# Patient Record
Sex: FEMALE | Race: WHITE | NOT HISPANIC OR LATINO | Employment: FULL TIME | URBAN - NONMETROPOLITAN AREA
[De-identification: names, ages, dates, MRNs, and addresses within clinical notes are randomized per-mention and may not be internally consistent; named-entity substitution may affect disease eponyms.]

---

## 2017-03-04 ENCOUNTER — HOSPITAL ENCOUNTER (EMERGENCY)
Facility: HOSPITAL | Age: 27
Discharge: HOME | End: 2017-03-04
Admitting: EMERGENCY MEDICINE

## 2017-03-04 VITALS
HEIGHT: 65 IN | SYSTOLIC BLOOD PRESSURE: 126 MMHG | BODY MASS INDEX: 44.15 KG/M2 | WEIGHT: 265 LBS | OXYGEN SATURATION: 99 % | HEART RATE: 75 BPM | TEMPERATURE: 98 F | DIASTOLIC BLOOD PRESSURE: 82 MMHG | RESPIRATION RATE: 17 BRPM

## 2017-03-04 DIAGNOSIS — Z87.42 HISTORY OF PCOS: ICD-10-CM

## 2017-03-04 DIAGNOSIS — N92.1 MENORRHAGIA WITH IRREGULAR CYCLE: Primary | ICD-10-CM

## 2017-03-04 LAB
ABO GROUP BLD: NORMAL
ALBUMIN SERPL-MCNC: 4 G/DL (ref 3.5–5)
ALBUMIN/GLOB SERPL: 1.3 G/DL (ref 1.1–2.5)
ALP SERPL-CCNC: 60 U/L (ref 24–120)
ALT SERPL W P-5'-P-CCNC: 27 U/L (ref 0–54)
ANION GAP SERPL CALCULATED.3IONS-SCNC: 10 MMOL/L (ref 4–13)
APTT PPP: 28.1 SECONDS (ref 24.1–34.8)
AST SERPL-CCNC: 25 U/L (ref 7–45)
BASOPHILS # BLD AUTO: 0.02 10*3/MM3 (ref 0–0.2)
BASOPHILS NFR BLD AUTO: 0.2 % (ref 0–2)
BILIRUB SERPL-MCNC: 0.3 MG/DL (ref 0.1–1)
BLD GP AB SCN SERPL QL: NEGATIVE
BUN BLD-MCNC: 9 MG/DL (ref 5–21)
BUN/CREAT SERPL: 10.2 (ref 7–25)
CALCIUM SPEC-SCNC: 9.4 MG/DL (ref 8.4–10.4)
CHLORIDE SERPL-SCNC: 102 MMOL/L (ref 98–110)
CO2 SERPL-SCNC: 28 MMOL/L (ref 24–31)
CREAT BLD-MCNC: 0.88 MG/DL (ref 0.5–1.4)
DEPRECATED RDW RBC AUTO: 39.4 FL (ref 40–54)
EOSINOPHIL # BLD AUTO: 0.18 10*3/MM3 (ref 0–0.7)
EOSINOPHIL NFR BLD AUTO: 1.9 % (ref 0–4)
ERYTHROCYTE [DISTWIDTH] IN BLOOD BY AUTOMATED COUNT: 13.4 % (ref 12–15)
GFR SERPL CREATININE-BSD FRML MDRD: 78 ML/MIN/1.73
GLOBULIN UR ELPH-MCNC: 3.1 GM/DL
GLUCOSE BLD-MCNC: 92 MG/DL (ref 70–100)
HCG SERPL QL: NEGATIVE
HCT VFR BLD AUTO: 38.8 % (ref 37–47)
HGB BLD-MCNC: 13.9 G/DL (ref 12–16)
IMM GRANULOCYTES # BLD: 0.03 10*3/MM3 (ref 0–0.03)
IMM GRANULOCYTES NFR BLD: 0.3 % (ref 0–5)
INR PPP: 0.9 (ref 0.91–1.09)
LYMPHOCYTES # BLD AUTO: 3.61 10*3/MM3 (ref 0.72–4.86)
LYMPHOCYTES NFR BLD AUTO: 38.3 % (ref 15–45)
MCH RBC QN AUTO: 29.1 PG (ref 28–32)
MCHC RBC AUTO-ENTMCNC: 35.8 G/DL (ref 33–36)
MCV RBC AUTO: 81.3 FL (ref 82–98)
MONOCYTES # BLD AUTO: 0.54 10*3/MM3 (ref 0.19–1.3)
MONOCYTES NFR BLD AUTO: 5.7 % (ref 4–12)
NEUTROPHILS # BLD AUTO: 5.04 10*3/MM3 (ref 1.87–8.4)
NEUTROPHILS NFR BLD AUTO: 53.6 % (ref 39–78)
PLATELET # BLD AUTO: 271 10*3/MM3 (ref 130–400)
PMV BLD AUTO: 9.9 FL (ref 6–12)
POTASSIUM BLD-SCNC: 3.7 MMOL/L (ref 3.5–5.3)
PROT SERPL-MCNC: 7.1 G/DL (ref 6.3–8.7)
PROTHROMBIN TIME: 12.4 SECONDS (ref 11.9–14.6)
RBC # BLD AUTO: 4.77 10*6/MM3 (ref 4.2–5.4)
RH BLD: POSITIVE
SODIUM BLD-SCNC: 140 MMOL/L (ref 135–145)
WBC NRBC COR # BLD: 9.42 10*3/MM3 (ref 4.8–10.8)

## 2017-03-04 PROCEDURE — 84703 CHORIONIC GONADOTROPIN ASSAY: CPT | Performed by: NURSE PRACTITIONER

## 2017-03-04 PROCEDURE — 85025 COMPLETE CBC W/AUTO DIFF WBC: CPT | Performed by: NURSE PRACTITIONER

## 2017-03-04 PROCEDURE — 99283 EMERGENCY DEPT VISIT LOW MDM: CPT

## 2017-03-04 PROCEDURE — 86900 BLOOD TYPING SEROLOGIC ABO: CPT | Performed by: NURSE PRACTITIONER

## 2017-03-04 PROCEDURE — 85730 THROMBOPLASTIN TIME PARTIAL: CPT | Performed by: NURSE PRACTITIONER

## 2017-03-04 PROCEDURE — 86850 RBC ANTIBODY SCREEN: CPT | Performed by: NURSE PRACTITIONER

## 2017-03-04 PROCEDURE — 96361 HYDRATE IV INFUSION ADD-ON: CPT

## 2017-03-04 PROCEDURE — 85610 PROTHROMBIN TIME: CPT | Performed by: NURSE PRACTITIONER

## 2017-03-04 PROCEDURE — 96360 HYDRATION IV INFUSION INIT: CPT

## 2017-03-04 PROCEDURE — 86901 BLOOD TYPING SEROLOGIC RH(D): CPT | Performed by: NURSE PRACTITIONER

## 2017-03-04 PROCEDURE — 80053 COMPREHEN METABOLIC PANEL: CPT | Performed by: NURSE PRACTITIONER

## 2017-03-04 RX ORDER — MEDROXYPROGESTERONE ACETATE 5 MG/1
5 TABLET ORAL DAILY
Qty: 5 TABLET | Refills: 0 | Status: SHIPPED | OUTPATIENT
Start: 2017-03-04 | End: 2017-03-06 | Stop reason: ALTCHOICE

## 2017-03-04 RX ORDER — HYDROCODONE BITARTRATE AND ACETAMINOPHEN 5; 325 MG/1; MG/1
1 TABLET ORAL EVERY 6 HOURS PRN
Qty: 10 TABLET | Refills: 0 | Status: SHIPPED | OUTPATIENT
Start: 2017-03-04 | End: 2017-03-20

## 2017-03-04 RX ADMIN — SODIUM CHLORIDE 500 ML: 0.9 INJECTION, SOLUTION INTRAVENOUS at 19:16

## 2017-03-05 NOTE — ED PROVIDER NOTES
Subjective   Patient is a 26 y.o. female presenting with vaginal bleeding.   Vaginal Bleeding   Quality:  Bright red  Severity:  Moderate  Onset quality:  Gradual  Duration:  2 weeks  Timing:  Intermittent  Progression:  Worsening  Chronicity:  New  Menstrual history:  Irregular (hx of PCOS- reports heavy menstural bleeding this month)  Possible pregnancy: no    Context: at rest    Context: not after intercourse, not after urination, not during intercourse, not during urination, not foreign body and not genital trauma    Relieved by:  Nothing  Worsened by:  Nothing  Ineffective treatments:  None tried  Associated symptoms: abdominal pain    Associated symptoms: no back pain, no dizziness, no dyspareunia, no dysuria, no fatigue, no fever and no vaginal discharge    Associated symptoms comment:  Reports typical abdominal cramping associated with patient's menstrual cycle  Risk factors: no bleeding disorder, no hx of ectopic pregnancy, no hx of endometriosis, no gynecological surgery, no new sexual partner, no ovarian cysts, no ovarian torsion, no PID, no STD and no STD exposure        Review of Systems   Constitutional: Negative for appetite change, chills, fatigue and fever.   HENT: Negative for congestion and sore throat.    Respiratory: Negative for chest tightness and shortness of breath.    Cardiovascular: Negative for palpitations.   Gastrointestinal: Positive for abdominal pain. Negative for abdominal distention and vomiting.   Genitourinary: Positive for menstrual problem and vaginal bleeding. Negative for difficulty urinating, dyspareunia, dysuria and vaginal discharge.   Musculoskeletal: Negative for back pain, joint swelling, neck pain and neck stiffness.   Skin: Negative for rash.   Neurological: Negative for dizziness and headaches.   All other systems reviewed and are negative.      Past Medical History   Diagnosis Date   • Contraception    • Dyspareunia    • Hypertension    • Insulin resistance    • IUD  check up    • Ovarian cyst    • PCOS (polycystic ovarian syndrome)    • Trigeminal neuralgia    • Vitamin D deficiency        Allergies   Allergen Reactions   • Ibuprofen        Past Surgical History   Procedure Laterality Date   • Tonsillectomy and adenoidectomy     • [place] tooth extraction         Family History   Problem Relation Age of Onset   • No Known Problems Mother    • No Known Problems Father    • No Known Problems Maternal Grandmother    • No Known Problems Maternal Grandfather    • Leukemia Paternal Grandmother    • No Known Problems Paternal Grandfather        Social History     Social History   • Marital status:      Spouse name: N/A   • Number of children: N/A   • Years of education: N/A     Social History Main Topics   • Smoking status: Never Smoker   • Smokeless tobacco: None   • Alcohol use Yes      Comment: occasional   • Drug use: No   • Sexual activity: Yes     Partners: Male     Birth control/ protection: None     Other Topics Concern   • None     Social History Narrative   • None       Prior to Admission medications    Medication Sig Start Date End Date Taking? Authorizing Provider   amitriptyline (ELAVIL) 25 MG tablet Take 25 mg by mouth. 9/26/16  Yes Historical Provider, MD   gabapentin (NEURONTIN) 600 MG tablet Take 600 mg by mouth 3 (three) times a day. 9/26/16  Yes Historical Provider, MD   metFORMIN (GLUCOPHAGE) 500 MG tablet Take 1 tablet by mouth 2 (Two) Times a Day With Meals. 11/23/16 11/23/17 Yes ZITA Her   OXcarbazepine (TRILEPTAL) 300 MG tablet Take 300 mg by mouth.   Yes Historical Provider, MD   HYDROcodone-acetaminophen (NORCO) 5-325 MG per tablet Take 1 tablet by mouth Every 6 (Six) Hours As Needed for moderate pain (4-6). 3/4/17   ZITA Gudino   medroxyPROGESTERone (PROVERA) 5 MG tablet Take 1 tablet by mouth Daily for 5 days. 3/4/17 3/9/17  ZITA Gudino       Medications   sodium chloride 0.9 % bolus 500 mL (0 mL Intravenous  "Stopped 3/4/17 2111)       Visit Vitals   • /82 (BP Location: Right arm, Patient Position: Sitting)   • Pulse 75   • Temp 98 °F (36.7 °C) (Oral)   • Resp 17   • Ht 65\" (165.1 cm)   • Wt 265 lb (120 kg)   • SpO2 99%   • BMI 44.1 kg/m2         Objective   Physical Exam   Constitutional: She is oriented to person, place, and time. She appears well-developed and well-nourished.   HENT:   Head: Normocephalic and atraumatic.   Right Ear: External ear normal.   Left Ear: External ear normal.   Nose: Nose normal.   Mouth/Throat: Oropharynx is clear and moist.   Eyes: Conjunctivae and EOM are normal. Pupils are equal, round, and reactive to light.   Neck: Normal range of motion. Neck supple.   Cardiovascular: Normal rate, regular rhythm, normal heart sounds and intact distal pulses.    Pulmonary/Chest: Effort normal and breath sounds normal.   Abdominal: Soft. Bowel sounds are normal.   Musculoskeletal: Normal range of motion.   Neurological: She is alert and oriented to person, place, and time.   Skin: Skin is warm and dry.   Psychiatric: She has a normal mood and affect. Her behavior is normal. Judgment and thought content normal.   Nursing note and vitals reviewed.      Procedures         Lab Results (last 24 hours)     Procedure Component Value Units Date/Time    CBC & Differential [[id number]] Collected:  03/04/17 1905    Specimen:  Blood Updated:  03/04/17 1939    Narrative:       The following orders were created for panel order CBC & Differential.  Procedure                               Abnormality         Status                     ---------                               -----------         ------                     CBC Auto Differential[[id number]]         Abnormal            Final result                 Please view results for these tests on the individual orders.    Comprehensive Metabolic Panel [[id number]] Collected:  03/04/17 1905    Specimen:  Blood Updated:  03/04/17 1950     Glucose 92 mg/dL      BUN 9 " mg/dL      Creatinine 0.88 mg/dL      Sodium 140 mmol/L      Potassium 3.7 mmol/L      Chloride 102 mmol/L      CO2 28.0 mmol/L      Calcium 9.4 mg/dL      Total Protein 7.1 g/dL      Albumin 4.00 g/dL      ALT (SGPT) 27 U/L      AST (SGOT) 25 U/L      Alkaline Phosphatase 60 U/L      Total Bilirubin 0.3 mg/dL      eGFR Non African Amer 78 mL/min/1.73      Globulin 3.1 gm/dL      A/G Ratio 1.3 g/dL      BUN/Creatinine Ratio 10.2      Anion Gap 10.0 mmol/L     aPTT [[id number]]  (Normal) Collected:  03/04/17 1905    Specimen:  Blood Updated:  03/04/17 1949     PTT 28.1 seconds     Protime-INR [[id number]]  (Abnormal) Collected:  03/04/17 1905    Specimen:  Blood Updated:  03/04/17 1949     Protime 12.4 Seconds      INR 0.90 (L)     hCG, Serum, Qualitative [[id number]]  (Normal) Collected:  03/04/17 1905    Specimen:  Blood Updated:  03/04/17 2009     HCG Qualitative Negative     CBC Auto Differential [[id number]]  (Abnormal) Collected:  03/04/17 1905    Specimen:  Blood Updated:  03/04/17 1939     WBC 9.42 10*3/mm3      RBC 4.77 10*6/mm3      Hemoglobin 13.9 g/dL      Hematocrit 38.8 %      MCV 81.3 (L) fL      MCH 29.1 pg      MCHC 35.8 g/dL      RDW 13.4 %      RDW-SD 39.4 (L) fl      MPV 9.9 fL      Platelets 271 10*3/mm3      Neutrophil % 53.6 %      Lymphocyte % 38.3 %      Monocyte % 5.7 %      Eosinophil % 1.9 %      Basophil % 0.2 %      Immature Grans % 0.3 %      Neutrophils, Absolute 5.04 10*3/mm3      Lymphocytes, Absolute 3.61 10*3/mm3      Monocytes, Absolute 0.54 10*3/mm3      Eosinophils, Absolute 0.18 10*3/mm3      Basophils, Absolute 0.02 10*3/mm3      Immature Grans, Absolute 0.03 10*3/mm3           No orders to display         ED Course  ED Course          MDM  Number of Diagnoses or Management Options  History of PCOS: new and requires workup  Menorrhagia with irregular cycle: new and requires workup     Amount and/or Complexity of Data Reviewed  Clinical lab tests: ordered and reviewed  Discuss the  patient with other providers: yes    Risk of Complications, Morbidity, and/or Mortality  Presenting problems: low  Diagnostic procedures: low  Management options: low    Patient Progress  Patient progress: improved      Final diagnoses:   Menorrhagia with irregular cycle   History of PCOS          Rosemarie Tesfaye, ZITA  03/04/17 6850

## 2017-03-06 RX ORDER — MEDROXYPROGESTERONE ACETATE 10 MG/1
10 TABLET ORAL DAILY
Qty: 10 TABLET | Refills: 1 | Status: SHIPPED | OUTPATIENT
Start: 2017-03-06 | End: 2017-05-08

## 2017-03-06 NOTE — PROGRESS NOTES
Jaquelin was in the ER over weekend as she has had bleeding for greater than ten days and it became quite heavy with clots.  She has PCOS and is on Metformin.  Her  was in November.  Her PG. Test was negative  And her HGB was 13.9. She states she was given Provera 5 mgm 1 daily for 5 days.  I spoke with her today and she has not filled it, she is at work  And her bleeding has  Cut down significantly.  She has an appointment on the 28th of March.  I will increase the Provera to 10 mgm  1 daily for 10 days. She will call in the meantime if her bleeding becomes heavy again or prolonged.

## 2017-03-16 ENCOUNTER — TELEPHONE (OUTPATIENT)
Dept: OBSTETRICS AND GYNECOLOGY | Facility: CLINIC | Age: 27
End: 2017-03-16

## 2017-03-16 NOTE — TELEPHONE ENCOUNTER
Patient calls with questions regarding her provera.  Would like for you to call her at her convenience.

## 2017-03-19 ENCOUNTER — DOCUMENTATION (OUTPATIENT)
Dept: OBSTETRICS AND GYNECOLOGY | Facility: CLINIC | Age: 27
End: 2017-03-19

## 2017-03-19 PROBLEM — N93.8 DUB (DYSFUNCTIONAL UTERINE BLEEDING): Status: ACTIVE | Noted: 2017-03-19

## 2017-03-19 NOTE — PROGRESS NOTES
Jaquelin has been using Provera to stop DUB.  She called last week and left a message that she had a question about Provera.  I viewed the message and given the limited information being  that of a medication question, I continued seeing patient's  with plans of calling her before the day was over.  I was not aware that she was having bleeding issues even while on the Provera.  I  inadvertently failed to call her back re: her question about Provera.  During my communication with Jaquelin today, she tells me that  the Provera did not stop her vaginal bleeding.  She previously had been told, just prior to starting the Provera that she would have a followup appointment. Her followup appointment is 3/28/2107  I communicated with Jaquelin today and I will see her in the am,there is no extremely  heavy bleeding now but she continues to have vaginal bleeding.  I apologized for my oversight.  Jaquelin seemed happy with this and said we were the best.

## 2017-03-20 ENCOUNTER — OFFICE VISIT (OUTPATIENT)
Dept: OBSTETRICS AND GYNECOLOGY | Facility: CLINIC | Age: 27
End: 2017-03-20

## 2017-03-20 ENCOUNTER — PROCEDURE VISIT (OUTPATIENT)
Dept: OBSTETRICS AND GYNECOLOGY | Facility: CLINIC | Age: 27
End: 2017-03-20

## 2017-03-20 VITALS
SYSTOLIC BLOOD PRESSURE: 140 MMHG | BODY MASS INDEX: 43.82 KG/M2 | DIASTOLIC BLOOD PRESSURE: 85 MMHG | HEIGHT: 65 IN | WEIGHT: 263 LBS

## 2017-03-20 DIAGNOSIS — E88.81 INSULIN RESISTANCE: ICD-10-CM

## 2017-03-20 DIAGNOSIS — N92.1 MENORRHAGIA WITH IRREGULAR CYCLE: Primary | ICD-10-CM

## 2017-03-20 DIAGNOSIS — E28.2 PCOS (POLYCYSTIC OVARIAN SYNDROME): ICD-10-CM

## 2017-03-20 DIAGNOSIS — R79.89 ELEVATED PROLACTIN LEVEL: ICD-10-CM

## 2017-03-20 LAB
B-HCG UR QL: NEGATIVE
INTERNAL NEGATIVE CONTROL: NORMAL
INTERNAL POSITIVE CONTROL: NORMAL
Lab: NORMAL

## 2017-03-20 PROCEDURE — 76830 TRANSVAGINAL US NON-OB: CPT | Performed by: OBSTETRICS & GYNECOLOGY

## 2017-03-20 PROCEDURE — 99213 OFFICE O/P EST LOW 20 MIN: CPT | Performed by: NURSE PRACTITIONER

## 2017-03-20 PROCEDURE — 81025 URINE PREGNANCY TEST: CPT | Performed by: NURSE PRACTITIONER

## 2017-03-20 RX ORDER — HYDROCODONE BITARTRATE AND ACETAMINOPHEN 10; 325 MG/1; MG/1
TABLET ORAL
Refills: 0 | COMMUNITY
Start: 2016-12-20 | End: 2017-05-08

## 2017-03-20 NOTE — PROGRESS NOTES
Subjective     Jaquelin Rivera is a 26 y.o. No obstetric history on file.     Chief Complaint   Patient presents with   • Menorrhagia     Pt is here today for c/o having heavy bleeding that has been going on for about a month. Pt states that she went to ER for this and is now here.  Pt states that its heavy bleeding with passing alot clots as well as coming close to passing out            HPI  Patient has been off BCP since Oct. She was given  Provera to have a period in November, she had a follow up period 11/26 to Dec. 2. The she start bleeding on Feb. 16th, she continued to bleed and I gave her Provera 10 days and she did not quit bleeding and is still bleeding, the bleeding is heavy, the Provera made no difference  and she  has  been off of the Provera.  X 4 days. She has mild cramping.      The following portions of the patient's history were reviewed and updated as appropriate:vital signs, allergies, current medications, past family history, past medical history, past social history, past surgical history and problem list.    Review of Systems   Constitutional: Negative for activity change and diaphoresis.   HENT: Negative for congestion, ear discharge and nosebleeds.    Eyes: Negative for discharge and itching.   Respiratory: Negative for apnea and choking.    Gastrointestinal: Negative for abdominal distention and anal bleeding.   Endocrine: Negative for cold intolerance and polydipsia.   Genitourinary: Negative for dyspareunia, flank pain, hematuria and urgency.   Musculoskeletal: Negative for arthralgias, gait problem and myalgias.   Skin:        Hirsutism    chin   Neurological: Negative for tremors and headaches. Seizures: menorrhagia.        Trigeminal  neuralgia   Psychiatric/Behavioral: Negative for agitation, confusion and dysphoric mood. The patient is not hyperactive.                   Visit Vitals   • BP    #2 BP  124/76  Pulse  64  Resp. normal 140/85 (BP Location: Right arm, Patient  "Position: Sitting, Cuff Size: Adult)   • Ht 65\" (165.1 cm)   • Wt 263 lb (119 kg)   • Breastfeeding No   • BMI 43.77 kg/m2         Physical Exam   Constitutional: She appears well-developed.   27 yo obese female in no acute distress.  Accompanied by her .   Abdominal: Soft. Normal appearance, normal aorta and bowel sounds are normal.   Genitourinary: Rectal exam shows no mass. Pelvic exam was performed with patient supine. There is no rash or tenderness on the right labia. There is no tenderness on the left labia. Uterus is not deviated, not enlarged, not fixed and not tender. Cervix exhibits discharge (vaginal bleeding noted, active  bleeding noted off and on over 2 minutes of observation, the bleeding was not excessive). Cervix exhibits no motion tenderness and no friability. Right adnexum displays no mass and no tenderness. Left adnexum displays no mass and no tenderness. No erythema, tenderness or bleeding in the vagina. No foreign body in the vagina. No signs of injury around the vagina. No vaginal discharge found.   Vitals reviewed.       Assessment/Plan     ASSESSMENT  1. Menorrhagia with irregular cycle        PLAN    Problem List Items Addressed This Visit     None      Visit Diagnoses     Menorrhagia with irregular cycle    -  Primary    Relevant Orders    CBC Auto Differential    Prolactin    POC Pregnancy, Urine  negative                PCOS (polycystic ovarian syndrome)    Insulin resistance    Elevated prolactin level  Minimally she has no galactorrhea    US shows  9 mm endometrium with developing follicle on each with multiple  Small follicles  Noted bilaterally  There is a clot vs polyp near the cervix, likely is a clot she is getting ready to pass.    Patient is to start Aygestin Taper, counseled to report if there is increase in bleeding or it does not subside. She is counseled that she will had a withdrawal period following the Aygestin and then we will repeat her US.  "                   Jeannie Latif, APRN  3/20/2017

## 2017-03-22 LAB
BASOPHILS # BLD AUTO: 0.02 10*3/MM3 (ref 0–0.2)
BASOPHILS NFR BLD AUTO: 0.2 % (ref 0–2)
EOSINOPHIL # BLD AUTO: 0.18 10*3/MM3 (ref 0–0.7)
EOSINOPHIL NFR BLD AUTO: 2.1 % (ref 0–4)
ERYTHROCYTE [DISTWIDTH] IN BLOOD BY AUTOMATED COUNT: 14.3 % (ref 12–15)
HCT VFR BLD AUTO: 38.3 % (ref 37–47)
HGB BLD-MCNC: 12.6 G/DL (ref 12–16)
IMM GRANULOCYTES # BLD: 0.04 10*3/MM3 (ref 0–0.03)
IMM GRANULOCYTES NFR BLD: 0.5 % (ref 0–5)
INSULIN SERPL-ACNC: 39.6 UIU/ML (ref 2.6–24.9)
LYMPHOCYTES # BLD AUTO: 2.78 10*3/MM3 (ref 0.72–4.86)
LYMPHOCYTES NFR BLD AUTO: 32.2 % (ref 15–45)
MCH RBC QN AUTO: 28.1 PG (ref 28–32)
MCHC RBC AUTO-ENTMCNC: 32.9 G/DL (ref 33–36)
MCV RBC AUTO: 85.5 FL (ref 82–98)
MONOCYTES # BLD AUTO: 0.59 10*3/MM3 (ref 0.19–1.3)
MONOCYTES NFR BLD AUTO: 6.8 % (ref 4–12)
NEUTROPHILS # BLD AUTO: 5.03 10*3/MM3 (ref 1.87–8.4)
NEUTROPHILS NFR BLD AUTO: 58.2 % (ref 39–78)
PLATELET # BLD AUTO: 307 10*3/MM3 (ref 130–400)
PROLACTIN SERPL-MCNC: 31.1 NG/ML (ref 4.8–23.3)
RBC # BLD AUTO: 4.48 10*6/MM3 (ref 4.2–5.4)
WBC # BLD AUTO: 8.64 10*3/MM3 (ref 4.8–10.8)

## 2017-04-03 ENCOUNTER — PROCEDURE VISIT (OUTPATIENT)
Dept: OBSTETRICS AND GYNECOLOGY | Facility: CLINIC | Age: 27
End: 2017-04-03

## 2017-04-03 ENCOUNTER — OFFICE VISIT (OUTPATIENT)
Dept: OBSTETRICS AND GYNECOLOGY | Facility: CLINIC | Age: 27
End: 2017-04-03

## 2017-04-03 VITALS
BODY MASS INDEX: 43.82 KG/M2 | WEIGHT: 263 LBS | DIASTOLIC BLOOD PRESSURE: 80 MMHG | SYSTOLIC BLOOD PRESSURE: 140 MMHG | HEIGHT: 65 IN

## 2017-04-03 DIAGNOSIS — E28.2 PCOS (POLYCYSTIC OVARIAN SYNDROME): Primary | ICD-10-CM

## 2017-04-03 DIAGNOSIS — N92.1 MENORRHAGIA WITH IRREGULAR CYCLE: Primary | ICD-10-CM

## 2017-04-03 LAB
CHOLEST SERPL-MCNC: 191 MG/DL (ref 130–200)
HBA1C MFR BLD: 4.1 %
HDLC SERPL-MCNC: 46 MG/DL
LDLC SERPL CALC-MCNC: 130 MG/DL (ref 0–99)
LDLC/HDLC SERPL: 2.82 {RATIO}
TRIGL SERPL-MCNC: 76 MG/DL (ref 0–149)
VLDLC SERPL CALC-MCNC: 15.2 MG/DL

## 2017-04-03 PROCEDURE — 99213 OFFICE O/P EST LOW 20 MIN: CPT | Performed by: NURSE PRACTITIONER

## 2017-04-03 PROCEDURE — 76830 TRANSVAGINAL US NON-OB: CPT | Performed by: OBSTETRICS & GYNECOLOGY

## 2017-04-03 RX ORDER — LEVOCETIRIZINE DIHYDROCHLORIDE 5 MG/1
TABLET, FILM COATED ORAL
Refills: 0 | COMMUNITY
Start: 2017-02-12 | End: 2017-05-08

## 2017-04-04 NOTE — PROGRESS NOTES
Jaquelin Rivera is a 26 y.o. No obstetric history on file.     Chief Complaint   Patient presents with   • Vaginal Bleeding     Pt is here today for f/u on results of U/S she had done in the office.  Pt states that she stopped  bleeding for a few days but has started again last night with some bleeding           HPI -  Jaquelin is in today for followup for abnormal bleeding for wich she did not stop with Provera but she did Aygestin.  She stopped Aygestin 4 perry ago and is now spotting.  She has PCOS . She has regular menses only when on  BCP and is off of them and not using any contraception.  She is taking Metformin (regular release) 500 BID and she has some  nausea with this. She has been on this  her insurance would not cover the long acting.        The following portions of the patient's history were reviewed and updated as appropriate:vital signs, allergies, current medications, past family history, past medical history, past social history, past surgical history and problem list.    Review of Systems   Constitutional: Negative for activity change, chills and diaphoresis.   HENT: Negative for congestion, dental problem, ear pain, nosebleeds, postnasal drip and sneezing.    Eyes: Negative for pain, redness and itching.   Respiratory: Negative for apnea, cough and chest tightness.    Cardiovascular: Negative for chest pain and leg swelling.   Gastrointestinal: Negative for abdominal distention, anal bleeding and constipation.   Endocrine: Negative for cold intolerance, heat intolerance and polyphagia.   Genitourinary: Negative for difficulty urinating, dysuria, flank pain and urgency.   Musculoskeletal: Positive for myalgias. Negative for arthralgias, gait problem and joint swelling.   Skin: Negative for pallor and rash.   Allergic/Immunologic: Negative for environmental allergies and food allergies.   Neurological: Negative for seizures, syncope and headaches.        PATIENT HAS TRIGIMINAL  NEURALGIA  "    Breast ROS: PATIENT HAS NO GALACTORRHEA    Objective      /80 (BP Location: Right arm, Patient Position: Sitting, Cuff Size: Adult)  Ht 65\" (165.1 cm)  Wt 263 lb (119 kg)  BMI 43.77 kg/m2      Physical Exam   Constitutional: She is oriented to person, place, and time. She appears well-developed and well-nourished. No distress.   HENT:   Head: Normocephalic and atraumatic.   Eyes: Right eye exhibits no discharge. Left eye exhibits no discharge.   Neck: Normal range of motion. Neck supple. No thyromegaly present.   Pulmonary/Chest: Effort normal.   Abdominal: Soft. She exhibits no distension. There is no tenderness.   Musculoskeletal: She exhibits no edema or deformity.   Neurological: She is alert and oriented to person, place, and time.   Patient has trigeminal neuralgia  She no longer is taking Trileptal or Gabapentin   Skin: She is not diaphoretic.   Nursing note and vitals reviewed.       Assessment/Plan     ASSESSMENT  1. PCOS (polycystic ovarian syndrome)  US today again c/w PCOS, LH 3x FSH  Elevated Insulin on Metformin 500 BID  (has nausea) insurance would not pay for long acting  HGB  A!C  Normal today   LDL elevated, HDL's low, cholesterol less than 200.  History of minimal elevated Prolactin  Most recent 31 3/21/17  History of AUB, prolonged heavy menses stopped by Aygestin     PATIENT IS NOT USING BIRTH CONTROL. SHE WOULD LIKE TO GET PREGNANT  PLAN    Problem List Items Addressed This Visit     None      Visit Diagnoses     PCOS (polycystic ovarian syndrome)    -  Primary    Relevant Orders    Hemoglobin A1c (Completed)    Lipid Panel With LDL / HDL Ratio (Completed)          Orders Placed This Encounter   Procedures   • Hemoglobin A1c   • Lipid Panel With LDL / HDL Ratio       I will review POC with physician and advise the patient.    Jeannie Latif, APRN  4/4/2017           "

## 2017-04-17 ENCOUNTER — DOCUMENTATION (OUTPATIENT)
Dept: OBSTETRICS AND GYNECOLOGY | Facility: CLINIC | Age: 27
End: 2017-04-17

## 2017-04-17 NOTE — PROGRESS NOTES
Patient is not tolerating the Metformin 500 bid, she has GI  upset even though she has taken it for months.  Five months ago her Insulin level on the Metformin 500 bid was 19.9.  Her recent Insulin level is elevated at 39.6 while on the same dose.  Jaquelin has had a mildly elevated prolactin level on 3 occassions as can be associated with  PCOS.  She has no galactorrhea.  Jaquelin has not been taking Trileptal for several months.  She previously took this for trigeminal neuralgia.    Within the past 4 weeks, Jaquelin had very heavy and prolonged heavy bleeding that did not respond to Provera but did with Aygestin.  Her US after Aygestin withdrawal shows ovaries C/W PCOS.   She and her  want to proceed with attempting pregnancy and understands that she will need   Clomid.    I will send in a P.A. For Metformin  2 po daily with food. (Felicia).  She has a followup appointment with Dr. Osorio.

## 2017-04-24 ENCOUNTER — OFFICE VISIT (OUTPATIENT)
Dept: OBSTETRICS AND GYNECOLOGY | Facility: CLINIC | Age: 27
End: 2017-04-24

## 2017-04-24 VITALS
SYSTOLIC BLOOD PRESSURE: 138 MMHG | WEIGHT: 268 LBS | DIASTOLIC BLOOD PRESSURE: 88 MMHG | BODY MASS INDEX: 44.65 KG/M2 | HEIGHT: 65 IN

## 2017-04-24 DIAGNOSIS — N97.0 ANOVULATORY CYCLE: Primary | ICD-10-CM

## 2017-04-24 DIAGNOSIS — Z78.9 NON-SMOKER: ICD-10-CM

## 2017-04-24 DIAGNOSIS — Z31.69 ENCOUNTER FOR PRECONCEPTION CONSULTATION: ICD-10-CM

## 2017-04-24 PROBLEM — N93.8 DUB (DYSFUNCTIONAL UTERINE BLEEDING): Status: RESOLVED | Noted: 2017-03-19 | Resolved: 2017-04-24

## 2017-04-24 PROCEDURE — 99213 OFFICE O/P EST LOW 20 MIN: CPT | Performed by: OBSTETRICS & GYNECOLOGY

## 2017-04-24 NOTE — PROGRESS NOTES
Subjective   Jaquelin Rivera is a 26 y.o. female is here today for follow-up.  Here to discuss fertility.  Menarche 13.  Long hx chronic anovulation and heavy when it occurred.  Took OCPs for several years with correction (started at age of 14).  Off OCPs for 9-12 months.  Now with typical chronic anovulatory cycles.  Trying to conceive since Dec 2016.  Partner with no kids.  On no meds other than weekly allergy shot.  Hx of malignancy with lower back radiation (partner).  Patient started on metformin last year.    Infertility   This is a chronic problem. The current episode started more than 1 month ago. The problem occurs intermittently. The problem has been unchanged. Pertinent negatives include no abdominal pain, anorexia, arthralgias, change in bowel habit, chest pain, chills, congestion, coughing, diaphoresis, fatigue, fever, headaches, joint swelling, myalgias, nausea, neck pain, numbness, rash, sore throat, swollen glands, urinary symptoms, vertigo, visual change, vomiting or weakness. Nothing aggravates the symptoms. She has tried nothing for the symptoms. The treatment provided no relief.       The following portions of the patient's history were reviewed and updated as appropriate: allergies, current medications, past family history, past medical history, past social history, past surgical history and problem list.    Review of Systems   Constitutional: Negative for chills, diaphoresis, fatigue and fever.   HENT: Negative for congestion and sore throat.    Eyes: Negative for photophobia and visual disturbance.   Respiratory: Negative for cough, choking and chest tightness.    Cardiovascular: Negative for chest pain and leg swelling.   Gastrointestinal: Negative for abdominal pain, anorexia, change in bowel habit, nausea and vomiting.   Endocrine: Negative for cold intolerance and heat intolerance.   Genitourinary: Positive for menstrual problem (irreg and heavy). Negative for dyspareunia, pelvic pain,  vaginal bleeding and vaginal discharge.   Musculoskeletal: Negative for arthralgias, joint swelling, myalgias and neck pain.   Skin: Negative for rash.   Neurological: Negative for vertigo, weakness, numbness and headaches.   Psychiatric/Behavioral: Negative for confusion, decreased concentration and dysphoric mood.       Objective   Physical Exam   Constitutional: She is oriented to person, place, and time. She appears well-developed and well-nourished.   HENT:   Head: Normocephalic and atraumatic.   Neck: Normal range of motion. Neck supple. No tracheal deviation present. No thyromegaly present.   Cardiovascular: Normal rate and regular rhythm.    Pulmonary/Chest: Effort normal and breath sounds normal.   Abdominal: Soft. Bowel sounds are normal. She exhibits no distension. There is no tenderness.   Musculoskeletal: Normal range of motion.   Neurological: She is alert and oriented to person, place, and time.   Skin: Skin is warm and dry. No rash noted. No erythema. No pallor.   Psychiatric: She has a normal mood and affect. Her behavior is normal. Judgment and thought content normal.   Nursing note and vitals reviewed.        Assessment/Plan   Jaquelin was seen today for infertility.    Diagnoses and all orders for this visit:    Anovulatory cycle    Encounter for preconception consultation    Non-smoker      Needs SA before final recommendations.  Labs reviewed and discussed with patient.  Will follow up after SA performed.    Soham Osorio MD

## 2017-05-08 ENCOUNTER — OFFICE VISIT (OUTPATIENT)
Dept: OBSTETRICS AND GYNECOLOGY | Facility: CLINIC | Age: 27
End: 2017-05-08

## 2017-05-08 VITALS
HEIGHT: 65 IN | WEIGHT: 267 LBS | DIASTOLIC BLOOD PRESSURE: 100 MMHG | BODY MASS INDEX: 44.48 KG/M2 | SYSTOLIC BLOOD PRESSURE: 160 MMHG

## 2017-05-08 DIAGNOSIS — E28.2 PCOS (POLYCYSTIC OVARIAN SYNDROME): Primary | ICD-10-CM

## 2017-05-08 DIAGNOSIS — Z31.69 ENCOUNTER FOR PRECONCEPTION CONSULTATION: ICD-10-CM

## 2017-05-08 DIAGNOSIS — Z78.9 NON-SMOKER: ICD-10-CM

## 2017-05-08 PROCEDURE — 99214 OFFICE O/P EST MOD 30 MIN: CPT | Performed by: OBSTETRICS & GYNECOLOGY

## 2017-05-08 RX ORDER — MEDROXYPROGESTERONE ACETATE 10 MG/1
10 TABLET ORAL DAILY
Qty: 30 TABLET | Refills: 1 | Status: SHIPPED | OUTPATIENT
Start: 2017-05-08 | End: 2017-05-31

## 2017-05-09 DIAGNOSIS — O20.0 THREATENED ABORTION: Primary | ICD-10-CM

## 2017-05-09 DIAGNOSIS — N91.2 AMENORRHEA: Primary | ICD-10-CM

## 2017-05-09 LAB — HCG INTACT+B SERPL-ACNC: ABNORMAL MIU/ML (ref 0–10)

## 2017-05-11 ENCOUNTER — RESULTS ENCOUNTER (OUTPATIENT)
Dept: OBSTETRICS AND GYNECOLOGY | Facility: CLINIC | Age: 27
End: 2017-05-11

## 2017-05-11 DIAGNOSIS — N91.2 AMENORRHEA: ICD-10-CM

## 2017-05-11 LAB — HCG INTACT+B SERPL-ACNC: ABNORMAL MIU/ML (ref 0–10)

## 2017-05-31 ENCOUNTER — PROCEDURE VISIT (OUTPATIENT)
Dept: OBSTETRICS AND GYNECOLOGY | Facility: CLINIC | Age: 27
End: 2017-05-31

## 2017-05-31 ENCOUNTER — INITIAL PRENATAL (OUTPATIENT)
Dept: OBSTETRICS AND GYNECOLOGY | Facility: CLINIC | Age: 27
End: 2017-05-31

## 2017-05-31 VITALS — DIASTOLIC BLOOD PRESSURE: 80 MMHG | BODY MASS INDEX: 43.93 KG/M2 | WEIGHT: 264 LBS | SYSTOLIC BLOOD PRESSURE: 120 MMHG

## 2017-05-31 DIAGNOSIS — Z34.91 PRENATAL CARE, FIRST TRIMESTER: Primary | ICD-10-CM

## 2017-05-31 DIAGNOSIS — Z78.9 NON-SMOKER: ICD-10-CM

## 2017-05-31 DIAGNOSIS — O36.80X0 ENCOUNTER TO DETERMINE FETAL VIABILITY OF PREGNANCY, NOT APPLICABLE OR UNSPECIFIED FETUS: Primary | ICD-10-CM

## 2017-05-31 LAB
EXTERNAL ABO GROUPING: NORMAL
EXTERNAL ANTIBODY SCREEN: NEGATIVE
EXTERNAL CHLAMYDIA SCREEN: NEGATIVE
EXTERNAL GONORRHEA SCREEN: NEGATIVE
EXTERNAL HEPATITIS B SURFACE ANTIGEN: NEGATIVE
EXTERNAL RH FACTOR: POSITIVE
EXTERNAL RUBELLA QUALITATIVE: NORMAL
EXTERNAL SYPHILIS RPR SCREEN: NORMAL
HIV1 P24 AG SERPL QL IA: NEGATIVE

## 2017-05-31 PROCEDURE — 76801 OB US < 14 WKS SINGLE FETUS: CPT | Performed by: OBSTETRICS & GYNECOLOGY

## 2017-05-31 PROCEDURE — 0501F PRENATAL FLOW SHEET: CPT | Performed by: OBSTETRICS & GYNECOLOGY

## 2017-06-02 LAB
ABO GROUP BLD: NORMAL
AMPHETAMINES UR QL SCN: NEGATIVE NG/ML
BACTERIA UR CULT: NO GROWTH
BACTERIA UR CULT: NORMAL
BARBITURATES UR QL SCN: NEGATIVE NG/ML
BASOPHILS # BLD AUTO: 0.02 10*3/MM3 (ref 0–0.2)
BASOPHILS NFR BLD AUTO: 0.3 % (ref 0–2)
BENZODIAZ UR QL: NEGATIVE NG/ML
BLD GP AB SCN SERPL QL: NEGATIVE
BZE UR QL: NEGATIVE NG/ML
C TRACH RRNA SPEC QL NAA+PROBE: NEGATIVE
CANNABINOIDS UR QL SCN: NEGATIVE NG/ML
EOSINOPHIL # BLD AUTO: 0.07 10*3/MM3 (ref 0–0.7)
EOSINOPHIL NFR BLD AUTO: 0.9 % (ref 0–4)
ERYTHROCYTE [DISTWIDTH] IN BLOOD BY AUTOMATED COUNT: 14 % (ref 12–15)
HBV SURFACE AG SERPL QL IA: NEGATIVE
HCT VFR BLD AUTO: 41.1 % (ref 37–47)
HGB BLD-MCNC: 14.1 G/DL (ref 12–16)
HIV 1+2 AB+HIV1 P24 AG SERPL QL IA: NON REACTIVE
IMM GRANULOCYTES # BLD: 0.02 10*3/MM3 (ref 0–0.03)
IMM GRANULOCYTES NFR BLD: 0.3 % (ref 0–5)
LYMPHOCYTES # BLD AUTO: 2.08 10*3/MM3 (ref 0.72–4.86)
LYMPHOCYTES NFR BLD AUTO: 26.2 % (ref 15–45)
MCH RBC QN AUTO: 26.5 PG (ref 28–32)
MCHC RBC AUTO-ENTMCNC: 34.3 G/DL (ref 33–36)
MCV RBC AUTO: 77.3 FL (ref 82–98)
METHADONE UR QL SCN: NEGATIVE NG/ML
MONOCYTES # BLD AUTO: 0.46 10*3/MM3 (ref 0.19–1.3)
MONOCYTES NFR BLD AUTO: 5.8 % (ref 4–12)
N GONORRHOEA RRNA SPEC QL NAA+PROBE: NEGATIVE
NEUTROPHILS # BLD AUTO: 5.3 10*3/MM3 (ref 1.87–8.4)
NEUTROPHILS NFR BLD AUTO: 66.5 % (ref 39–78)
OPIATES UR QL: NEGATIVE NG/ML
PCP UR QL: NEGATIVE NG/ML
PLATELET # BLD AUTO: 262 10*3/MM3 (ref 130–400)
PROPOXYPH UR QL: NEGATIVE NG/ML
RBC # BLD AUTO: 5.32 10*6/MM3 (ref 4.2–5.4)
RH BLD: POSITIVE
RPR SER QL: NON REACTIVE
RUBV IGG SERPL IA-ACNC: 6.26 INDEX
WBC # BLD AUTO: 7.95 10*3/MM3 (ref 4.8–10.8)

## 2017-06-22 ENCOUNTER — OFFICE VISIT (OUTPATIENT)
Dept: NEUROLOGY | Age: 27
End: 2017-06-22
Payer: COMMERCIAL

## 2017-06-22 VITALS
HEIGHT: 64 IN | OXYGEN SATURATION: 97 % | BODY MASS INDEX: 45.07 KG/M2 | WEIGHT: 264 LBS | DIASTOLIC BLOOD PRESSURE: 94 MMHG | HEART RATE: 100 BPM | SYSTOLIC BLOOD PRESSURE: 137 MMHG

## 2017-06-22 DIAGNOSIS — G50.0 TRIGEMINAL NEURALGIA OF RIGHT SIDE OF FACE: Primary | ICD-10-CM

## 2017-06-22 DIAGNOSIS — H60.331 ACUTE SWIMMER'S EAR OF RIGHT SIDE: ICD-10-CM

## 2017-06-22 PROCEDURE — 99213 OFFICE O/P EST LOW 20 MIN: CPT | Performed by: PHYSICIAN ASSISTANT

## 2017-06-22 RX ORDER — CEFPROZIL 500 MG/1
500 TABLET, FILM COATED ORAL 2 TIMES DAILY
Qty: 20 TABLET | Refills: 0 | Status: SHIPPED | OUTPATIENT
Start: 2017-06-22 | End: 2017-07-02

## 2017-06-26 ENCOUNTER — ROUTINE PRENATAL (OUTPATIENT)
Dept: OBSTETRICS AND GYNECOLOGY | Facility: CLINIC | Age: 27
End: 2017-06-26

## 2017-06-26 VITALS — DIASTOLIC BLOOD PRESSURE: 80 MMHG | SYSTOLIC BLOOD PRESSURE: 120 MMHG | BODY MASS INDEX: 43.6 KG/M2 | WEIGHT: 262 LBS

## 2017-06-26 DIAGNOSIS — G50.0 TRIGEMINAL NEURALGIA OF RIGHT SIDE OF FACE: ICD-10-CM

## 2017-06-26 DIAGNOSIS — Z78.9 NON-SMOKER: ICD-10-CM

## 2017-06-26 DIAGNOSIS — Z34.91 PRENATAL CARE, FIRST TRIMESTER: Primary | ICD-10-CM

## 2017-06-26 PROCEDURE — 0502F SUBSEQUENT PRENATAL CARE: CPT | Performed by: OBSTETRICS & GYNECOLOGY

## 2017-06-26 RX ORDER — CEFPROZIL 500 MG/1
TABLET, FILM COATED ORAL
COMMUNITY
Start: 2017-06-22 | End: 2017-07-02

## 2017-06-26 NOTE — PROGRESS NOTES
Vaginal bleeding warnings were given.  Pelvic pain warnings were given.  Patient was instructed to call the office for any concerns or problems.    Seeing neuro for trigeminal neuralgia flare up.    Treating for ear infection    Continue conservative measures for now.    ffDNA discussed; undecided.

## 2017-07-03 ENCOUNTER — PROCEDURE VISIT (OUTPATIENT)
Dept: OBSTETRICS AND GYNECOLOGY | Facility: CLINIC | Age: 27
End: 2017-07-03

## 2017-07-03 ENCOUNTER — ROUTINE PRENATAL (OUTPATIENT)
Dept: OBSTETRICS AND GYNECOLOGY | Facility: CLINIC | Age: 27
End: 2017-07-03

## 2017-07-03 VITALS — DIASTOLIC BLOOD PRESSURE: 80 MMHG | SYSTOLIC BLOOD PRESSURE: 130 MMHG | WEIGHT: 263 LBS | BODY MASS INDEX: 43.77 KG/M2

## 2017-07-03 DIAGNOSIS — O20.0 THREATENED ABORTION IN EARLY PREGNANCY: Primary | ICD-10-CM

## 2017-07-03 DIAGNOSIS — Z34.91 PRENATAL CARE, FIRST TRIMESTER: Primary | ICD-10-CM

## 2017-07-03 DIAGNOSIS — N76.0 BV (BACTERIAL VAGINOSIS): ICD-10-CM

## 2017-07-03 DIAGNOSIS — Z78.9 NON-SMOKER: ICD-10-CM

## 2017-07-03 DIAGNOSIS — B96.89 BV (BACTERIAL VAGINOSIS): ICD-10-CM

## 2017-07-03 DIAGNOSIS — O20.0 THREATENED ABORTION: ICD-10-CM

## 2017-07-03 PROCEDURE — 99213 OFFICE O/P EST LOW 20 MIN: CPT | Performed by: OBSTETRICS & GYNECOLOGY

## 2017-07-03 PROCEDURE — 76801 OB US < 14 WKS SINGLE FETUS: CPT | Performed by: OBSTETRICS & GYNECOLOGY

## 2017-07-03 RX ORDER — METRONIDAZOLE 500 MG/1
500 TABLET ORAL 2 TIMES DAILY
Qty: 14 TABLET | Refills: 0 | Status: SHIPPED | OUTPATIENT
Start: 2017-07-03 | End: 2017-07-10

## 2017-07-03 NOTE — PROGRESS NOTES
Vaginal bleeding warnings were given.  Pelvic pain warnings were given.  Patient was instructed to call the office for any concerns or problems.    Postcoital bleeding.  Rh +.  Has stopped now.  U/S reassuring.    BV symptoms.

## 2017-07-18 PROCEDURE — 87081 CULTURE SCREEN ONLY: CPT | Performed by: FAMILY MEDICINE

## 2017-08-01 ENCOUNTER — ROUTINE PRENATAL (OUTPATIENT)
Dept: OBSTETRICS AND GYNECOLOGY | Facility: CLINIC | Age: 27
End: 2017-08-01

## 2017-08-01 VITALS — SYSTOLIC BLOOD PRESSURE: 128 MMHG | DIASTOLIC BLOOD PRESSURE: 82 MMHG | WEIGHT: 260 LBS | BODY MASS INDEX: 43.27 KG/M2

## 2017-08-01 DIAGNOSIS — Z78.9 NON-SMOKER: ICD-10-CM

## 2017-08-01 DIAGNOSIS — Z34.92 PRENATAL CARE, SECOND TRIMESTER: Primary | ICD-10-CM

## 2017-08-01 PROCEDURE — 0502F SUBSEQUENT PRENATAL CARE: CPT | Performed by: OBSTETRICS & GYNECOLOGY

## 2017-08-01 NOTE — PROGRESS NOTES
SAB/PTL warnings    Had gender u/s today but waiting on party to find out results.    Has G&D u/s scheduled for later this month.

## 2017-08-15 ENCOUNTER — TELEPHONE (OUTPATIENT)
Dept: OBSTETRICS AND GYNECOLOGY | Facility: CLINIC | Age: 27
End: 2017-08-15

## 2017-08-15 NOTE — TELEPHONE ENCOUNTER
Keep appt and encourage patient to check BP daily and keep a log.  If persistently at that level, would prefer not to treat.  Would treat at >160/100 on a consistent basis

## 2017-08-29 ENCOUNTER — ROUTINE PRENATAL (OUTPATIENT)
Dept: OBSTETRICS AND GYNECOLOGY | Facility: CLINIC | Age: 27
End: 2017-08-29

## 2017-08-29 VITALS — BODY MASS INDEX: 43.6 KG/M2 | SYSTOLIC BLOOD PRESSURE: 140 MMHG | DIASTOLIC BLOOD PRESSURE: 90 MMHG | WEIGHT: 262 LBS

## 2017-08-29 DIAGNOSIS — Z78.9 NON-SMOKER: ICD-10-CM

## 2017-08-29 DIAGNOSIS — Z34.92 PRENATAL CARE, SECOND TRIMESTER: Primary | ICD-10-CM

## 2017-08-29 PROCEDURE — 0502F SUBSEQUENT PRENATAL CARE: CPT | Performed by: OBSTETRICS & GYNECOLOGY

## 2017-08-29 NOTE — PROGRESS NOTES
PTL warnings    Repeat G&D later as 1st attempt not complete.    BP noted.  Continue to watch.  Would lean towards considering CHTN if persists although technically after 20 weeks today.  If persists, will get BL labs and do antepartum testing appropriately.

## 2017-08-31 ENCOUNTER — TELEPHONE (OUTPATIENT)
Dept: OBSTETRICS AND GYNECOLOGY | Facility: CLINIC | Age: 27
End: 2017-08-31

## 2017-09-27 ENCOUNTER — ROUTINE PRENATAL (OUTPATIENT)
Dept: OBSTETRICS AND GYNECOLOGY | Facility: CLINIC | Age: 27
End: 2017-09-27

## 2017-09-27 VITALS — WEIGHT: 263 LBS | SYSTOLIC BLOOD PRESSURE: 130 MMHG | BODY MASS INDEX: 43.77 KG/M2 | DIASTOLIC BLOOD PRESSURE: 84 MMHG

## 2017-09-27 DIAGNOSIS — Z34.92 PRENATAL CARE, SECOND TRIMESTER: Primary | ICD-10-CM

## 2017-09-27 DIAGNOSIS — Z78.9 NON-SMOKER: ICD-10-CM

## 2017-09-27 PROCEDURE — 0502F SUBSEQUENT PRENATAL CARE: CPT | Performed by: OBSTETRICS & GYNECOLOGY

## 2017-09-27 RX ORDER — GABAPENTIN 600 MG/1
TABLET ORAL
COMMUNITY
Start: 2017-08-31 | End: 2017-11-06 | Stop reason: DRUGHIGH

## 2017-10-10 ENCOUNTER — TELEPHONE (OUTPATIENT)
Dept: OBSTETRICS AND GYNECOLOGY | Facility: CLINIC | Age: 27
End: 2017-10-10

## 2017-10-10 NOTE — TELEPHONE ENCOUNTER
Pt c/o not being able to eat much, or hardly at all, due to a flare up of trigeminal neuralgia. She is wondering if we have any suggestions on high protein content shakes she can drink in the mean time. She is concerned because her glucose test is tomorrow and she will not have had much to eat before it. Please review and advise.

## 2017-10-11 ENCOUNTER — ROUTINE PRENATAL (OUTPATIENT)
Dept: OBSTETRICS AND GYNECOLOGY | Facility: CLINIC | Age: 27
End: 2017-10-11

## 2017-10-11 VITALS — WEIGHT: 262 LBS | DIASTOLIC BLOOD PRESSURE: 74 MMHG | SYSTOLIC BLOOD PRESSURE: 128 MMHG | BODY MASS INDEX: 43.6 KG/M2

## 2017-10-11 DIAGNOSIS — Z34.92 PRENATAL CARE IN SECOND TRIMESTER: Primary | ICD-10-CM

## 2017-10-11 DIAGNOSIS — Z78.9 NON-SMOKER: ICD-10-CM

## 2017-10-11 DIAGNOSIS — G50.0 TRIGEMINAL NEURALGIA OF RIGHT SIDE OF FACE: ICD-10-CM

## 2017-10-11 PROCEDURE — 0502F SUBSEQUENT PRENATAL CARE: CPT | Performed by: OBSTETRICS & GYNECOLOGY

## 2017-10-11 NOTE — PROGRESS NOTES
Kick count instructions were reviewed and encouraged.  Labor signs and symptoms were reviewed.  Patient was encouraged to come to hospital or call for bleeding, leakage of fluid or any other concerns.    One hour normal.    Rh +.

## 2017-10-12 LAB
GLUCOSE 1H P 50 G GLC PO SERPL-MCNC: 106 MG/DL (ref 70–140)
HGB BLD-MCNC: 12.3 G/DL (ref 12–16)

## 2017-10-19 ENCOUNTER — ROUTINE PRENATAL (OUTPATIENT)
Dept: OBSTETRICS AND GYNECOLOGY | Facility: CLINIC | Age: 27
End: 2017-10-19

## 2017-10-19 ENCOUNTER — PROCEDURE VISIT (OUTPATIENT)
Dept: OBSTETRICS AND GYNECOLOGY | Facility: CLINIC | Age: 27
End: 2017-10-19

## 2017-10-19 ENCOUNTER — TELEPHONE (OUTPATIENT)
Dept: OBSTETRICS AND GYNECOLOGY | Facility: CLINIC | Age: 27
End: 2017-10-19

## 2017-10-19 VITALS — BODY MASS INDEX: 42.6 KG/M2 | WEIGHT: 256 LBS | SYSTOLIC BLOOD PRESSURE: 126 MMHG | DIASTOLIC BLOOD PRESSURE: 94 MMHG

## 2017-10-19 DIAGNOSIS — Z34.93 PRENATAL CARE IN THIRD TRIMESTER: ICD-10-CM

## 2017-10-19 DIAGNOSIS — O9A.212 TRAUMATIC INJURY DURING PREGNANCY IN SECOND TRIMESTER: Primary | ICD-10-CM

## 2017-10-19 DIAGNOSIS — G50.0 TRIGEMINAL NEURALGIA: Primary | ICD-10-CM

## 2017-10-19 PROCEDURE — 0502F SUBSEQUENT PRENATAL CARE: CPT | Performed by: NURSE PRACTITIONER

## 2017-10-19 PROCEDURE — 76815 OB US LIMITED FETUS(S): CPT | Performed by: OBSTETRICS & GYNECOLOGY

## 2017-10-19 RX ORDER — ACETAMINOPHEN 500 MG
500 TABLET ORAL EVERY 6 HOURS PRN
COMMUNITY
End: 2018-01-13 | Stop reason: HOSPADM

## 2017-10-19 NOTE — PROGRESS NOTES
Patient here for unscheduled visit.  Has trigeminal neuralgia and is having severe pain in her right jaw area.  States she had a pain shoot through her jaw this morning that was so intense that she passed out and fell on her stomach on the hardwood floor.  US done today was normal - , breech position, posterior placenta - no abnormalities.  To talk with Dr. Camarena to see if patient would be a candidate for botox injections r/t her severe pain.  ER precautions.  [facility] [place].

## 2017-10-19 NOTE — TELEPHONE ENCOUNTER
"28 week ob called stating she fell \"hard on my belly last night\" and has only felt maybe 3 movements since. Pt transferred to scheduling for  US and OV.  "

## 2017-10-23 ENCOUNTER — ROUTINE PRENATAL (OUTPATIENT)
Dept: OBSTETRICS AND GYNECOLOGY | Facility: CLINIC | Age: 27
End: 2017-10-23

## 2017-10-23 VITALS — SYSTOLIC BLOOD PRESSURE: 130 MMHG | WEIGHT: 256 LBS | DIASTOLIC BLOOD PRESSURE: 82 MMHG | BODY MASS INDEX: 42.6 KG/M2

## 2017-10-23 DIAGNOSIS — Z78.9 NON-SMOKER: ICD-10-CM

## 2017-10-23 DIAGNOSIS — G50.0 TRIGEMINAL NEURALGIA OF RIGHT SIDE OF FACE: ICD-10-CM

## 2017-10-23 DIAGNOSIS — Z34.93 PRENATAL CARE IN THIRD TRIMESTER: Primary | ICD-10-CM

## 2017-10-23 PROCEDURE — 0502F SUBSEQUENT PRENATAL CARE: CPT | Performed by: OBSTETRICS & GYNECOLOGY

## 2017-10-23 NOTE — PROGRESS NOTES
Kick count instructions were reviewed and encouraged.  Labor signs and symptoms were reviewed.  Patient was encouraged to come to hospital or call for bleeding, leakage of fluid or any other concerns.    Continues to have pain from trigeminal neuralgia; encouraged f/u with neurologist

## 2017-10-24 ENCOUNTER — TELEPHONE (OUTPATIENT)
Dept: OBSTETRICS AND GYNECOLOGY | Facility: CLINIC | Age: 27
End: 2017-10-24

## 2017-10-24 NOTE — TELEPHONE ENCOUNTER
MONSERRAT pt was just wanting to let you know she had called [facility] and left several messages and has not received a call back about her Neurontin.

## 2017-10-25 ENCOUNTER — TELEPHONE (OUTPATIENT)
Dept: NEUROLOGY | Age: 27
End: 2017-10-25

## 2017-10-25 NOTE — TELEPHONE ENCOUNTER
Per v/m    Patient is 29 weeks pregnant with Trigeminal Neuralgia. She is in a lot of pain and her OB/GYN Dr. Andi Deleon would like to see if Dr. Mariza Curry would increase her Gabapentin to 800 mg tid. Please advise.

## 2017-10-26 RX ORDER — GABAPENTIN 800 MG/1
800 TABLET ORAL 3 TIMES DAILY
Qty: 90 TABLET | Refills: 3 | Status: SHIPPED | OUTPATIENT
Start: 2017-10-26 | End: 2018-07-26

## 2017-10-30 NOTE — TELEPHONE ENCOUNTER
Left message to let the patient know Dr. Aixa Schneider had sent the increase in Gabapentin to  pharmacy.

## 2017-11-02 ENCOUNTER — TELEPHONE (OUTPATIENT)
Dept: NEUROSURGERY | Age: 27
End: 2017-11-02

## 2017-11-06 ENCOUNTER — PROCEDURE VISIT (OUTPATIENT)
Dept: OBSTETRICS AND GYNECOLOGY | Facility: CLINIC | Age: 27
End: 2017-11-06

## 2017-11-06 ENCOUNTER — ROUTINE PRENATAL (OUTPATIENT)
Dept: OBSTETRICS AND GYNECOLOGY | Facility: CLINIC | Age: 27
End: 2017-11-06

## 2017-11-06 VITALS — SYSTOLIC BLOOD PRESSURE: 124 MMHG | WEIGHT: 257 LBS | BODY MASS INDEX: 42.77 KG/M2 | DIASTOLIC BLOOD PRESSURE: 84 MMHG

## 2017-11-06 DIAGNOSIS — Z78.9 NON-SMOKER: ICD-10-CM

## 2017-11-06 DIAGNOSIS — Z34.93 PRENATAL CARE IN THIRD TRIMESTER: Primary | ICD-10-CM

## 2017-11-06 DIAGNOSIS — O99.210 OBESITY IN PREGNANCY: Primary | ICD-10-CM

## 2017-11-06 PROCEDURE — 0502F SUBSEQUENT PRENATAL CARE: CPT | Performed by: OBSTETRICS & GYNECOLOGY

## 2017-11-06 PROCEDURE — 76816 OB US FOLLOW-UP PER FETUS: CPT | Performed by: OBSTETRICS & GYNECOLOGY

## 2017-11-06 RX ORDER — GABAPENTIN 800 MG/1
800 TABLET ORAL 3 TIMES DAILY
COMMUNITY
Start: 2017-10-26 | End: 2018-01-13 | Stop reason: HOSPADM

## 2017-11-06 NOTE — PROGRESS NOTES
Kick count instructions were reviewed and encouraged.  Labor signs and symptoms were reviewed.  Patient was encouraged to come to hospital or call for bleeding, leakage of fluid or any other concerns.    Increased neurontin to 800 TID 10 days ago; little improvement if any noted although ability to eat has improved.    U/S reviewed and normal

## 2017-11-21 ENCOUNTER — ROUTINE PRENATAL (OUTPATIENT)
Dept: OBSTETRICS AND GYNECOLOGY | Facility: CLINIC | Age: 27
End: 2017-11-21

## 2017-11-21 VITALS — BODY MASS INDEX: 43.6 KG/M2 | WEIGHT: 262 LBS | DIASTOLIC BLOOD PRESSURE: 84 MMHG | SYSTOLIC BLOOD PRESSURE: 116 MMHG

## 2017-11-21 DIAGNOSIS — Z78.9 NON-SMOKER: ICD-10-CM

## 2017-11-21 DIAGNOSIS — Z34.93 PRENATAL CARE IN THIRD TRIMESTER: Primary | ICD-10-CM

## 2017-11-21 DIAGNOSIS — G50.0 TRIGEMINAL NEURALGIA OF RIGHT SIDE OF FACE: ICD-10-CM

## 2017-11-21 PROCEDURE — 0502F SUBSEQUENT PRENATAL CARE: CPT | Performed by: OBSTETRICS & GYNECOLOGY

## 2017-11-21 NOTE — PROGRESS NOTES
Kick count instructions were reviewed and encouraged.  Labor signs and symptoms were reviewed.  Patient was encouraged to come to hospital or call for bleeding, leakage of fluid or any other concerns.    Needs to call neurologist for trigeminal neuralgia

## 2017-12-05 ENCOUNTER — ROUTINE PRENATAL (OUTPATIENT)
Dept: OBSTETRICS AND GYNECOLOGY | Facility: CLINIC | Age: 27
End: 2017-12-05

## 2017-12-05 VITALS — DIASTOLIC BLOOD PRESSURE: 82 MMHG | WEIGHT: 260 LBS | SYSTOLIC BLOOD PRESSURE: 120 MMHG | BODY MASS INDEX: 43.27 KG/M2

## 2017-12-05 DIAGNOSIS — Z78.9 NON-SMOKER: ICD-10-CM

## 2017-12-05 DIAGNOSIS — Z34.93 PRENATAL CARE IN THIRD TRIMESTER: Primary | ICD-10-CM

## 2017-12-05 DIAGNOSIS — G50.0 TRIGEMINAL NEURALGIA OF RIGHT SIDE OF FACE: ICD-10-CM

## 2017-12-05 PROCEDURE — 0502F SUBSEQUENT PRENATAL CARE: CPT | Performed by: OBSTETRICS & GYNECOLOGY

## 2017-12-15 ENCOUNTER — ROUTINE PRENATAL (OUTPATIENT)
Dept: OBSTETRICS AND GYNECOLOGY | Facility: CLINIC | Age: 27
End: 2017-12-15

## 2017-12-15 VITALS — BODY MASS INDEX: 41.77 KG/M2 | WEIGHT: 251 LBS | DIASTOLIC BLOOD PRESSURE: 86 MMHG | SYSTOLIC BLOOD PRESSURE: 124 MMHG

## 2017-12-15 DIAGNOSIS — Z34.83 PRENATAL CARE, SUBSEQUENT PREGNANCY, THIRD TRIMESTER: Primary | ICD-10-CM

## 2017-12-15 DIAGNOSIS — Z34.93 PRENATAL CARE IN THIRD TRIMESTER: ICD-10-CM

## 2017-12-15 DIAGNOSIS — Z78.9 NON-SMOKER: ICD-10-CM

## 2017-12-15 DIAGNOSIS — G50.0 TRIGEMINAL NEURALGIA OF RIGHT SIDE OF FACE: ICD-10-CM

## 2017-12-15 LAB — EXTERNAL GROUP B STREP ANTIGEN: NORMAL

## 2017-12-15 PROCEDURE — 0502F SUBSEQUENT PRENATAL CARE: CPT | Performed by: OBSTETRICS & GYNECOLOGY

## 2017-12-15 RX ORDER — HYDROCODONE BITARTRATE AND ACETAMINOPHEN 10; 325 MG/1; MG/1
1 TABLET ORAL EVERY 8 HOURS PRN
COMMUNITY
End: 2017-12-29 | Stop reason: SDUPTHER

## 2017-12-15 NOTE — PROGRESS NOTES
Kick count instructions were reviewed and encouraged.  Labor signs and symptoms were reviewed.  Patient was encouraged to come to hospital or call for bleeding, leakage of fluid or any other concerns.    Cx collected    Has taken 4 lortabs in the last week without improvement.    Unable to work due to pain associated with talking.   Will go ahead and start leave now in an effort to improve pain from TN.

## 2017-12-16 ENCOUNTER — HOSPITAL ENCOUNTER (OUTPATIENT)
Facility: HOSPITAL | Age: 27
Discharge: HOME | End: 2017-12-16
Attending: OBSTETRICS & GYNECOLOGY | Admitting: OBSTETRICS & GYNECOLOGY

## 2017-12-16 VITALS
HEART RATE: 85 BPM | SYSTOLIC BLOOD PRESSURE: 123 MMHG | BODY MASS INDEX: 42.53 KG/M2 | WEIGHT: 255.25 LBS | HEIGHT: 65 IN | DIASTOLIC BLOOD PRESSURE: 85 MMHG

## 2017-12-16 PROBLEM — Z34.90 SUPERVISION OF NORMAL PREGNANCY: Status: ACTIVE | Noted: 2017-12-16

## 2017-12-16 LAB — A1 MICROGLOB PLACENTAL VAG QL: NEGATIVE

## 2017-12-16 PROCEDURE — G0463 HOSPITAL OUTPT CLINIC VISIT: HCPCS

## 2017-12-16 PROCEDURE — G0378 HOSPITAL OBSERVATION PER HR: HCPCS

## 2017-12-16 PROCEDURE — 84112 EVAL AMNIOTIC FLUID PROTEIN: CPT | Performed by: OBSTETRICS & GYNECOLOGY

## 2017-12-18 LAB
C TRACH RRNA SPEC QL NAA+PROBE: NEGATIVE
GP B STREP DNA SPEC QL NAA+PROBE: NEGATIVE
N GONORRHOEA RRNA SPEC QL NAA+PROBE: NEGATIVE

## 2017-12-22 ENCOUNTER — ROUTINE PRENATAL (OUTPATIENT)
Dept: OBSTETRICS AND GYNECOLOGY | Facility: CLINIC | Age: 27
End: 2017-12-22

## 2017-12-22 ENCOUNTER — PROCEDURE VISIT (OUTPATIENT)
Dept: OBSTETRICS AND GYNECOLOGY | Facility: CLINIC | Age: 27
End: 2017-12-22

## 2017-12-22 VITALS — DIASTOLIC BLOOD PRESSURE: 78 MMHG | WEIGHT: 250 LBS | BODY MASS INDEX: 41.65 KG/M2 | SYSTOLIC BLOOD PRESSURE: 122 MMHG

## 2017-12-22 DIAGNOSIS — Z36.89 EVALUATE FETAL POSITION USING ULTRASOUND: Primary | ICD-10-CM

## 2017-12-22 DIAGNOSIS — Z34.93 PREGNANT AND NOT YET DELIVERED IN THIRD TRIMESTER: Primary | ICD-10-CM

## 2017-12-22 PROCEDURE — 76815 OB US LIMITED FETUS(S): CPT | Performed by: OBSTETRICS & GYNECOLOGY

## 2017-12-22 PROCEDURE — 0502F SUBSEQUENT PRENATAL CARE: CPT | Performed by: OBSTETRICS & GYNECOLOGY

## 2017-12-22 NOTE — PROGRESS NOTES
Good FM, fetus has been breech and US today showed that the baby has turned to vertex.  Keep kick counts and call if there is a significant decrease in fetal movement.  Patient's GBS and genprobe were neg.

## 2017-12-25 ENCOUNTER — HOSPITAL ENCOUNTER (OUTPATIENT)
Facility: HOSPITAL | Age: 27
Setting detail: OBSERVATION
Discharge: HOME | End: 2017-12-25
Attending: OBSTETRICS & GYNECOLOGY | Admitting: OBSTETRICS & GYNECOLOGY

## 2017-12-25 VITALS
BODY MASS INDEX: 41.82 KG/M2 | WEIGHT: 251 LBS | HEART RATE: 87 BPM | RESPIRATION RATE: 18 BRPM | SYSTOLIC BLOOD PRESSURE: 134 MMHG | OXYGEN SATURATION: 100 % | TEMPERATURE: 98.5 F | DIASTOLIC BLOOD PRESSURE: 91 MMHG | HEIGHT: 65 IN

## 2017-12-25 PROBLEM — O47.9 THREATENED LABOR AT TERM: Status: ACTIVE | Noted: 2017-12-25

## 2017-12-25 LAB — A1 MICROGLOB PLACENTAL VAG QL: NEGATIVE

## 2017-12-25 PROCEDURE — G0463 HOSPITAL OUTPT CLINIC VISIT: HCPCS

## 2017-12-25 PROCEDURE — 84112 EVAL AMNIOTIC FLUID PROTEIN: CPT | Performed by: OBSTETRICS & GYNECOLOGY

## 2017-12-25 PROCEDURE — G0378 HOSPITAL OBSERVATION PER HR: HCPCS

## 2017-12-29 ENCOUNTER — ROUTINE PRENATAL (OUTPATIENT)
Dept: OBSTETRICS AND GYNECOLOGY | Facility: CLINIC | Age: 27
End: 2017-12-29

## 2017-12-29 VITALS — SYSTOLIC BLOOD PRESSURE: 122 MMHG | WEIGHT: 249 LBS | DIASTOLIC BLOOD PRESSURE: 90 MMHG | BODY MASS INDEX: 41.44 KG/M2

## 2017-12-29 DIAGNOSIS — G50.0 TRIGEMINAL NEURALGIA OF RIGHT SIDE OF FACE: ICD-10-CM

## 2017-12-29 DIAGNOSIS — Z34.03 ENCOUNTER FOR SUPERVISION OF NORMAL FIRST PREGNANCY IN THIRD TRIMESTER: Primary | ICD-10-CM

## 2017-12-29 PROBLEM — O47.9 THREATENED LABOR AT TERM: Status: RESOLVED | Noted: 2017-12-25 | Resolved: 2017-12-29

## 2017-12-29 PROCEDURE — 0502F SUBSEQUENT PRENATAL CARE: CPT | Performed by: OBSTETRICS & GYNECOLOGY

## 2017-12-29 RX ORDER — HYDROCODONE BITARTRATE AND ACETAMINOPHEN 10; 325 MG/1; MG/1
1 TABLET ORAL EVERY 8 HOURS PRN
Qty: 10 TABLET | Refills: 0 | Status: SHIPPED | OUTPATIENT
Start: 2017-12-29 | End: 2018-01-13 | Stop reason: HOSPADM

## 2018-01-04 ENCOUNTER — ROUTINE PRENATAL (OUTPATIENT)
Dept: OBSTETRICS AND GYNECOLOGY | Facility: CLINIC | Age: 28
End: 2018-01-04

## 2018-01-04 VITALS — DIASTOLIC BLOOD PRESSURE: 90 MMHG | WEIGHT: 253 LBS | SYSTOLIC BLOOD PRESSURE: 108 MMHG | BODY MASS INDEX: 42.1 KG/M2

## 2018-01-04 DIAGNOSIS — Z34.03 ENCOUNTER FOR SUPERVISION OF NORMAL FIRST PREGNANCY IN THIRD TRIMESTER: Primary | ICD-10-CM

## 2018-01-04 PROCEDURE — 0502F SUBSEQUENT PRENATAL CARE: CPT | Performed by: OBSTETRICS & GYNECOLOGY

## 2018-01-04 NOTE — PROGRESS NOTES
Good fetal movement  Has had some contractions over the last couple of days  Cervix soft and anterior

## 2018-01-08 ENCOUNTER — ROUTINE PRENATAL (OUTPATIENT)
Dept: OBSTETRICS AND GYNECOLOGY | Facility: CLINIC | Age: 28
End: 2018-01-08

## 2018-01-08 VITALS — WEIGHT: 253 LBS | SYSTOLIC BLOOD PRESSURE: 114 MMHG | DIASTOLIC BLOOD PRESSURE: 90 MMHG | BODY MASS INDEX: 42.1 KG/M2

## 2018-01-08 DIAGNOSIS — Z78.9 NON-SMOKER: ICD-10-CM

## 2018-01-08 DIAGNOSIS — Z34.93 PRENATAL CARE IN THIRD TRIMESTER: Primary | ICD-10-CM

## 2018-01-08 PROCEDURE — 0502F SUBSEQUENT PRENATAL CARE: CPT | Performed by: OBSTETRICS & GYNECOLOGY

## 2018-01-08 NOTE — PROGRESS NOTES
Kick count instructions were reviewed and encouraged.  Labor signs and symptoms were reviewed.  Patient was encouraged to come to hospital or call for bleeding, leakage of fluid or any other concerns.    Desires trial of labor.  Had previously discussed worries about pushing with facial pain from TN.  But now that this has gotten better, wants trial of labor.

## 2018-01-10 ENCOUNTER — HOSPITAL ENCOUNTER (INPATIENT)
Facility: HOSPITAL | Age: 28
LOS: 3 days | Discharge: HOME | End: 2018-01-13
Attending: OBSTETRICS & GYNECOLOGY | Admitting: OBSTETRICS & GYNECOLOGY

## 2018-01-10 ENCOUNTER — ANESTHESIA EVENT (OUTPATIENT)
Dept: LABOR AND DELIVERY | Facility: HOSPITAL | Age: 28
End: 2018-01-10

## 2018-01-10 ENCOUNTER — ANESTHESIA (OUTPATIENT)
Dept: LABOR AND DELIVERY | Facility: HOSPITAL | Age: 28
End: 2018-01-10

## 2018-01-10 ENCOUNTER — HOSPITAL ENCOUNTER (EMERGENCY)
Facility: HOSPITAL | Age: 28
Discharge: HOME | End: 2018-01-10
Attending: EMERGENCY MEDICINE | Admitting: EMERGENCY MEDICINE

## 2018-01-10 ENCOUNTER — ROUTINE PRENATAL (OUTPATIENT)
Dept: OBSTETRICS AND GYNECOLOGY | Facility: CLINIC | Age: 28
End: 2018-01-10

## 2018-01-10 VITALS
OXYGEN SATURATION: 98 % | WEIGHT: 250 LBS | HEART RATE: 107 BPM | DIASTOLIC BLOOD PRESSURE: 78 MMHG | TEMPERATURE: 97.8 F | HEIGHT: 65 IN | SYSTOLIC BLOOD PRESSURE: 127 MMHG | RESPIRATION RATE: 16 BRPM | BODY MASS INDEX: 41.65 KG/M2

## 2018-01-10 VITALS — WEIGHT: 252 LBS | BODY MASS INDEX: 41.93 KG/M2 | DIASTOLIC BLOOD PRESSURE: 92 MMHG | SYSTOLIC BLOOD PRESSURE: 118 MMHG

## 2018-01-10 DIAGNOSIS — Z78.9 NON-SMOKER: ICD-10-CM

## 2018-01-10 DIAGNOSIS — Z34.93 PRENATAL CARE IN THIRD TRIMESTER: Primary | ICD-10-CM

## 2018-01-10 DIAGNOSIS — Z34.90 TERM PREGNANCY: Primary | ICD-10-CM

## 2018-01-10 DIAGNOSIS — G50.0 TRIGEMINAL NEURALGIA OF RIGHT SIDE OF FACE: ICD-10-CM

## 2018-01-10 DIAGNOSIS — G50.0 TRIGEMINAL NEURALGIA OF RIGHT SIDE OF FACE: Primary | ICD-10-CM

## 2018-01-10 DIAGNOSIS — Z34.93 PRENATAL CARE IN THIRD TRIMESTER: ICD-10-CM

## 2018-01-10 LAB
ABO GROUP BLD: NORMAL
BLD GP AB SCN SERPL QL: NEGATIVE
DEPRECATED RDW RBC AUTO: 38.7 FL (ref 40–54)
ERYTHROCYTE [DISTWIDTH] IN BLOOD BY AUTOMATED COUNT: 14.2 % (ref 12–15)
HCT VFR BLD AUTO: 39.4 % (ref 37–47)
HGB BLD-MCNC: 13.8 G/DL (ref 12–16)
MCH RBC QN AUTO: 27.2 PG (ref 28–32)
MCHC RBC AUTO-ENTMCNC: 35 G/DL (ref 33–36)
MCV RBC AUTO: 77.7 FL (ref 82–98)
PLATELET # BLD AUTO: 258 10*3/MM3 (ref 130–400)
PMV BLD AUTO: 11.2 FL (ref 6–12)
RBC # BLD AUTO: 5.07 10*6/MM3 (ref 4.2–5.4)
RH BLD: POSITIVE
WBC NRBC COR # BLD: 11.61 10*3/MM3 (ref 4.8–10.8)

## 2018-01-10 PROCEDURE — 51703 INSERT BLADDER CATH COMPLEX: CPT

## 2018-01-10 PROCEDURE — 96374 THER/PROPH/DIAG INJ IV PUSH: CPT

## 2018-01-10 PROCEDURE — 86850 RBC ANTIBODY SCREEN: CPT | Performed by: OBSTETRICS & GYNECOLOGY

## 2018-01-10 PROCEDURE — 25010000002 HYDROMORPHONE PER 4 MG: Performed by: EMERGENCY MEDICINE

## 2018-01-10 PROCEDURE — C1765 ADHESION BARRIER: HCPCS | Performed by: OBSTETRICS & GYNECOLOGY

## 2018-01-10 PROCEDURE — 86901 BLOOD TYPING SEROLOGIC RH(D): CPT | Performed by: OBSTETRICS & GYNECOLOGY

## 2018-01-10 PROCEDURE — 88307 TISSUE EXAM BY PATHOLOGIST: CPT | Performed by: OBSTETRICS & GYNECOLOGY

## 2018-01-10 PROCEDURE — 25010000002 MORPHINE PER 10 MG: Performed by: EMERGENCY MEDICINE

## 2018-01-10 PROCEDURE — 86900 BLOOD TYPING SEROLOGIC ABO: CPT | Performed by: OBSTETRICS & GYNECOLOGY

## 2018-01-10 PROCEDURE — 25010000002 FENTANYL CITRATE (PF) 100 MCG/2ML SOLUTION: Performed by: NURSE ANESTHETIST, CERTIFIED REGISTERED

## 2018-01-10 PROCEDURE — 0502F SUBSEQUENT PRENATAL CARE: CPT | Performed by: OBSTETRICS & GYNECOLOGY

## 2018-01-10 PROCEDURE — 99283 EMERGENCY DEPT VISIT LOW MDM: CPT

## 2018-01-10 PROCEDURE — 96375 TX/PRO/DX INJ NEW DRUG ADDON: CPT

## 2018-01-10 PROCEDURE — 59510 CESAREAN DELIVERY: CPT | Performed by: OBSTETRICS & GYNECOLOGY

## 2018-01-10 PROCEDURE — 25010000002 HYDROMORPHONE PER 4 MG: Performed by: NURSE ANESTHETIST, CERTIFIED REGISTERED

## 2018-01-10 PROCEDURE — 85027 COMPLETE CBC AUTOMATED: CPT | Performed by: OBSTETRICS & GYNECOLOGY

## 2018-01-10 RX ORDER — PROMETHAZINE HYDROCHLORIDE 25 MG/1
12.5 TABLET ORAL EVERY 6 HOURS PRN
Status: DISCONTINUED | OUTPATIENT
Start: 2018-01-10 | End: 2018-01-10 | Stop reason: HOSPADM

## 2018-01-10 RX ORDER — MISOPROSTOL 100 UG/1
800 TABLET ORAL AS NEEDED
Status: CANCELLED | OUTPATIENT
Start: 2018-01-10

## 2018-01-10 RX ORDER — PRENATAL VIT/IRON FUM/FOLIC AC 27MG-0.8MG
1 TABLET ORAL DAILY
Status: DISCONTINUED | OUTPATIENT
Start: 2018-01-11 | End: 2018-01-13 | Stop reason: HOSPADM

## 2018-01-10 RX ORDER — LIDOCAINE HYDROCHLORIDE 10 MG/ML
5 INJECTION, SOLUTION INFILTRATION; PERINEURAL AS NEEDED
Status: DISCONTINUED | OUTPATIENT
Start: 2018-01-10 | End: 2018-01-10 | Stop reason: SDUPTHER

## 2018-01-10 RX ORDER — SODIUM CHLORIDE 0.9 % (FLUSH) 0.9 %
10 SYRINGE (ML) INJECTION AS NEEDED
Status: DISCONTINUED | OUTPATIENT
Start: 2018-01-10 | End: 2018-01-10 | Stop reason: HOSPADM

## 2018-01-10 RX ORDER — OXYCODONE AND ACETAMINOPHEN 7.5; 325 MG/1; MG/1
2 TABLET ORAL EVERY 4 HOURS PRN
Status: DISPENSED | OUTPATIENT
Start: 2018-01-10 | End: 2018-01-11

## 2018-01-10 RX ORDER — OXYCODONE HYDROCHLORIDE AND ACETAMINOPHEN 5; 325 MG/1; MG/1
1 TABLET ORAL EVERY 4 HOURS PRN
Status: CANCELLED | OUTPATIENT
Start: 2018-01-10 | End: 2018-01-20

## 2018-01-10 RX ORDER — OXYTOCIN 10 [USP'U]/ML
INJECTION, SOLUTION INTRAMUSCULAR; INTRAVENOUS AS NEEDED
Status: DISCONTINUED | OUTPATIENT
Start: 2018-01-10 | End: 2018-01-10 | Stop reason: SURG

## 2018-01-10 RX ORDER — DIPHENHYDRAMINE HYDROCHLORIDE 50 MG/ML
25 INJECTION INTRAMUSCULAR; INTRAVENOUS NIGHTLY PRN
Status: DISCONTINUED | OUTPATIENT
Start: 2018-01-10 | End: 2018-01-10 | Stop reason: HOSPADM

## 2018-01-10 RX ORDER — PROMETHAZINE HYDROCHLORIDE 25 MG/1
12.5 TABLET ORAL EVERY 6 HOURS PRN
Status: CANCELLED | OUTPATIENT
Start: 2018-01-10

## 2018-01-10 RX ORDER — BUTORPHANOL TARTRATE 1 MG/ML
0.5 INJECTION, SOLUTION INTRAMUSCULAR; INTRAVENOUS EVERY 6 HOURS PRN
Status: ACTIVE | OUTPATIENT
Start: 2018-01-10 | End: 2018-01-11

## 2018-01-10 RX ORDER — DIPHENHYDRAMINE HYDROCHLORIDE 50 MG/ML
25 INJECTION INTRAMUSCULAR; INTRAVENOUS NIGHTLY PRN
Status: CANCELLED | OUTPATIENT
Start: 2018-01-10

## 2018-01-10 RX ORDER — OXYCODONE AND ACETAMINOPHEN 7.5; 325 MG/1; MG/1
1 TABLET ORAL EVERY 4 HOURS PRN
Status: DISPENSED | OUTPATIENT
Start: 2018-01-10 | End: 2018-01-11

## 2018-01-10 RX ORDER — OXYCODONE HYDROCHLORIDE AND ACETAMINOPHEN 5; 325 MG/1; MG/1
1 TABLET ORAL EVERY 4 HOURS PRN
Status: DISCONTINUED | OUTPATIENT
Start: 2018-01-10 | End: 2018-01-10 | Stop reason: HOSPADM

## 2018-01-10 RX ORDER — ONDANSETRON 2 MG/ML
4 INJECTION INTRAMUSCULAR; INTRAVENOUS EVERY 6 HOURS PRN
Status: ACTIVE | OUTPATIENT
Start: 2018-01-10 | End: 2018-01-11

## 2018-01-10 RX ORDER — NALOXONE HCL 0.4 MG/ML
0.4 VIAL (ML) INJECTION
Status: ACTIVE | OUTPATIENT
Start: 2018-01-10 | End: 2018-01-11

## 2018-01-10 RX ORDER — PROMETHAZINE HYDROCHLORIDE 12.5 MG/1
12.5 SUPPOSITORY RECTAL EVERY 6 HOURS PRN
Status: DISCONTINUED | OUTPATIENT
Start: 2018-01-10 | End: 2018-01-10 | Stop reason: HOSPADM

## 2018-01-10 RX ORDER — ONDANSETRON 4 MG/1
4 TABLET, FILM COATED ORAL EVERY 8 HOURS PRN
Status: DISCONTINUED | OUTPATIENT
Start: 2018-01-10 | End: 2018-01-13 | Stop reason: HOSPADM

## 2018-01-10 RX ORDER — SODIUM CHLORIDE 0.9 % (FLUSH) 0.9 %
1-10 SYRINGE (ML) INJECTION AS NEEDED
Status: CANCELLED | OUTPATIENT
Start: 2018-01-10

## 2018-01-10 RX ORDER — TRISODIUM CITRATE DIHYDRATE AND CITRIC ACID MONOHYDRATE 500; 334 MG/5ML; MG/5ML
15 SOLUTION ORAL ONCE
Status: COMPLETED | OUTPATIENT
Start: 2018-01-10 | End: 2018-01-10

## 2018-01-10 RX ORDER — NALOXONE HCL 0.4 MG/ML
0.1 VIAL (ML) INJECTION
Status: DISCONTINUED | OUTPATIENT
Start: 2018-01-11 | End: 2018-01-13 | Stop reason: HOSPADM

## 2018-01-10 RX ORDER — PROMETHAZINE HYDROCHLORIDE 25 MG/ML
12.5 INJECTION, SOLUTION INTRAMUSCULAR; INTRAVENOUS EVERY 6 HOURS PRN
Status: DISCONTINUED | OUTPATIENT
Start: 2018-01-10 | End: 2018-01-10 | Stop reason: HOSPADM

## 2018-01-10 RX ORDER — METHYLERGONOVINE MALEATE 0.2 MG/ML
200 INJECTION INTRAVENOUS AS NEEDED
Status: CANCELLED | OUTPATIENT
Start: 2018-01-10

## 2018-01-10 RX ORDER — DIPHENHYDRAMINE HCL 25 MG
25 CAPSULE ORAL NIGHTLY PRN
Status: CANCELLED | OUTPATIENT
Start: 2018-01-10

## 2018-01-10 RX ORDER — METHYLERGONOVINE MALEATE 0.2 MG/ML
200 INJECTION INTRAVENOUS AS NEEDED
Status: DISCONTINUED | OUTPATIENT
Start: 2018-01-10 | End: 2018-01-10 | Stop reason: HOSPADM

## 2018-01-10 RX ORDER — DOCUSATE SODIUM 100 MG/1
100 CAPSULE, LIQUID FILLED ORAL 2 TIMES DAILY PRN
Status: DISCONTINUED | OUTPATIENT
Start: 2018-01-10 | End: 2018-01-13 | Stop reason: HOSPADM

## 2018-01-10 RX ORDER — NALBUPHINE HCL 10 MG/ML
2.5 AMPUL (ML) INJECTION EVERY 4 HOURS PRN
Status: ACTIVE | OUTPATIENT
Start: 2018-01-10 | End: 2018-01-11

## 2018-01-10 RX ORDER — PROMETHAZINE HYDROCHLORIDE 25 MG/ML
12.5 INJECTION, SOLUTION INTRAMUSCULAR; INTRAVENOUS EVERY 6 HOURS PRN
Status: CANCELLED | OUTPATIENT
Start: 2018-01-10

## 2018-01-10 RX ORDER — MISOPROSTOL 200 UG/1
800 TABLET ORAL AS NEEDED
Status: DISCONTINUED | OUTPATIENT
Start: 2018-01-10 | End: 2018-01-10 | Stop reason: HOSPADM

## 2018-01-10 RX ORDER — MORPHINE SULFATE 4 MG/ML
4 INJECTION, SOLUTION INTRAMUSCULAR; INTRAVENOUS ONCE
Status: COMPLETED | OUTPATIENT
Start: 2018-01-10 | End: 2018-01-10

## 2018-01-10 RX ORDER — FAMOTIDINE 10 MG/ML
20 INJECTION, SOLUTION INTRAVENOUS ONCE AS NEEDED
Status: COMPLETED | OUTPATIENT
Start: 2018-01-10 | End: 2018-01-10

## 2018-01-10 RX ORDER — OXYCODONE AND ACETAMINOPHEN 7.5; 325 MG/1; MG/1
1 TABLET ORAL EVERY 4 HOURS PRN
Status: DISCONTINUED | OUTPATIENT
Start: 2018-01-11 | End: 2018-01-13 | Stop reason: HOSPADM

## 2018-01-10 RX ORDER — LIDOCAINE HYDROCHLORIDE 10 MG/ML
5 INJECTION, SOLUTION INFILTRATION; PERINEURAL AS NEEDED
Status: CANCELLED | OUTPATIENT
Start: 2018-01-10

## 2018-01-10 RX ORDER — SODIUM CHLORIDE 0.9 % (FLUSH) 0.9 %
1-10 SYRINGE (ML) INJECTION AS NEEDED
Status: DISCONTINUED | OUTPATIENT
Start: 2018-01-10 | End: 2018-01-10 | Stop reason: HOSPADM

## 2018-01-10 RX ORDER — BUPIVACAINE HYDROCHLORIDE 7.5 MG/ML
INJECTION, SOLUTION EPIDURAL; RETROBULBAR AS NEEDED
Status: DISCONTINUED | OUTPATIENT
Start: 2018-01-10 | End: 2018-01-10 | Stop reason: SURG

## 2018-01-10 RX ORDER — DIPHENHYDRAMINE HCL 25 MG
25 CAPSULE ORAL NIGHTLY PRN
Status: DISCONTINUED | OUTPATIENT
Start: 2018-01-10 | End: 2018-01-10 | Stop reason: HOSPADM

## 2018-01-10 RX ORDER — CARBOPROST TROMETHAMINE 250 UG/ML
250 INJECTION, SOLUTION INTRAMUSCULAR AS NEEDED
Status: DISCONTINUED | OUTPATIENT
Start: 2018-01-10 | End: 2018-01-10 | Stop reason: HOSPADM

## 2018-01-10 RX ORDER — HYDROMORPHONE HCL 110MG/55ML
0.5 PATIENT CONTROLLED ANALGESIA SYRINGE INTRAVENOUS
Status: DISCONTINUED | OUTPATIENT
Start: 2018-01-11 | End: 2018-01-13 | Stop reason: HOSPADM

## 2018-01-10 RX ORDER — OXYCODONE AND ACETAMINOPHEN 10; 325 MG/1; MG/1
1 TABLET ORAL EVERY 4 HOURS PRN
Status: DISCONTINUED | OUTPATIENT
Start: 2018-01-11 | End: 2018-01-13 | Stop reason: HOSPADM

## 2018-01-10 RX ORDER — CARBOPROST TROMETHAMINE 250 UG/ML
250 INJECTION, SOLUTION INTRAMUSCULAR AS NEEDED
Status: CANCELLED | OUTPATIENT
Start: 2018-01-10

## 2018-01-10 RX ORDER — SIMETHICONE 80 MG
80 TABLET,CHEWABLE ORAL 4 TIMES DAILY PRN
Status: DISCONTINUED | OUTPATIENT
Start: 2018-01-10 | End: 2018-01-13 | Stop reason: HOSPADM

## 2018-01-10 RX ORDER — FENTANYL CITRATE 50 UG/ML
INJECTION, SOLUTION INTRAMUSCULAR; INTRAVENOUS AS NEEDED
Status: DISCONTINUED | OUTPATIENT
Start: 2018-01-10 | End: 2018-01-10 | Stop reason: SURG

## 2018-01-10 RX ORDER — PROMETHAZINE HYDROCHLORIDE 25 MG/1
12.5 SUPPOSITORY RECTAL EVERY 6 HOURS PRN
Status: CANCELLED | OUTPATIENT
Start: 2018-01-10

## 2018-01-10 RX ORDER — SODIUM CHLORIDE, SODIUM LACTATE, POTASSIUM CHLORIDE, CALCIUM CHLORIDE 600; 310; 30; 20 MG/100ML; MG/100ML; MG/100ML; MG/100ML
INJECTION, SOLUTION INTRAVENOUS CONTINUOUS PRN
Status: DISCONTINUED | OUTPATIENT
Start: 2018-01-10 | End: 2018-01-10 | Stop reason: SURG

## 2018-01-10 RX ORDER — SODIUM CHLORIDE, SODIUM LACTATE, POTASSIUM CHLORIDE, CALCIUM CHLORIDE 600; 310; 30; 20 MG/100ML; MG/100ML; MG/100ML; MG/100ML
125 INJECTION, SOLUTION INTRAVENOUS CONTINUOUS
Status: DISCONTINUED | OUTPATIENT
Start: 2018-01-10 | End: 2018-01-13 | Stop reason: HOSPADM

## 2018-01-10 RX ADMIN — Medication 2 G: at 17:34

## 2018-01-10 RX ADMIN — SODIUM CHLORIDE, POTASSIUM CHLORIDE, SODIUM LACTATE AND CALCIUM CHLORIDE: 600; 310; 30; 20 INJECTION, SOLUTION INTRAVENOUS at 16:23

## 2018-01-10 RX ADMIN — SODIUM CHLORIDE, POTASSIUM CHLORIDE, SODIUM LACTATE AND CALCIUM CHLORIDE: 600; 310; 30; 20 INJECTION, SOLUTION INTRAVENOUS at 17:50

## 2018-01-10 RX ADMIN — FAMOTIDINE 20 MG: 10 INJECTION, SOLUTION INTRAVENOUS at 16:32

## 2018-01-10 RX ADMIN — SODIUM CITRATE AND CITRIC ACID MONOHYDRATE 15 ML: 500; 334 SOLUTION ORAL at 16:32

## 2018-01-10 RX ADMIN — FENTANYL CITRATE 15 MCG: 50 INJECTION, SOLUTION INTRAMUSCULAR; INTRAVENOUS at 17:27

## 2018-01-10 RX ADMIN — BUPIVACAINE HYDROCHLORIDE 1.6 ML: 7.5 INJECTION, SOLUTION EPIDURAL; RETROBULBAR at 17:27

## 2018-01-10 RX ADMIN — HYDROMORPHONE HYDROCHLORIDE 150 MCG: 1 INJECTION, SOLUTION INTRAMUSCULAR; INTRAVENOUS; SUBCUTANEOUS at 17:27

## 2018-01-10 RX ADMIN — HYDROMORPHONE HYDROCHLORIDE 1 MG: 1 INJECTION, SOLUTION INTRAMUSCULAR; INTRAVENOUS; SUBCUTANEOUS at 06:04

## 2018-01-10 RX ADMIN — SODIUM CHLORIDE, POTASSIUM CHLORIDE, SODIUM LACTATE AND CALCIUM CHLORIDE 125 ML/HR: 600; 310; 30; 20 INJECTION, SOLUTION INTRAVENOUS at 23:59

## 2018-01-10 RX ADMIN — MORPHINE SULFATE 4 MG: 4 INJECTION, SOLUTION INTRAMUSCULAR; INTRAVENOUS at 05:29

## 2018-01-10 RX ADMIN — OXYTOCIN 20 UNITS: 10 INJECTION INTRAVENOUS at 17:39

## 2018-01-10 RX ADMIN — SODIUM CHLORIDE, SODIUM LACTATE, POTASSIUM CHLORIDE, AND CALCIUM CHLORIDE 1000 ML: 600; 310; 30; 20 INJECTION, SOLUTION INTRAVENOUS at 15:31

## 2018-01-10 RX ADMIN — SODIUM CHLORIDE, SODIUM LACTATE, POTASSIUM CHLORIDE, AND CALCIUM CHLORIDE 1000 ML: 600; 310; 30; 20 INJECTION, SOLUTION INTRAVENOUS at 14:52

## 2018-01-10 RX ADMIN — FENTANYL CITRATE 85 MCG: 50 INJECTION, SOLUTION INTRAMUSCULAR; INTRAVENOUS at 17:43

## 2018-01-10 RX ADMIN — OXYTOCIN 20 UNITS: 10 INJECTION INTRAVENOUS at 17:50

## 2018-01-10 RX ADMIN — HYDROMORPHONE HYDROCHLORIDE 850 MCG: 1 INJECTION, SOLUTION INTRAMUSCULAR; INTRAVENOUS; SUBCUTANEOUS at 17:43

## 2018-01-10 NOTE — ANESTHESIA PREPROCEDURE EVALUATION
Anesthesia Evaluation     NPO Solid Status: > 8 hours  NPO Liquid Status: > 6 hours     Airway   Mallampati: II  TM distance: >3 FB  no difficulty expected  Dental      Pulmonary - negative pulmonary ROS and normal exam   Cardiovascular - normal exam    (+) hypertension,       Neuro/Psych- negative ROS  GI/Hepatic/Renal/Endo    (+) obesity,      Musculoskeletal (-) negative ROS    Abdominal  - normal exam   Substance History - negative use     OB/GYN    (+) Pregnant,         Other - negative ROS                                        WBC   Date Value Ref Range Status   01/10/2018 11.61 (H) 4.80 - 10.80 10*3/mm3 Final     RBC   Date Value Ref Range Status   01/10/2018 5.07 4.20 - 5.40 10*6/mm3 Final     Hemoglobin   Date Value Ref Range Status   01/10/2018 13.8 12.0 - 16.0 g/dL Final     Hematocrit   Date Value Ref Range Status   01/10/2018 39.4 37.0 - 47.0 % Final     MCV   Date Value Ref Range Status   01/10/2018 77.7 (L) 82.0 - 98.0 fL Final     MCH   Date Value Ref Range Status   01/10/2018 27.2 (L) 28.0 - 32.0 pg Final     MCHC   Date Value Ref Range Status   01/10/2018 35.0 33.0 - 36.0 g/dL Final     RDW   Date Value Ref Range Status   01/10/2018 14.2 12.0 - 15.0 % Final     RDW-SD   Date Value Ref Range Status   01/10/2018 38.7 (L) 40.0 - 54.0 fl Final     MPV   Date Value Ref Range Status   01/10/2018 11.2 6.0 - 12.0 fL Final     Platelets   Date Value Ref Range Status   01/10/2018 258 130 - 400 10*3/mm3 Final     Neutrophil %   Date Value Ref Range Status   03/04/2017 53.6 39.0 - 78.0 % Final     Neutrophil Rel %   Date Value Ref Range Status   05/31/2017 66.5 39.0 - 78.0 % Final     Lymphocyte %   Date Value Ref Range Status   03/04/2017 38.3 15.0 - 45.0 % Final     Lymphocyte Rel %   Date Value Ref Range Status   05/31/2017 26.2 15.0 - 45.0 % Final     Monocyte %   Date Value Ref Range Status   03/04/2017 5.7 4.0 - 12.0 % Final     Monocyte Rel %   Date Value Ref Range Status   05/31/2017 5.8 4.0 - 12.0 %  Final     Eosinophil %   Date Value Ref Range Status   03/04/2017 1.9 0.0 - 4.0 % Final     Eosinophil Rel %   Date Value Ref Range Status   05/31/2017 0.9 0.0 - 4.0 % Final     Basophil %   Date Value Ref Range Status   03/04/2017 0.2 0.0 - 2.0 % Final     Basophil Rel %   Date Value Ref Range Status   05/31/2017 0.3 0.0 - 2.0 % Final     Immature Grans %   Date Value Ref Range Status   03/04/2017 0.3 0.0 - 5.0 % Final     Neutrophils, Absolute   Date Value Ref Range Status   03/04/2017 5.04 1.87 - 8.40 10*3/mm3 Final     Neutrophils Absolute   Date Value Ref Range Status   05/31/2017 5.30 1.87 - 8.40 10*3/mm3 Final     Lymphocytes, Absolute   Date Value Ref Range Status   03/04/2017 3.61 0.72 - 4.86 10*3/mm3 Final     Lymphocytes Absolute   Date Value Ref Range Status   05/31/2017 2.08 0.72 - 4.86 10*3/mm3 Final     Monocytes, Absolute   Date Value Ref Range Status   03/04/2017 0.54 0.19 - 1.30 10*3/mm3 Final     Monocytes Absolute   Date Value Ref Range Status   05/31/2017 0.46 0.19 - 1.30 10*3/mm3 Final     Eosinophils, Absolute   Date Value Ref Range Status   03/04/2017 0.18 0.00 - 0.70 10*3/mm3 Final     Eosinophils Absolute   Date Value Ref Range Status   05/31/2017 0.07 0.00 - 0.70 10*3/mm3 Final     Basophils, Absolute   Date Value Ref Range Status   03/04/2017 0.02 0.00 - 0.20 10*3/mm3 Final     Basophils Absolute   Date Value Ref Range Status   05/31/2017 0.02 0.00 - 0.20 10*3/mm3 Final     Immature Grans, Absolute   Date Value Ref Range Status   03/04/2017 0.03 0.00 - 0.03 10*3/mm3 Final     r and b of spinal explained to pt including back pain, nerve damage, bleeding, infection, PDPH and poss GA.  Pt wishes to proceed.           Anesthesia Plan    ASA 3     spinal     intravenous induction   Anesthetic plan and risks discussed with patient.

## 2018-01-10 NOTE — ANESTHESIA PROCEDURE NOTES
Spinal Block    Patient location during procedure: OB  Indication:at surgeon's request  Performed By  CRNA: YAHAIRA GARY  Preanesthetic Checklist  Completed: patient identified, site marked, surgical consent, pre-op evaluation, timeout performed, IV checked, risks and benefits discussed and monitors and equipment checked  Spinal Block Prep:  Patient Position:sitting  Sterile Tech:cap, gloves, mask and sterile barriers  Prep:Chloraprep  Patient Monitoring:blood pressure monitoring, continuous pulse oximetry and EKG  Spinal Block Procedure  Approach:midline  Guidance:landmark technique  Location:L4-L5  Needle Type:Pencan  Needle Gauge:22 G  Placement of Spinal needle event:cerebrospinal fluid aspirated  Paresthesia: no  Fluid Appearance:clear  Post Assessment  Patient Tolerance:patient tolerated the procedure well with no apparent complications  Complications no  Additional Notes  2 attempts neg heme or paresthesia

## 2018-01-10 NOTE — H&P
[facility]  Jaquelin Rivera  : 1990  MRN: [id number]  CSN: [id number]    History and Physical    Subjective   Jaquelin Rivera is a 27 y.o. year old  with an Estimated Date of Delivery: 1/10/18 scheduled on 1-10-18 for  delivery due to elective - patient's request.  She has a known anterior.  She is not planning for sterilization at the time of the .    Prenatal care has been with Dr. Osorio.  It has been complicated by poorly controlled trigeminal neuralgia.    Obstetric History       T0      L0     SAB0   TAB0   Ectopic0   Multiple0   Live Births0       # Outcome Date GA Lbr Beny/2nd Weight Sex Delivery Anes PTL Lv   1 Current                   Past Medical History:   Diagnosis Date   • Contraception    • Dyspareunia    • Hypertension    • Insulin resistance    • IUD check up    • Ovarian cyst    • PCOS (polycystic ovarian syndrome)    • Trigeminal neuralgia    • Vitamin D deficiency        Past Surgical History:   Procedure Laterality Date   • TONSILLECTOMY AND ADENOIDECTOMY      hemorrhaged with surgical repair 3 days after surgery   • WISDOM TOOTH EXTRACTION           Current Outpatient Prescriptions:   •  acetaminophen (TYLENOL) 500 MG tablet, Take 500 mg by mouth Every 6 (Six) Hours As Needed for Mild Pain ., Disp: , Rfl:   •  gabapentin (NEURONTIN) 800 MG tablet, Take 800 mg by mouth 3 (Three) Times a Day., Disp: , Rfl:   •  HYDROcodone-acetaminophen (NORCO)  MG per tablet, Take 1 tablet by mouth Every 8 (Eight) Hours As Needed for Moderate Pain ., Disp: 10 tablet, Rfl: 0  •  Prenatal Vit-DSS-Fe Fum-FA (PNV FE FUM/DOCUSATE/FOLIC ACID) 29-1 MG tablet, Take 1 tablet by mouth., Disp: , Rfl:   No current facility-administered medications for this visit.     Allergies   Allergen Reactions   • Ibuprofen      MAKES THROAT BURN       Family History   Problem Relation Age of Onset   • No Known Problems Mother    • No Known Problems Father    • No Known Problems  Maternal Grandmother    • No Known Problems Maternal Grandfather    • Leukemia Paternal Grandmother    • No Known Problems Paternal Grandfather        Social History     Social History   • Marital status:      Spouse name: N/A   • Number of children: N/A   • Years of education: N/A     Occupational History   • Not on file.     Social History Main Topics   • Smoking status: Never Smoker   • Smokeless tobacco: Never Used   • Alcohol use Yes      Comment: occasional   • Drug use: No   • Sexual activity: Yes     Partners: Male     Birth control/ protection: None     Other Topics Concern   • Not on file     Social History Narrative       Review of Systems        Objective   /92  Wt 114 kg (252 lb)  LMP 2017 (Approximate)  BMI 41.93 kg/m2  General: well developed; well nourished  no acute distress   Heart: regular rate and rhythm, S1, S2 normal, no murmur, click, rub or gallop   Lungs: breathing is unlabored   Abdomen: soft, non-tender; bowel sounds normal; no masses, no organomegaly     Prenatal Labs  Lab Results   Component Value Date    HGB 12.3 10/11/2017    HEPBSAG Negative 2017    ABO A 2017    RH Positive 2017    ABSCRN Negative 2017    [id number] Non Reactive 2017    URINECX Final report 2017       Recent Labs  Lab Results   Component Value Date    HGB 12.3 10/11/2017    HCT 41.1 2017    WBC 7.95 2017     2017           Assessment   1. IUP with an Estimated Date of Delivery: 1/10/18  2. Planned  section on 1-10-18 for elective - patient's request     Plan   1. Primary     Soham Osorio MD  1/10/2018

## 2018-01-10 NOTE — ED PROVIDER NOTES
Subjective patient is a 27-year-old female who presents to the ER with right face pain.  Patient has a history of trigeminal neuralgia and she has battled this for years.  Patient is currently 40 weeks pregnant, .  Patient states she had to stop her normal medications when she found out she was pregnant and has been having issues with trigeminal neuralgia flares for the last 4 months.  Patient states that her pain to the right face became worse around 12 PM.  Patient states the pain as sharp and burning in nature and identical to her previous trigeminal neuralgia.  She denies any trauma.  Patient has taken Neurontin, Tylenol and Lortab with no improvement.  Patient denies any fever, chest pain, shortness of air, abdominal pain, nausea vomiting diarrhea, urinary changes, vaginal bleeding, loss of fluid or contractions.    History provided by:  Patient   used: No        Review of Systems   Constitutional: Negative.    HENT: Negative.    Eyes: Negative.    Respiratory: Negative.    Cardiovascular: Negative.    Gastrointestinal: Negative.    Endocrine: Negative.    Genitourinary: Negative.    Musculoskeletal: Positive for myalgias.   Skin: Negative.    Allergic/Immunologic: Negative.    Neurological: Negative.    Hematological: Negative.    Psychiatric/Behavioral: Negative.    All other systems reviewed and are negative.      Past Medical History:   Diagnosis Date   • Contraception    • Dyspareunia    • Hypertension    • Insulin resistance    • IUD check up    • Ovarian cyst    • PCOS (polycystic ovarian syndrome)    • Trigeminal neuralgia    • Vitamin D deficiency        Allergies   Allergen Reactions   • Ibuprofen      MAKES THROAT BURN       Past Surgical History:   Procedure Laterality Date   • TONSILLECTOMY AND ADENOIDECTOMY      hemorrhaged with surgical repair 3 days after surgery   • WISDOM TOOTH EXTRACTION         Family History   Problem Relation Age of Onset   • No Known Problems  Mother    • No Known Problems Father    • No Known Problems Maternal Grandmother    • No Known Problems Maternal Grandfather    • Leukemia Paternal Grandmother    • No Known Problems Paternal Grandfather        Social History     Social History   • Marital status:      Spouse name: N/A   • Number of children: N/A   • Years of education: N/A     Social History Main Topics   • Smoking status: Never Smoker   • Smokeless tobacco: Never Used   • Alcohol use Yes      Comment: occasional   • Drug use: No   • Sexual activity: Yes     Partners: Male     Birth control/ protection: None     Other Topics Concern   • None     Social History Narrative           Objective   Physical Exam   Constitutional: She is oriented to person, place, and time. She appears well-developed and well-nourished.   HENT:   Head: Normocephalic and atraumatic.   No facial swelling, NTTP   Eyes: Conjunctivae are normal. Pupils are equal, round, and reactive to light.   Neck: Normal range of motion.   Cardiovascular: Normal rate, regular rhythm and normal heart sounds.    Pulmonary/Chest: Effort normal and breath sounds normal.   Abdominal: Soft. There is no tenderness. There is no rigidity, no rebound, no guarding and no CVA tenderness.   Musculoskeletal: Normal range of motion. She exhibits no edema or deformity.   Neurological: She is alert and oriented to person, place, and time. She has normal strength. No cranial nerve deficit or sensory deficit.   Skin: Skin is warm.   Psychiatric: She has a normal mood and affect. Her behavior is normal.   Nursing note and vitals reviewed.      Procedures         ED Course  ED Course      Discussed the case with Dr. Osorio.  He is fine with the patient having a dose of morphine or Dilaudid.  Patient was given 4 mg of morphine IV.  Baby was monitored and Was fine.  Pain persisted after morphine.  Patient was given a dose of Dilaudid and pain improved.  Patient will be discharged home to continue her current  pain medications to follow up with her OB/GYN Dr. Osorio for further treatment.  Return for any worsening or new pain, fever or other concerns.            MDM  Number of Diagnoses or Management Options      Final diagnoses:   Trigeminal neuralgia of right side of face            Erika Winston MD  01/10/18 0625

## 2018-01-10 NOTE — PROGRESS NOTES
Has had recent flare in her TN requiring pain meds in the ER this morning with some reduction in pain but patient now concerned with facial straining with pushing.  Desires elective  section.    Risks, benefits, and alternatives to  section were discussed with the patient at  length.  The surgical nature of  section was discussed.  The indications for  section were discussed.  Risks of bleeding, infection, and damage to surrounding organs were reviewed.  Injury to blood vessels, the urinary bladder, the ureter, and the intestines were all reviewed.  Management of these complications were reviewed.    All of the patient's questions were answered to her satisfaction.  She verbalized understanding.  She wished to proceed.

## 2018-01-11 LAB
BASOPHILS # BLD AUTO: 0.02 10*3/MM3 (ref 0–0.2)
BASOPHILS NFR BLD AUTO: 0.1 % (ref 0–2)
DEPRECATED RDW RBC AUTO: 38.6 FL (ref 40–54)
EOSINOPHIL # BLD AUTO: 0.01 10*3/MM3 (ref 0–0.7)
EOSINOPHIL NFR BLD AUTO: 0.1 % (ref 0–4)
ERYTHROCYTE [DISTWIDTH] IN BLOOD BY AUTOMATED COUNT: 13.9 % (ref 12–15)
HCT VFR BLD AUTO: 33.5 % (ref 37–47)
HGB BLD-MCNC: 11.6 G/DL (ref 12–16)
IMM GRANULOCYTES # BLD: 0.06 10*3/MM3 (ref 0–0.03)
IMM GRANULOCYTES NFR BLD: 0.4 % (ref 0–5)
LYMPHOCYTES # BLD AUTO: 2.18 10*3/MM3 (ref 0.72–4.86)
LYMPHOCYTES NFR BLD AUTO: 14.9 % (ref 15–45)
MCH RBC QN AUTO: 26.9 PG (ref 28–32)
MCHC RBC AUTO-ENTMCNC: 34.6 G/DL (ref 33–36)
MCV RBC AUTO: 77.7 FL (ref 82–98)
MONOCYTES # BLD AUTO: 0.88 10*3/MM3 (ref 0.19–1.3)
MONOCYTES NFR BLD AUTO: 6 % (ref 4–12)
NEUTROPHILS # BLD AUTO: 11.53 10*3/MM3 (ref 1.87–8.4)
NEUTROPHILS NFR BLD AUTO: 78.5 % (ref 39–78)
NRBC BLD MANUAL-RTO: 0 /100 WBC (ref 0–0)
PLATELET # BLD AUTO: 228 10*3/MM3 (ref 130–400)
PMV BLD AUTO: 11.2 FL (ref 6–12)
RBC # BLD AUTO: 4.31 10*6/MM3 (ref 4.2–5.4)
WBC NRBC COR # BLD: 14.68 10*3/MM3 (ref 4.8–10.8)

## 2018-01-11 PROCEDURE — 85025 COMPLETE CBC W/AUTO DIFF WBC: CPT | Performed by: OBSTETRICS & GYNECOLOGY

## 2018-01-11 PROCEDURE — 94799 UNLISTED PULMONARY SVC/PX: CPT

## 2018-01-11 PROCEDURE — 99254 IP/OBS CNSLTJ NEW/EST MOD 60: CPT | Performed by: PSYCHIATRY & NEUROLOGY

## 2018-01-11 RX ORDER — OXCARBAZEPINE 150 MG/1
75 TABLET, FILM COATED ORAL EVERY 12 HOURS SCHEDULED
Status: DISCONTINUED | OUTPATIENT
Start: 2018-01-11 | End: 2018-01-13 | Stop reason: HOSPADM

## 2018-01-11 RX ORDER — GABAPENTIN 100 MG/1
100 CAPSULE ORAL EVERY 8 HOURS SCHEDULED
Status: DISCONTINUED | OUTPATIENT
Start: 2018-01-11 | End: 2018-01-11

## 2018-01-11 RX ORDER — OXCARBAZEPINE 150 MG/1
150 TABLET, FILM COATED ORAL EVERY 12 HOURS SCHEDULED
Status: DISCONTINUED | OUTPATIENT
Start: 2018-01-11 | End: 2018-01-11

## 2018-01-11 RX ORDER — GABAPENTIN 300 MG/1
600 CAPSULE ORAL EVERY 8 HOURS SCHEDULED
Status: DISCONTINUED | OUTPATIENT
Start: 2018-01-11 | End: 2018-01-13 | Stop reason: HOSPADM

## 2018-01-11 RX ORDER — GABAPENTIN 100 MG/1
100 CAPSULE ORAL EVERY 12 HOURS SCHEDULED
Status: DISCONTINUED | OUTPATIENT
Start: 2018-01-11 | End: 2018-01-11

## 2018-01-11 RX ORDER — OXCARBAZEPINE 150 MG/1
75 TABLET, FILM COATED ORAL DAILY PRN
Status: DISCONTINUED | OUTPATIENT
Start: 2018-01-11 | End: 2018-01-13 | Stop reason: HOSPADM

## 2018-01-11 RX ADMIN — OXYCODONE HYDROCHLORIDE AND ACETAMINOPHEN 2 TABLET: 7.5; 325 TABLET ORAL at 04:50

## 2018-01-11 RX ADMIN — GABAPENTIN 600 MG: 300 CAPSULE ORAL at 23:15

## 2018-01-11 RX ADMIN — PRENATAL VIT W/ FE FUMARATE-FA TAB 27-0.8 MG 1 TABLET: 27-0.8 TAB at 10:36

## 2018-01-11 RX ADMIN — SODIUM CHLORIDE, POTASSIUM CHLORIDE, SODIUM LACTATE AND CALCIUM CHLORIDE 125 ML/HR: 600; 310; 30; 20 INJECTION, SOLUTION INTRAVENOUS at 01:57

## 2018-01-11 RX ADMIN — OXYCODONE HYDROCHLORIDE AND ACETAMINOPHEN 1 TABLET: 7.5; 325 TABLET ORAL at 10:36

## 2018-01-11 RX ADMIN — OXYCODONE HYDROCHLORIDE AND ACETAMINOPHEN 1 TABLET: 7.5; 325 TABLET ORAL at 14:49

## 2018-01-11 RX ADMIN — OXYCODONE HYDROCHLORIDE AND ACETAMINOPHEN 2 TABLET: 7.5; 325 TABLET ORAL at 18:22

## 2018-01-11 RX ADMIN — OXCARBAZEPINE 75 MG: 150 TABLET ORAL at 20:05

## 2018-01-11 NOTE — PLAN OF CARE
Problem: Patient Care Overview (Adult)  Goal: Plan of Care Review  Outcome: Ongoing (interventions implemented as appropriate)   18   Coping/Psychosocial Response Interventions   Plan Of Care Reviewed With patient   Patient Care Overview   Progress improving   Outcome Evaluation   Outcome Summary/Follow up Plan fundus firm ml u2, lochia light, low transverse incision with pressure drsg, dry and intact. binder in place. dorado with clear/ yellow urine draining. c/o of jaw pain but pain at level of tolerance at this time, abd. pain at 3/10. bonding with infant      Goal: Adult Individualization and Mutuality  Outcome: Ongoing (interventions implemented as appropriate)   18   Individualization   Patient Specific Preferences bottle feeding   Patient Specific Goals pain level to be at tolerable level   Patient Specific Interventions fela pads changed,dorado care, vitals signs     Goal: Discharge Needs Assessment  Outcome: Ongoing (interventions implemented as appropriate)   18   Discharge Needs Assessment   Concerns To Be Addressed no discharge needs identified   Readmission Within The Last 30 Days no previous admission in last 30 days   Equipment Needed After Discharge none   Discharge Disposition home or self-care;still a patient   Current Health   Anticipated Changes Related to Illness none   Self-Care   Equipment Currently Used at Home none   Living Environment   Transportation Available car;family or friend will provide       Problem: Postpartum ( Delivery) (Adult)  Goal: Signs and Symptoms of Listed Potential Problems Will be Absent or Manageable (Postpartum)  Outcome: Ongoing (interventions implemented as appropriate)   18   Postpartum ( Delivery)   Problems Assessed (Postpartum ) all   Problems Present (Postpartum ) pain

## 2018-01-11 NOTE — ANESTHESIA POSTPROCEDURE EVALUATION
Patient: Jaquelin Rivera    Procedure Summary     Date Anesthesia Start Anesthesia Stop Room / Location    01/10/18 1705 1810  [facility] /  [facility]       Procedure Diagnosis Surgeon Provider     SECTION PRIMARY (N/A Abdomen) No diagnosis on file. MD Jermain Hoffman CRNA          Anesthesia Type: spinal  Last vitals  BP   102/72 (18 0810)   Temp   98.9 °F (37.2 °C) (18)   Pulse   68 (18)   Resp   18 (18)     SpO2   96 % (18)     Post Anesthesia Care and Evaluation    Patient location during evaluation: floor  Patient participation: complete - patient participated  Level of consciousness: awake  Pain score: 0  Pain management: adequate  Anesthetic complications: No anesthetic complications  PONV Status: none  Cardiovascular status: acceptable  Respiratory status: acceptable  Hydration status: acceptable  Post Neuraxial Block status: Motor and sensory function returned to baseline and No signs or symptoms of PDPH

## 2018-01-11 NOTE — OP NOTE
Dann Rivera  : 1990  MRN: [id number]  [facility]: [id number]  Date:   1/10/2018       Section Operative Not    Pre-Operative Dx:   1. IUP at 40w0d weeks   2. elective - patient's request      Postoperative dx:    1. Same as above       Procedure: Primary  (LTCS)       Surgeon: Soham Osorio MD           Anesthesia:          EBL: 1000 mls.   IV Fluids: 1000 mls.   UOP: 200 clear mls.     Antibiotics: cefazolin 2 gms     Infant:           Gender: female  infant    Weight: No birth weight on file.     Apgars:    @ 1 minute /    @ 5 minutes    Cord gases: Venous:  @BABYNOHDR(BRIEFLAB, PHCVEN, BECVEN)@     Arterial:  @BABYNOHDR(BRIEFLAB, PHCART, BECART)@     Procedure Details:   The patient was taken to the OR where she was prepped and draped in the usual sterile fashion in the dorsal supine position with a leftward lateral tilt.  A Pfannenstiel incision was created sharply with the knife. That incision was carried through the underlying layers of fascia sharply.  The fascia was incised in the midline with the scalpel and this incision further developed using the fascial rip technique. The fascia was freed from its midline insertion superiorly and inferiorly. Rectus muscles were  in the midline. The peritoneum was entered and expanded bluntly, and the peritoneal window further developed using blunt stretching.  A bladder flap was created sharply. The lower uterine segment was incised in a transverse fashion with the scalpel, and the uterine incision further developed with blunt stretching. Clear amniotic fluid was noted. The infant's head was delivered atraumatically. The mouth and nose were bulb suctioned, while the cord was being clamped and cut.  The infant was then handed off to waiting NICU staff.  The placenta was allowed to deliver spontaneously. The uterus was exteriorized and cleared of clot and debris with moist laparotomy sponges. The uterine incision was  closed with #0 monocryl in a continuous running locked fashion.  A second layer of #0 monocryl in a running fashion was used to imbricate the first.   the uterine incision was hemostatic.  The bladder flap was not reapproximated.  Luis Antonio was placed along hysterotomy site for added hemostatic purposes.    The uterus was returned to the abdominal cavity. The paracolic gutters were cleared of clot and debris with moist laparotomy sponges. The fascia was reapproximated  with #0 vicryl in a running fashion.  Subcutaneous tissue was closed with interrupted 0 vicryl.  Skin was closed with subcuticular absorbable staples. All counts were correct X2.         Complications:   None      Disposition:   Mother to Mother Baby/Postpartum  in stable condition currently.   Baby to remains with mom  in stable condition currently.       Soham Osorio MD  1/10/2018  6:07 PM

## 2018-01-11 NOTE — PROGRESS NOTES
LUCIE [facility]   PROGRESS NOTE    Post-Op Day 1 S/P   Subjective     Patient reports:  Pain is well controlled with ibuprofen (OTC) and narcotic analgesics including oxycodone/acetaminophen (Percocet, Tylox).  She is ambulating. Tolerating diet. Voiding - without difficulty; flatus reported..  Vaginal bleeding is as much as expected.      Objective      Vitals: Vital Signs Range for the last 24 hours  Temperature: Temp:  [97.7 °F (36.5 °C)-99 °F (37.2 °C)] 99 °F (37.2 °C)   Temp Source: Temp src: Temporal Artery    BP: BP: ()/(51-92) 125/60   Pulse: Heart Rate:  [58-95] 75   Respirations: Resp:  [16-20] 16   SPO2: SpO2:  [93 %-98 %] 97 %   O2 Amount (l/min):     O2 Devices O2 Device: room air   Weight: Weight:  [114 kg (252 lb)] 114 kg (252 lb)            Physical Exam    Lungs clear to auscultation bilaterally   Abdomen Soft, approp tender   Incision  Dressing c/d/i   Extremities edema 1+ and Homans sign is negative, no sign of DVT     I reviewed the patient's new clinical results.  Lab Results (last 24 hours)     Procedure Component Value Units Date/Time    CBC (No Diff) [[id number]]  (Abnormal) Collected:  01/10/18 1449    Specimen:  Blood Updated:  01/10/18 1501     WBC 11.61 (H) 10*3/mm3      RBC 5.07 10*6/mm3      Hemoglobin 13.8 g/dL      Hematocrit 39.4 %      MCV 77.7 (L) fL      MCH 27.2 (L) pg      MCHC 35.0 g/dL      RDW 14.2 %      RDW-SD 38.7 (L) fl      MPV 11.2 fL      Platelets 258 10*3/mm3     Chlamydia trachomatis, Neisseria gonorrhoeae, PCR w/ confirmation - Swab, Vagina [[id number]] Resulted:  17     Specimen:  Swab from Vagina Updated:  01/10/18 1513     External Chlamydia Screen Negative    Gonorrhea Screen - Swab, [[id number]] Resulted:  17     Specimen:  Swab Updated:  01/10/18 1513     External Gonorrhea Screen Negative    Hepatitis B Surface Antigen [[id number]] Resulted:  17     Specimen:  Blood Updated:  01/10/18 1513     External Hepatitis B Surface Ag  Negative    RPR [[id number]] Resulted:  05/31/17     Specimen:  Blood Updated:  01/10/18 1513     External RPR Non-Reactive    Rubella Antibody, IgG [[id number]] Resulted:  05/31/17     Specimen:  Blood Updated:  01/10/18 1513     External Rubella Qual Immune    HIV-1 Antibody, EIA [[id number]] Resulted:  05/31/17     Specimen:  Blood Updated:  01/10/18 1513     External HIV-1 Antibody Negative    Group B Streptococcus Culture - Swab, Vaginal/Rectum [[id number]] Resulted:  12/15/17     Specimen:  Swab from Vaginal/Rectum Updated:  01/10/18 1516     External Strep Group B Ag NEG    CBC & Differential [[id number]] Collected:  01/11/18 0641    Specimen:  Blood Updated:  01/11/18 0706    Narrative:       The following orders were created for panel order CBC & Differential.  Procedure                               Abnormality         Status                     ---------                               -----------         ------                     CBC Auto Differential[[id number]]        Abnormal            Final result                 Please view results for these tests on the individual orders.    CBC Auto Differential [[id number]]  (Abnormal) Collected:  01/11/18 0641    Specimen:  Blood Updated:  01/11/18 0706     WBC 14.68 (H) 10*3/mm3      RBC 4.31 10*6/mm3      Hemoglobin 11.6 (L) g/dL      Hematocrit 33.5 (L) %      MCV 77.7 (L) fL      MCH 26.9 (L) pg      MCHC 34.6 g/dL      RDW 13.9 %      RDW-SD 38.6 (L) fl      MPV 11.2 fL      Platelets 228 10*3/mm3      Neutrophil % 78.5 (H) %      Lymphocyte % 14.9 (L) %      Monocyte % 6.0 %      Eosinophil % 0.1 %      Basophil % 0.1 %      Immature Grans % 0.4 %      Neutrophils, Absolute 11.53 (H) 10*3/mm3      Lymphocytes, Absolute 2.18 10*3/mm3      Monocytes, Absolute 0.88 10*3/mm3      Eosinophils, Absolute 0.01 10*3/mm3      Basophils, Absolute 0.02 10*3/mm3      Immature Grans, Absolute 0.06 (H) 10*3/mm3      nRBC 0.0 /100 WBC           Assessment/Plan      Active  Problems:    Term pregnancy      Assessment:    Jaquelin Rivera is Day 1  post-partum  , Low Transverse    .       Plan:  remove dressing, saline lock IV fluids, advance diet  normal diet as tolerated and continue post op care.        Navi Copeland MD  2018  7:41 AM

## 2018-01-11 NOTE — CONSULTS
Neurology Consult Note    Patient:  Jaquelin Rivera   YOB: 1990  MRN:  [id number]  Date of Admission:  1/10/2018  2:03 PM    Date: 1/11/2018    Referring Provider: Soham Osorio MD  Reason for Consultation: trigeminal neuralgia      History of present illness:     This is a 27 y.o. year old right handed female.with H/O PCOS,  who underwent C section on 01/10/2018 and is evaluated for treatment of her known right trigeminal neuralgia.  She previously had discontinued all meds that treated this when she became pregnant.  However because of the return of her symptoms she was started back on gabapentin once gestational  of 22 weeks achieved.  She did eventually obtain a dose of 800 mg 3 times a day.  This did not effectively abort her symptoms.  However it was of some help.     Patient is followed by Dr Jasmine at [facility] for right trigeminal neuralgia and in past failed gabapentin and tegretol and in the past also has tried baclofen as well as Trileptal and amitriptyline. She ultimately required gamma knife radiosurgery in March 2015 which was performed at [place]. This improved symptoms considerably but then she required Trileptal and Gralise but due to cost she went back to Trileptal and gabapentin and amitriptyline.     When she discontinued all her meds because of her pregnancy she then began noticing  recurrent right V3 territory jabbing/ stabbing symptoms in June 2017 about 2 months after discontinuing her medications.  As time went on, her trigeminal neuralgia involved all 3 divisions where it was more stabbing and retro-orbital pain in the V1 and part of V2 distribution and more of a burning dysesthesias V3. She also describes allodynia in the right V1 distribution.  The pain has become more intense and more often of.  She notes that rushing her hair brushing her teeth and washing, wind blowing on her face or cold may set this off.     She denies increased lacrimation, nasal  stuffiness, sclera injection, miosis, etc.     Now that she delivered she would like to be placed back on her meds.     Past Medical History:   Diagnosis Date   • Contraception    • Dyspareunia    • Hypertension    • Insulin resistance    • IUD check up    • Ovarian cyst    • PCOS (polycystic ovarian syndrome)    • Trigeminal neuralgia    • Vitamin D deficiency        Past Surgical History:   Procedure Laterality Date   •  SECTION N/A 1/10/2018    Procedure:  SECTION PRIMARY;  Surgeon: Soham Osorio MD;  Location: [facility] LABOR DELIVERY;  Service:    • TONSILLECTOMY AND ADENOIDECTOMY      hemorrhaged with surgical repair 3 days after surgery   • WISDOM TOOTH EXTRACTION         Prior to Admission medications    Medication Sig Start Date End Date Taking? Authorizing Provider   acetaminophen (TYLENOL) 500 MG tablet Take 500 mg by mouth Every 6 (Six) Hours As Needed for Mild Pain .    Historical Provider, MD   gabapentin (NEURONTIN) 800 MG tablet Take 800 mg by mouth 3 (Three) Times a Day. 10/26/17   Historical Provider, MD   HYDROcodone-acetaminophen (NORCO)  MG per tablet Take 1 tablet by mouth Every 8 (Eight) Hours As Needed for Moderate Pain . 17   Navi Copeland MD   Prenatal Vit-DSS-Fe Fum-FA (PNV FE FUM/DOCUSATE/FOLIC ACID) 29-1 MG tablet Take 1 tablet by mouth.    Historical Provider, MD       Hospital scheduled medications:     prenatal vitamin 27-0.8 1 tablet Oral Daily     Hospital PRN medications:  •  butorphanol  •  docusate sodium  •  HYDROmorphone **AND** naloxone  •  nalbuphine  •  naloxone  •  ondansetron  •  ondansetron  •  oxyCODONE-acetaminophen  •  oxyCODONE-acetaminophen  •  oxyCODONE-acetaminophen  •  oxyCODONE-acetaminophen  •  simethicone    Allergies   Allergen Reactions   • Ibuprofen      MAKES THROAT BURN       Social History     Social History   • Marital status:      Spouse name: N/A   • Number of children: N/A   • Years of education: N/A      Occupational History   • Not on file.     Social History Main Topics   • Smoking status: Never Smoker   • Smokeless tobacco: Never Used   • Alcohol use Yes      Comment: occasional   • Drug use: No   • Sexual activity: Yes     Partners: Male     Birth control/ protection: None     Other Topics Concern   • Not on file     Social History Narrative     Family History   Problem Relation Age of Onset   • No Known Problems Mother    • No Known Problems Father    • No Known Problems Maternal Grandmother    • No Known Problems Maternal Grandfather    • Leukemia Paternal Grandmother    • No Known Problems Paternal Grandfather        Review of Systems  A 14 point review of systems was reviewed and was negative except for facial pain    Vital Signs   Temp:  [97.7 °F (36.5 °C)-99 °F (37.2 °C)] 97.7 °F (36.5 °C)  Heart Rate:  [58-95] 82  Resp:  [15-20] 18  BP: ()/(51-83) 138/83    General Exam:  Head:  Normal cephalic, atraumatic  HEENT:  Neck supple  Fundoscopic Exam:  No signs of disc edema  CVS:  Regular rate and rhythm.  No murmurs  Carotid Examination:  No bruits  Lungs:  Clear to auscultation  Abdomen:  Non-tender, Non-distended  Extremities:  No signs of peripheral edema  Skin:  No rashes    Neurologic Exam:    Mental Status:    -Awake, Alert, Oriented X 3  -No word finding difficulties  -No aphasia  -No dysarthria  -Follows simple and complex commands    CN II:  Visual fields full.  Pupils equally reactive to light  CN III, IV, VI:  Extraocular Muscles full with no signs of nystagmus  CN V:  Facial sensory was not evaluated at this time due to concern that this would set off her trigeminal neuralgia   CN VII:  Facial motor symmetric  CN VIII:  Gross hearing intact bilaterally  CN IX/X:  Palate elevates symmetrically  CN XI:  Shoulder shrug symmetric  CN XII:  Tongue is midline on protrusion    Motor: She moves all 4 extremities against gravity  DTR:  -Right   Bicep: 2+ Triceps: 2+ Brachioradialis: 2+   Patella:  2+ Ankle: 2+ Neg Babinski  -Left   Bicep: 2+ Triceps: 2+ Brachioradialis: 2+   Patella: 2+ Ankle: 2+ Neg Babinski    Sensory:  -Intact to light touch  Coordination:  -Finger to nose intact  -Heel to shin intact  -No ataxia          Results Review:  Lab Results (last 7 days)     Procedure Component Value Units Date/Time    CBC (No Diff) [[id number]]  (Abnormal) Collected:  01/10/18 1449    Specimen:  Blood Updated:  01/10/18 1501     WBC 11.61 (H) 10*3/mm3      RBC 5.07 10*6/mm3      Hemoglobin 13.8 g/dL      Hematocrit 39.4 %      MCV 77.7 (L) fL      MCH 27.2 (L) pg      MCHC 35.0 g/dL      RDW 14.2 %      RDW-SD 38.7 (L) fl      MPV 11.2 fL      Platelets 258 10*3/mm3     Chlamydia trachomatis, Neisseria gonorrhoeae, PCR w/ confirmation - Swab, Vagina [[id number]] Resulted:  05/31/17     Specimen:  Swab from Vagina Updated:  01/10/18 1513     External Chlamydia Screen Negative    Gonorrhea Screen - Swab, [[id number]] Resulted:  05/31/17     Specimen:  Swab Updated:  01/10/18 1513     External Gonorrhea Screen Negative    Hepatitis B Surface Antigen [[id number]] Resulted:  05/31/17     Specimen:  Blood Updated:  01/10/18 1513     External Hepatitis B Surface Ag Negative    RPR [[id number]] Resulted:  05/31/17     Specimen:  Blood Updated:  01/10/18 1513     External RPR Non-Reactive    Rubella Antibody, IgG [[id number]] Resulted:  05/31/17     Specimen:  Blood Updated:  01/10/18 1513     External Rubella Qual Immune    HIV-1 Antibody, EIA [[id number]] Resulted:  05/31/17     Specimen:  Blood Updated:  01/10/18 1513     External HIV-1 Antibody Negative    Group B Streptococcus Culture - Swab, Vaginal/Rectum [[id number]] Resulted:  12/15/17     Specimen:  Swab from Vaginal/Rectum Updated:  01/10/18 1516     External Strep Group B Ag NEG    CBC & Differential [[id number]] Collected:  01/11/18 0641    Specimen:  Blood Updated:  01/11/18 0706    Narrative:       The following orders were created for panel order CBC &  Differential.  Procedure                               Abnormality         Status                     ---------                               -----------         ------                     CBC Auto Differential[[id number]]        Abnormal            Final result                 Please view results for these tests on the individual orders.    CBC Auto Differential [[id number]]  (Abnormal) Collected:  01/11/18 0641    Specimen:  Blood Updated:  01/11/18 0706     WBC 14.68 (H) 10*3/mm3      RBC 4.31 10*6/mm3      Hemoglobin 11.6 (L) g/dL      Hematocrit 33.5 (L) %      MCV 77.7 (L) fL      MCH 26.9 (L) pg      MCHC 34.6 g/dL      RDW 13.9 %      RDW-SD 38.6 (L) fl      MPV 11.2 fL      Platelets 228 10*3/mm3      Neutrophil % 78.5 (H) %      Lymphocyte % 14.9 (L) %      Monocyte % 6.0 %      Eosinophil % 0.1 %      Basophil % 0.1 %      Immature Grans % 0.4 %      Neutrophils, Absolute 11.53 (H) 10*3/mm3      Lymphocytes, Absolute 2.18 10*3/mm3      Monocytes, Absolute 0.88 10*3/mm3      Eosinophils, Absolute 0.01 10*3/mm3      Basophils, Absolute 0.02 10*3/mm3      Immature Grans, Absolute 0.06 (H) 10*3/mm3      nRBC 0.0 /100 WBC     Tissue Pathology Exam - Tissue, Placenta [[id number]] Collected:  01/10/18 1833    Specimen:  Tissue from Placenta Updated:  01/11/18 1120          .  Imaging Results (last 24 hours)     ** No results found for the last 24 hours. **              Impression    1.  Right trigeminal neuralgia affecting all 3 branches.    Plan    1. Begin Trileptal 75 mg BID and if tolerated, tomorrow Trileptal will be increased to 150 mg BID. .  We discussed trying to increase this as rapidly as possible  but also not going up too quickly with the titration in effort to avoid potential side effects  2. Resume her gabapentin but this time at 600 mg 3 times a day as she is only discontinued this  2 days ago.  Once again she will be monitored for side effects. She believes she will be here for another 2 days of  that gives us some time to find out what she tolerates and how how fast we can titrate this.  I have also discussed with her that over the next week she can call our office or the nursing line here at the hospital who can then contact me should she needs a higher dose or has problems with the titration rate.    3. Tomorrow after we determine some tolerability, a titration schedule will be given to her.  4. We also discussed she may need to return to [place] for repeat procedure depending on the effectiveness of the medication.      I discussed the patients findings and my recommendations with patient, family and nursing staff    Kimberlyn Vizcaino MD  01/11/18  6:00 PM

## 2018-01-12 PROCEDURE — 99232 SBSQ HOSP IP/OBS MODERATE 35: CPT | Performed by: PSYCHIATRY & NEUROLOGY

## 2018-01-12 RX ORDER — OXCARBAZEPINE 150 MG/1
TABLET, FILM COATED ORAL
Qty: 120 TABLET | Refills: 1 | Status: SHIPPED | OUTPATIENT
Start: 2018-01-12 | End: 2018-05-16

## 2018-01-12 RX ORDER — GABAPENTIN 600 MG/1
600 TABLET ORAL EVERY 8 HOURS
Qty: 90 TABLET | Refills: 1 | Status: SHIPPED | OUTPATIENT
Start: 2018-01-12 | End: 2018-02-16

## 2018-01-12 RX ADMIN — GABAPENTIN 600 MG: 300 CAPSULE ORAL at 14:17

## 2018-01-12 RX ADMIN — GABAPENTIN 600 MG: 300 CAPSULE ORAL at 22:12

## 2018-01-12 RX ADMIN — DOCUSATE SODIUM 100 MG: 100 CAPSULE, LIQUID FILLED ORAL at 08:02

## 2018-01-12 RX ADMIN — OXYCODONE HYDROCHLORIDE AND ACETAMINOPHEN 1 TABLET: 7.5; 325 TABLET ORAL at 21:03

## 2018-01-12 RX ADMIN — GABAPENTIN 600 MG: 300 CAPSULE ORAL at 05:59

## 2018-01-12 RX ADMIN — PRENATAL VIT W/ FE FUMARATE-FA TAB 27-0.8 MG 1 TABLET: 27-0.8 TAB at 08:02

## 2018-01-12 RX ADMIN — OXCARBAZEPINE 75 MG: 150 TABLET ORAL at 08:03

## 2018-01-12 RX ADMIN — OXYCODONE HYDROCHLORIDE AND ACETAMINOPHEN 1 TABLET: 7.5; 325 TABLET ORAL at 12:00

## 2018-01-12 RX ADMIN — OXCARBAZEPINE 75 MG: 150 TABLET ORAL at 21:03

## 2018-01-12 NOTE — PROGRESS NOTES
[facility]   PROGRESS NOTE    Post-Op Day 2 S/P   Subjective     Patient reports:  Pain is well controlled with ibuprofen (OTC) and narcotic analgesics including oxycodone/acetaminophen (Percocet, Tylox).  She is ambulating. Tolerating diet. Voiding - without difficulty; flatus reported..  Vaginal bleeding is as much as expected.      Objective      Vitals: Vital Signs Range for the last 24 hours  Temperature: Temp:  [97.1 °F (36.2 °C)-98.7 °F (37.1 °C)] 98.3 °F (36.8 °C)   Temp Source: Temp src: Temporal Artery    BP: BP: (115-147)/(65-83) 147/78   Pulse: Heart Rate:  [] 101   Respirations: Resp:  [15-18] 16   SPO2: SpO2:  [96 %-99 %] 99 %   O2 Amount (l/min):     O2 Devices O2 Device: room air   Weight:              Physical Exam    Lungs clear to auscultation bilaterally   Abdomen Soft, approp tender   Incision  healing well, no erythema, no swelling   Extremities edema trace and Homans sign is negative, no sign of DVT     I reviewed the patient's new clinical results.  Lab Results (last 24 hours)     Procedure Component Value Units Date/Time    Tissue Pathology Exam - Tissue, Placenta [[id number]] Collected:  01/10/18 1833    Specimen:  Tissue from Placenta Updated:  18 1120          Assessment/Plan      Active Problems:    Term pregnancy      Assessment:    Jaquelin Rivera is Day 2  post-partum  , Low Transverse    .       Plan:  continue post op care.        Navi Copeland MD  2018  9:48 AM

## 2018-01-12 NOTE — PLAN OF CARE
Problem: Patient Care Overview (Adult)  Goal: Plan of Care Review  Outcome: Ongoing (interventions implemented as appropriate)   18 0309   Coping/Psychosocial Response Interventions   Plan Of Care Reviewed With patient;spouse   Patient Care Overview   Progress improving   Outcome Evaluation   Outcome Summary/Follow up Plan FF ML U2 scant lochia. ALT incision with steri strips, scant dried drainage. started back on neurontin and oxcarbazepine for trigeminal neuralgia. neurology has been here and evaluated patient, will come back today () for assessment when patient is in more stable condition.      Goal: Adult Individualization and Mutuality  Outcome: Ongoing (interventions implemented as appropriate)   18 0024 18 1849 18 0309   Individualization   Patient Specific Preferences bottle feeding --  --    Patient Specific Goals --  relief from trigeminal pain --    Patient Specific Interventions --  --  neurontin and oxcarbazepine for trigeminal neuralgia pain   Mutuality/Individual Preferences   What Anxieties, Fears or Concerns Do You Have About Your Health or Care? --  --  relief from pain    What Questions Do You Have About Your Health or Care? --  --   care questions     Goal: Discharge Needs Assessment  Outcome: Ongoing (interventions implemented as appropriate)      Problem: Postpartum ( Delivery) (Adult)  Goal: Signs and Symptoms of Listed Potential Problems Will be Absent or Manageable (Postpartum)  Outcome: Ongoing (interventions implemented as appropriate)

## 2018-01-12 NOTE — PROGRESS NOTES
"  Neurology Progress Note      Date of admission: 1/10/2018  2:03 PM  Date of visit: 1/12/2018    Chief Complaint:  Facial pain    Subjective     Subjective:    Her trigeminal neuralgia is considerably better on Trileptal 75 mg BID.  She reports she gets occasional stabs/burning in the right V3 territory and ranks it as being milder perhaps a 2 but notes it could get worse.   She was able to eat a full lunch for the first time \"in a long time--weeks.\" without this triggering the pain.   Medications:  Current Facility-Administered Medications   Medication Dose Route Frequency Provider Last Rate Last Dose   • docusate sodium (COLACE) capsule 100 mg  100 mg Oral BID PRN Soham Osorio MD   100 mg at 01/12/18 0802   • gabapentin (NEURONTIN) capsule 600 mg  600 mg Oral Q8H Kimberlyn Vizcaino MD   600 mg at 01/12/18 0559   • HYDROmorphone (DILAUDID) injection 0.5 mg  0.5 mg Intravenous Q2H PRN Soham Osorio MD        And   • naloxone (NARCAN) injection 0.1 mg  0.1 mg Intravenous Q5 Min PRN Soham Osorio MD       • lactated ringers infusion  125 mL/hr Intravenous Continuous Soham Osorio MD   Stopped at 01/11/18 0832   • ondansetron (ZOFRAN) tablet 4 mg  4 mg Oral Q8H PRN Soham Osorio MD       • OXcarbazepine (TRILEPTAL) tablet 75 mg  75 mg Oral Q12H Kimberlyn Vizcaino MD   75 mg at 01/12/18 0803   • OXcarbazepine (TRILEPTAL) tablet 75 mg  75 mg Oral Daily PRN Kimberlyn Vizcaino MD       • oxyCODONE-acetaminophen (PERCOCET)  MG per tablet 1 tablet  1 tablet Oral Q4H PRN Soham Osorio MD       • oxyCODONE-acetaminophen (PERCOCET) 7.5-325 MG per tablet 1 tablet  1 tablet Oral Q4H PRN Soham Osorio MD   1 tablet at 01/12/18 1200   • prenatal vitamin 27-0.8 tablet 1 tablet  1 tablet Oral Daily Soham Osorio MD   1 tablet at 01/12/18 0802   • simethicone (MYLICON) chewable tablet 80 mg  80 mg Oral 4x Daily PRN Soham Osorio MD           Review of Systems:   -A 14 point review of " systems is completed and is negative except for right facial pain  Objective     Objective      Vital Signs  Temp:  [97.1 °F (36.2 °C)-98.7 °F (37.1 °C)] 98.3 °F (36.8 °C)  Heart Rate:  [] 87  Resp:  [16-18] 18  BP: (128-147)/(73-83) 135/82    Physical Exam:    HEENT:  Neck supple  CVS:  Regular rate and rhythm.  No murmurs  Carotid Examination:  No bruits  Lungs:  Clear to auscultation  Abdomen:  Non-tender, Non-distended  Extremities:  No signs of peripheral edema    Neurologic Exam:    -Awake, Alert, Oriented X 3  -No word finding difficulties  -No aphasia  -No dysarthria  -Follows simple and complex commands    Cranial nerves II through XII intact.EOMI No facial weakness    Motor: Moves all extremities against gravity.    DTR:  2+ throughout in all four extremities        Coordination/Gait:  -No ataxia     Results Review:    I reviewed the patient's new clinical results.    Lab Results (last 24 hours)     ** No results found for the last 24 hours. **        Imaging Results (last 24 hours)     ** No results found for the last 24 hours. **          Assessment/Plan     Hospital Problem List    Active Problems:    Term pregnancy    Impression:  1. Right trigeminal neuralgia  Plan:  Increase Trileptal to 150 mg every 12 hours.  If she does not tolerate this it can be reduced to 75 mg every 8 hours for 3 days.  Script for Trileptal written and sent to her pharmacy.   In addition script given to patient to continue Gabapentin 600 mg every 8 hours.     Instructions at discharge for titration of oxcarbazepine  Follow titration schedule of Trileptal (oxcarbazepine). She is not to increase dose  if she does not tolerate and increase only to the effective dose: I.e she does not need to increase if a lower dose works.    Day 1 through 3 Trileptal 150 mg every 12 hours  Day 4 through 6 Trileptal 150 mg every 8 hours  Day 7 300 mg (2 tabs of 150 mg) every 12 hours.    She is to call [facility] Nurse Call Line if any  questions They can connect her to Neurology on call if needed  Follow up with Neurology.  Dr. Jasmine at [facility] is her neurologist.      Kimberlyn Vizcaino MD  01/12/18  2:17 PM

## 2018-01-12 NOTE — LACTATION NOTE
This note was copied from a baby's chart.  Reviewed milk suppression and formula feeding teaching with mother.     Suppression of Lactation for Non-Nursing Mothers handout    If you choose not to breastfeed, please let us know if you have any questions about whether yours was the right choice for you and your family.  While there are a very few conditions where breastfeeding is not recommended, most uses surrounding breastfeeding can be managed with proper support.  We are here to hep and support you no matter how you choose to feed your baby.    To suppress further lactation and prevent milk from coming in-- as much as possible:  **Wear a good fitting support bra without an under wire (sports bras are good)   **Wear bra continuously day and night for about 1-2 weeks  **Avoid any kind of breast stimulation such as rubbing, warmth or massage.  ** While showering, try to stand with your back to the water. The warm water will     encourage milk flow.  **Cold compression may be applied for 20 minutes once per hour as needed.  **Anti-Inflammatory medications such as ibuprofen (Motrin) may help.  Ue per your doctors and/or manufacture instructions.  ** If you develop a fever greater than 101 F, especially if you also have flu- like symptoms and any areas of redness or swelling in your breasts, please call your physician. You may need treatment for a condition called mastitis.      The Many Benefits if Breastfeeding   Breastfeeding saves time  *Breastfeeding allows you to calm or feed your baby immediately, which leads to a happier baby who cries less  *There is nothing to buy, prepare, or maintain.There is nothing to clean or sterilize.  Breastfeeding builds a mothers confidence  *She knows all her baby needs to thrive is her!  Breastfeeding saves Money  *There is no formula to buy and healthier breast fed babies have less medical costs  Healthy Mom/Healthy baby  * babies get sick less often, and when they do  they are usually sick less severely and for a shorter time  * babies have fewer ear infections  * babies have fewer allergies  *Mothers who breastfeed have a lower risk for cancer, osteoporosis, anemia, high blood pressure, obesity, and Type ll diabetes  *Mothers miss less work days with sick babies  Breast fed babies have a better dental health  * babies have better jaw development which requires lest orthodontic work  *Breast milk does not promote cavities  * babies can nurse at night without worry of tooth decay  Breastfeeding allows a baby to reach his full IQ potential  *The longer a baby is breast fed, the better their brain development  Breast fed babies and moms are more relaxed  *The hormones released during breastfeeding have a calming effect on mothers  *Breastfeeding requires mom to take a break; this may help mom get more rest after delivery  *Breastfeeding is quicker than preparing formula which allows mom and baby to get back to sleep faster  *Breastfeeding promotes bonding and allows mom to learn babies cues and care needs more quickly  Breastfeeding cleanup is easier  *The bowel movements and spit up of breast fed babies doesn't smell as bad  *Spit-up of breast fed babies doesn't stain clothing  Getting out of the hourse is easier  *No formula bottles to prepare and carry safely   *No time restraints due to worry about what baby will eat  *No worries about warming a bottle or finding safe water to prepare bottles  Breastfeeding mother get their bodies back sooner  *The uterus shrinks more quickly and completely, which allows a flatter tummy  *Breastfeeding burns 400-500 calories a day; making milk torches stored fat!  Breastfeeding is better for the environment  *There is no trash to dispose of after breastfeeding  *There is no production facility to produce breast milk; moms body does it all without the pollution of a factory          Safe use and Preparation of  Infant Formula Hand out adapted from: www.foodsafety.gov  Formula Feeding handout by Lactation Education Resources 2    Instructed on [facility] Lactation Office Contact information for support after discharge with suppression.

## 2018-01-12 NOTE — PLAN OF CARE
Problem: Patient Care Overview (Adult)  Goal: Plan of Care Review  Outcome: Ongoing (interventions implemented as appropriate)   18 1660   Coping/Psychosocial Response Interventions   Plan Of Care Reviewed With patient   Patient Care Overview   Progress progress towards functional goals is fair   Outcome Evaluation   Outcome Summary/Follow up Plan FF/ML/U2 with light lochia; ALT incision with steri-strips - well approximated; ambulating; voiding; most pain is from trigeminal neuralgia pain - neurology has been consulted     Goal: Adult Individualization and Mutuality  Outcome: Ongoing (interventions implemented as appropriate)    Goal: Discharge Needs Assessment  Outcome: Ongoing (interventions implemented as appropriate)      Problem: Postpartum ( Delivery) (Adult)  Goal: Signs and Symptoms of Listed Potential Problems Will be Absent or Manageable (Postpartum)  Outcome: Ongoing (interventions implemented as appropriate)

## 2018-01-13 VITALS
BODY MASS INDEX: 41.99 KG/M2 | DIASTOLIC BLOOD PRESSURE: 67 MMHG | RESPIRATION RATE: 18 BRPM | HEART RATE: 77 BPM | HEIGHT: 65 IN | TEMPERATURE: 97.8 F | WEIGHT: 252 LBS | OXYGEN SATURATION: 98 % | SYSTOLIC BLOOD PRESSURE: 119 MMHG

## 2018-01-13 RX ORDER — OXYCODONE AND ACETAMINOPHEN 7.5; 325 MG/1; MG/1
1 TABLET ORAL EVERY 4 HOURS PRN
Qty: 30 TABLET | Refills: 0 | Status: SHIPPED | OUTPATIENT
Start: 2018-01-13 | End: 2021-09-14 | Stop reason: SDUPTHER

## 2018-01-13 RX ADMIN — OXCARBAZEPINE 75 MG: 150 TABLET ORAL at 10:50

## 2018-01-13 RX ADMIN — PRENATAL VIT W/ FE FUMARATE-FA TAB 27-0.8 MG 1 TABLET: 27-0.8 TAB at 09:47

## 2018-01-13 RX ADMIN — OXCARBAZEPINE 75 MG: 150 TABLET ORAL at 01:42

## 2018-01-13 RX ADMIN — GABAPENTIN 600 MG: 300 CAPSULE ORAL at 06:10

## 2018-01-13 RX ADMIN — DOCUSATE SODIUM 100 MG: 100 CAPSULE, LIQUID FILLED ORAL at 06:10

## 2018-01-13 RX ADMIN — OXYCODONE HYDROCHLORIDE AND ACETAMINOPHEN 1 TABLET: 10; 325 TABLET ORAL at 02:18

## 2018-01-13 NOTE — PROGRESS NOTES
[facility]   PROGRESS NOTE    Post-Op Day 3 S/P   Subjective     Patient reports:  Pain is well controlled with ibuprofen (OTC) and narcotic analgesics including oxycodone/acetaminophen (Percocet, Tylox).  She is ambulating. Tolerating diet. Voiding - without difficulty; flatus reported..  Vaginal bleeding is as much as expected.      Objective      Vitals: Vital Signs Range for the last 24 hours  Temperature: Temp:  [97.8 °F (36.6 °C)-99 °F (37.2 °C)] 97.8 °F (36.6 °C)   Temp Source: Temp src: Temporal Artery    BP: BP: (119-135)/(64-93) 119/67   Pulse: Heart Rate:  [77-98] 77   Respirations: Resp:  [16-20] 18   SPO2: SpO2:  [98 %-100 %] 98 %   O2 Amount (l/min):     O2 Devices O2 Device: room air   Weight:              Physical Exam    Lungs clear to auscultation bilaterally   Abdomen Soft, approp tender   Incision  healing well, no erythema, no swelling   Extremities edema trace and Homans sign is negative, no sign of DVT     I reviewed the patient's new clinical results.  Lab Results (last 24 hours)     ** No results found for the last 24 hours. **          Assessment/Plan      Active Problems:    Term pregnancy      Assessment:    Jaquelin Rivera is Day 3  post-partum  , Low Transverse    .       Plan:  plan for discharge today.        Navi Copeland MD  2018  9:05 AM

## 2018-01-13 NOTE — DISCHARGE SUMMARY
Discharge Summary     Dann Rivera  : 1990  MRN: [id number]  CSN: [id number]    Date of Admission: 1/10/2018   Date of Discharge:  2018   Delivering Physician: Soham Osorio MD       Admission Diagnosis: 1. Term pregnancy [Z34.80]   Discharge Diagnosis: 1. Pregnancy at 40w0d - delivered       Procedures: 1. Primary  (LTCS)     Hospital Course  See the completed operative report for details regarding antepartum course and delivery.    Her post-operative course was unremarkable.  On POD # 3 she felt like she ready ready for D/C.  She was evaluated by Dr. Copeland who agreed she was able to be discharged to home.  She had no febrile morbidity. She had normal bowel and bladder function and was hemodynamically stable.  Her wound was healing well without obvious signs of infections. Neurology was consulted for treatment of her trigeminal neuralgia.    Infant  female  fetus weighing 3010 g (6 lb 10.2 oz)   Apgars -  8  @ 1 minute /  9  @ 5 minutes.    Discharge labs  Lab Results   Component Value Date    WBC 14.68 (H) 2018    HGB 11.6 (L) 2018    HCT 33.5 (L) 2018     2018       Discharge Medications   Jaquelin Rivera   Home Medication Instructions [id number]    Printed on:18 0910   Medication Information                      gabapentin (NEURONTIN) 600 MG tablet  Take 1 tablet by mouth Every 8 (Eight) Hours.             OXcarbazepine (TRILEPTAL) 150 MG tablet  Begin 150 mg 1 tab every 12 hours and increase by 150 mg (1 tab) every 3 days until on 300 mg (2 tabs) every 12 hours.             oxyCODONE-acetaminophen (PERCOCET) 7.5-325 MG per tablet  Take 1 tablet by mouth Every 4 (Four) Hours As Needed for Moderate Pain  for up to 8 days.             Prenatal Vit-DSS-Fe Fum-FA (PNV FE FUM/DOCUSATE/FOLIC ACID) 29-1 MG tablet  Take 1 tablet by mouth.                 Discharge Disposition Home or Self Care   Condition on Discharge: good    Follow-up: 1 week with Jo Copeland MD  1/13/2018

## 2018-01-13 NOTE — PLAN OF CARE
Problem: Patient Care Overview (Adult)  Goal: Plan of Care Review  Outcome: Ongoing (interventions implemented as appropriate)   18 0309 18 0800 18 1840   Coping/Psychosocial Response Interventions   Plan Of Care Reviewed With --  patient;family --    Patient Care Overview   Progress improving --  --    Outcome Evaluation   Outcome Summary/Follow up Plan --  --  AFEBRILE. //93. FUNDUS F, ML, UU. SCANT LOCHIA. BRA ON. PT HAS TAKEN PO PAIN MEDS X 1 THIS SHIFT. STATES PAIN IS BETTER TODAY, AND DISCOMFORT WITH TRIGEMINAL NEURALGIA IS BETTER TODAY. ABD INC WITH STERI STRIPS ILA. EDGES WELL APPR.      Goal: Adult Individualization and Mutuality  Outcome: Ongoing (interventions implemented as appropriate)    Goal: Discharge Needs Assessment  Outcome: Ongoing (interventions implemented as appropriate)      Problem: Postpartum ( Delivery) (Adult)  Goal: Signs and Symptoms of Listed Potential Problems Will be Absent or Manageable (Postpartum)  Outcome: Ongoing (interventions implemented as appropriate)

## 2018-01-13 NOTE — PLAN OF CARE
Problem: Patient Care Overview (Adult)  Goal: Plan of Care Review  Outcome: Ongoing (interventions implemented as appropriate)   18 0359   Coping/Psychosocial Response Interventions   Plan Of Care Reviewed With spouse;patient   Patient Care Overview   Progress improving   Outcome Evaluation   Outcome Summary/Follow up Plan FF ML U1 scant lochia. wearing binder. po pain meds for trigeminal neuralgia pain. passing gas more often.      Goal: Adult Individualization and Mutuality  Outcome: Ongoing (interventions implemented as appropriate)   18 1849 18 0309 18 0359   Individualization   Patient Specific Preferences --  --  pain control   Patient Specific Goals relief from trigeminal pain --  --    Patient Specific Interventions --  neurontin and oxcarbazepine for trigeminal neuralgia pain --    Mutuality/Individual Preferences   What Anxieties, Fears or Concerns Do You Have About Your Health or Care? --  relief from pain  --    What Questions Do You Have About Your Health or Care? --   care questions --      Goal: Discharge Needs Assessment  Outcome: Ongoing (interventions implemented as appropriate)      Problem: Postpartum ( Delivery) (Adult)  Goal: Signs and Symptoms of Listed Potential Problems Will be Absent or Manageable (Postpartum)  Outcome: Ongoing (interventions implemented as appropriate)

## 2018-01-13 NOTE — DISCHARGE INSTRUCTIONS
Follow titration schedule of Trileptal (oxcarbazepine). Do not increase if you do not tolerate and increase only to effective dose:    Day 1 through 3 Trileptal 150 mg every 12 hours  Day 4 through 6 Trileptal 150 mg every 8 hours  Day 7 300 mg (2 tabs of 150 mg) every 12 hours.    Call [facility] Nurse Call Line if any questions They can connect you to Neurology if needed.     Discharge instructions provided, including printed references.

## 2018-01-15 NOTE — PAYOR COMM NOTE
"DC HOME 18  [id number]    [phone]    Jaquelin Rivera (27 y.o. Female)     Date of Birth Social Security Number Address Home Phone MRN    1990  [address] [address]  [id number]    Sikhism Marital Status          [employer]        Admission Date Admission Type Admitting Provider Attending Provider Department, Room/Bed    1/10/18 Elective Soham Osorio MD  [facility] MOTHER BABY 2A, M236/    Discharge Date Discharge Disposition Discharge Destination        2018 Home or Self Care Home            Attending Provider: (none)    Allergies:  Ibuprofen    Isolation:  None   Infection:  None   Code Status:  Prior    Ht:  165 cm (64.96\")   Wt:  114 kg (252 lb)    Admission Cmt:  None   Principal Problem:  Term pregnancy [Z34.80]                 Active Insurance as of 1/10/2018     Primary Coverage     Payor Plan Insurance Group Employer/Plan Group    [employer]HE[employer] BLUE CROSS AN[employer]M BLUE CROSS BLUE SHIELD PPO [id number]     Payor Plan Address Payor Plan Phone Number Effective From Effective To    [address] [phone] 2016     [place] [address]       Subscriber Name Subscriber Birth Date Member ID       JAQUELIN RIVERA 1990 [id number]                 Emergency Contacts      (Rel.) Home Phone Work Phone Mobile Phone    Altaf Rivera (Spouse) [phone] -- --               Discharge Summary      Navi Copeland MD at 2018  9:10 AM          Discharge Summary    [facility]  Jaquelin Rivera  : 1990  MRN: [id number]  CSN: [id number]    Date of Admission: 1/10/2018   Date of Discharge:  2018   Delivering Physician: Soham Osorio MD       Admission Diagnosis: 1. Term pregnancy [Z34.80]   Discharge Diagnosis: 1. Pregnancy at 40w0d - delivered       Procedures: 1. Primary  (LTCS)     Hospital Course  See the completed operative report for details regarding antepartum course and " delivery.    Her post-operative course was unremarkable.  On POD # 3 she felt like she ready ready for D/C.  She was evaluated by Dr. Copeland who agreed she was able to be discharged to home.  She had no febrile morbidity. She had normal bowel and bladder function and was hemodynamically stable.  Her wound was healing well without obvious signs of infections. Neurology was consulted for treatment of her trigeminal neuralgia.    Infant  female  fetus weighing 3010 g (6 lb 10.2 oz)   Apgars -  8  @ 1 minute /  9  @ 5 minutes.    Discharge labs  Lab Results   Component Value Date    WBC 14.68 (H) 01/11/2018    HGB 11.6 (L) 01/11/2018    HCT 33.5 (L) 01/11/2018     01/11/2018       Discharge Medications   Jaquelin Rivera   Home Medication Instructions [id number]    Printed on:01/13/18 0910   Medication Information                      gabapentin (NEURONTIN) 600 MG tablet  Take 1 tablet by mouth Every 8 (Eight) Hours.             OXcarbazepine (TRILEPTAL) 150 MG tablet  Begin 150 mg 1 tab every 12 hours and increase by 150 mg (1 tab) every 3 days until on 300 mg (2 tabs) every 12 hours.             oxyCODONE-acetaminophen (PERCOCET) 7.5-325 MG per tablet  Take 1 tablet by mouth Every 4 (Four) Hours As Needed for Moderate Pain  for up to 8 days.             Prenatal Vit-DSS-Fe Fum-FA (PNV FE FUM/DOCUSATE/FOLIC ACID) 29-1 MG tablet  Take 1 tablet by mouth.                 Discharge Disposition Home or Self Care   Condition on Discharge: good   Follow-up: 1 week with Jo Copeland MD  1/13/2018        Electronically signed by Navi Copeland MD at 1/13/2018  9:11 AM

## 2018-01-16 LAB
CYTO UR: NORMAL
LAB AP CASE REPORT: NORMAL
LAB AP CLINICAL INFORMATION: NORMAL
Lab: NORMAL
PATH REPORT.FINAL DX SPEC: NORMAL
PATH REPORT.GROSS SPEC: NORMAL

## 2018-01-17 ENCOUNTER — POSTPARTUM VISIT (OUTPATIENT)
Dept: OBSTETRICS AND GYNECOLOGY | Facility: CLINIC | Age: 28
End: 2018-01-17

## 2018-01-17 VITALS
BODY MASS INDEX: 41.82 KG/M2 | DIASTOLIC BLOOD PRESSURE: 74 MMHG | WEIGHT: 251 LBS | HEIGHT: 65 IN | SYSTOLIC BLOOD PRESSURE: 118 MMHG

## 2018-01-17 DIAGNOSIS — Z78.9 NON-SMOKER: ICD-10-CM

## 2018-01-17 PROCEDURE — 0503F POSTPARTUM CARE VISIT: CPT | Performed by: OBSTETRICS & GYNECOLOGY

## 2018-01-17 NOTE — PROGRESS NOTES
Subjective   Jaquelin Rivera is a 27 y.o. female is here today for follow-up.  1 week s/p  section.  Doing well.  Bottle feeding.  Trigeminal neuralgia much improved; fas f/u with neurologist next week.  Has had some crying spells and blues.  Denies SI/HI.    History of Present Illness    The following portions of the patient's history were reviewed and updated as appropriate: allergies, current medications, past family history, past medical history, past social history, past surgical history and problem list.    Review of Systems   Genitourinary: Positive for vaginal bleeding. Negative for vaginal discharge and vaginal pain.   Psychiatric/Behavioral: Negative for self-injury and suicidal ideas. The patient is nervous/anxious.    All other systems reviewed and are negative.      Objective   Physical Exam   Constitutional: She is oriented to person, place, and time. She appears well-developed and well-nourished.   HENT:   Head: Normocephalic and atraumatic.   Abdominal:       Neurological: She is alert and oriented to person, place, and time.   Skin: Skin is warm and dry.   Psychiatric: She has a normal mood and affect. Her behavior is normal. Judgment and thought content normal.   Nursing note and vitals reviewed.        Assessment/Plan   Jaquelin was seen today for post-op.    Diagnoses and all orders for this visit:    Postpartum examination following  delivery    Post partum depression    Non-smoker    Other orders  -     sertraline (ZOLOFT) 50 MG tablet; Take 1 tablet by mouth Daily.        Soham Osorio MD

## 2018-01-22 ENCOUNTER — OFFICE VISIT (OUTPATIENT)
Dept: NEUROLOGY | Age: 28
End: 2018-01-22
Payer: COMMERCIAL

## 2018-01-22 VITALS
WEIGHT: 247 LBS | HEART RATE: 86 BPM | SYSTOLIC BLOOD PRESSURE: 138 MMHG | BODY MASS INDEX: 41.15 KG/M2 | DIASTOLIC BLOOD PRESSURE: 93 MMHG | HEIGHT: 65 IN

## 2018-01-22 DIAGNOSIS — G50.0 TRIGEMINAL NEURALGIA OF RIGHT SIDE OF FACE: Primary | ICD-10-CM

## 2018-01-22 PROCEDURE — 99213 OFFICE O/P EST LOW 20 MIN: CPT | Performed by: PSYCHIATRY & NEUROLOGY

## 2018-01-22 RX ORDER — OXCARBAZEPINE 300 MG/1
300 TABLET, FILM COATED ORAL 2 TIMES DAILY
Qty: 60 TABLET | Refills: 11 | Status: SHIPPED | OUTPATIENT
Start: 2018-01-22 | End: 2018-04-13

## 2018-01-22 RX ORDER — OXCARBAZEPINE 150 MG/1
150 TABLET, FILM COATED ORAL DAILY
COMMUNITY
Start: 2018-01-12 | End: 2018-01-22

## 2018-01-22 NOTE — PROGRESS NOTES
[facility] Neurology Follow Up Encounter  2018 3:19 PM    Information:   Patient Name: Rambo Gray  :   1990  Age:   32 y.o. MRN:   [id number]  Account #:  [de-identified]  Today:  18    Provider: Adrian Daley M.D. Chief Complaint:   Chief Complaint   Patient presents with    Follow-up     Patient states she is feeling better since the birth of her child. she would like to discuss her medications today. Subjective:   Rambo Gray is a 32 y.o. woman  with a history of right trigeminal neuralgia who is following up. She got off her medications during pregnancy but was put back on gabapentin due to sever right V3 neuralgic pain. It did not help at a high dose. She had her daughter 2 weeks ago. She started oxcarbazepine 150 mg BID. She has had some twinges of right facial pain. She previously had no side effects with 300 mg BID. Objective:     Past Medical History:  Past Medical History:   Diagnosis Date    35 weeks gestation of pregnancy 2017    Trigeminal neuralgia        Past Surgical History:   Procedure Laterality Date     SECTION  2018    FACIAL NERVE SURGERY      radiosurgery for trigeminal neuralgia    TONSILLECTOMY AND ADENOIDECTOMY      WISDOM TOOTH EXTRACTION         Recent Hospitalizations  · None    Significant Injuries  · None    Habits  Rambo Gray reports that she has never smoked. She does not have any smokeless tobacco history on file. She reports that she does not drink alcohol or use drugs. History reviewed. No pertinent family history. Social History  Jamie De La Torre is , lives in [place], [place], and works at a bank. Medications:  Current Outpatient Prescriptions   Medication Sig Dispense Refill    sertraline (ZOLOFT) 50 MG tablet Take 50 mg by mouth daily      OXcarbazepine (TRILEPTAL) 150 MG tablet Take 150 mg by mouth daily Start off taking 150 mg daily, then titrate up to 2 tablets every 12 hours.

## 2018-02-15 RX ORDER — HYDROCODONE BITARTRATE AND ACETAMINOPHEN 10; 325 MG/1; MG/1
1 TABLET ORAL EVERY 6 HOURS PRN
Qty: 60 TABLET | Refills: 0 | Status: SHIPPED | OUTPATIENT
Start: 2018-02-15 | End: 2018-03-17

## 2018-02-16 ENCOUNTER — OFFICE VISIT (OUTPATIENT)
Dept: OBSTETRICS AND GYNECOLOGY | Facility: CLINIC | Age: 28
End: 2018-02-16

## 2018-02-16 VITALS
SYSTOLIC BLOOD PRESSURE: 124 MMHG | BODY MASS INDEX: 41.15 KG/M2 | WEIGHT: 247 LBS | DIASTOLIC BLOOD PRESSURE: 76 MMHG | HEIGHT: 65 IN

## 2018-02-16 DIAGNOSIS — Z30.011 ENCOUNTER FOR INITIAL PRESCRIPTION OF CONTRACEPTIVE PILLS: ICD-10-CM

## 2018-02-16 DIAGNOSIS — Z78.9 NON-SMOKER: ICD-10-CM

## 2018-02-16 PROCEDURE — 0503F POSTPARTUM CARE VISIT: CPT | Performed by: OBSTETRICS & GYNECOLOGY

## 2018-02-16 NOTE — PROGRESS NOTES
Subjective   Jaquelin Rivera is a 27 y.o. female is here today for follow-up.  S/P c section 6 weeks ago.  Desires OCPs.   getting vasectomy.    History of Present Illness    The following portions of the patient's history were reviewed and updated as appropriate: allergies, current medications, past family history, past medical history, past social history, past surgical history and problem list.    Review of Systems   All other systems reviewed and are negative.      Objective   Physical Exam   Constitutional: She is oriented to person, place, and time. She appears well-developed and well-nourished.   HENT:   Head: Normocephalic and atraumatic.   Abdominal:       Neurological: She is alert and oriented to person, place, and time.   Skin: Skin is warm and dry.   Psychiatric: She has a normal mood and affect. Her behavior is normal. Judgment and thought content normal.   Nursing note and vitals reviewed.        Assessment/Plan   Jaquelin was seen today for postpartum care.    Diagnoses and all orders for this visit:    Postpartum examination following  delivery    Encounter for initial prescription of contraceptive pills    Non-smoker    Other orders  -     Norethin-Eth Estrad-Fe Biphas (LO LOESTRIN FE) 1 MG-10 MCG / 10 MCG tablet; Take 1 tablet by mouth Daily for 28 days.        Soham Osorio MD

## 2018-02-20 ENCOUNTER — HOSPITAL ENCOUNTER (OUTPATIENT)
Dept: HOSPITAL 58 - LAB | Age: 28
Discharge: HOME | End: 2018-02-20
Attending: NURSE PRACTITIONER

## 2018-02-20 DIAGNOSIS — R05: Primary | ICD-10-CM

## 2018-02-20 PROCEDURE — 87651 STREP A DNA AMP PROBE: CPT

## 2018-02-20 PROCEDURE — 87804 INFLUENZA ASSAY W/OPTIC: CPT

## 2018-04-06 ENCOUNTER — TELEPHONE (OUTPATIENT)
Dept: NEUROLOGY | Age: 28
End: 2018-04-06

## 2018-04-13 RX ORDER — OXCARBAZEPINE 600 MG/1
TABLET, FILM COATED ORAL
Qty: 60 TABLET | Refills: 5 | Status: SHIPPED | OUTPATIENT
Start: 2018-04-13 | End: 2019-09-09

## 2018-05-16 ENCOUNTER — OFFICE VISIT (OUTPATIENT)
Dept: OBSTETRICS AND GYNECOLOGY | Facility: CLINIC | Age: 28
End: 2018-05-16

## 2018-05-16 VITALS
DIASTOLIC BLOOD PRESSURE: 70 MMHG | HEIGHT: 65 IN | BODY MASS INDEX: 43.15 KG/M2 | WEIGHT: 259 LBS | SYSTOLIC BLOOD PRESSURE: 134 MMHG

## 2018-05-16 DIAGNOSIS — Z30.012 ENCOUNTER FOR PRESCRIPTION OF EMERGENCY CONTRACEPTION: ICD-10-CM

## 2018-05-16 DIAGNOSIS — R22.31 MASS OF RIGHT AXILLA: ICD-10-CM

## 2018-05-16 DIAGNOSIS — Z01.419 WELL WOMAN EXAM WITH ROUTINE GYNECOLOGICAL EXAM: Primary | ICD-10-CM

## 2018-05-16 PROCEDURE — G0123 SCREEN CERV/VAG THIN LAYER: HCPCS | Performed by: NURSE PRACTITIONER

## 2018-05-16 PROCEDURE — 99395 PREV VISIT EST AGE 18-39: CPT | Performed by: NURSE PRACTITIONER

## 2018-05-16 RX ORDER — OXCARBAZEPINE 600 MG/1
TABLET, FILM COATED ORAL
COMMUNITY
Start: 2018-04-13 | End: 2018-07-24

## 2018-05-16 RX ORDER — HYDROCODONE BITARTRATE AND ACETAMINOPHEN 10; 325 MG/1; MG/1
TABLET ORAL
Refills: 0 | COMMUNITY
Start: 2018-02-16 | End: 2018-07-24

## 2018-05-16 RX ORDER — SULFAMETHOXAZOLE AND TRIMETHOPRIM 800; 160 MG/1; MG/1
1 TABLET ORAL 2 TIMES DAILY
Qty: 14 TABLET | Refills: 0 | Status: SHIPPED | OUTPATIENT
Start: 2018-05-16 | End: 2018-07-24

## 2018-05-16 RX ORDER — NORETHINDRONE ACETATE AND ETHINYL ESTRADIOL, ETHINYL ESTRADIOL AND FERROUS FUMARATE 1MG-10(24)
KIT ORAL
Refills: 2 | COMMUNITY
Start: 2018-05-09 | End: 2018-05-16 | Stop reason: SDUPTHER

## 2018-05-16 RX ORDER — NORETHINDRONE ACETATE AND ETHINYL ESTRADIOL, ETHINYL ESTRADIOL AND FERROUS FUMARATE 1MG-10(24)
1 KIT ORAL DAILY
Qty: 28 TABLET | Refills: 3 | COMMUNITY
Start: 2018-05-16 | End: 2018-09-26 | Stop reason: SDUPTHER

## 2018-05-16 NOTE — PROGRESS NOTES
Jaquelin Rivera is a 27 y.o.      Chief Complaint   Patient presents with   • Gynecologic Exam     Pt is here for annual exam PT is well no c/o            HPI - LMP 3/2018.  She is doing well and her baby is 4 months old.  She is taking LoLo Estrin and does not have an actual periodbut spotting. She is taking Tri;eptal for Trigeminal neuralgia and is using condoms as she knows Trileptal can decrease eff. Of BCP.  Her  is going to get a vasectomy the near future.      The following portions of the patient's history were reviewed and updated as appropriate:vital signs, allergies, current medications, past family history, past medical history, past social history, past surgical history and problem list.      Current Outpatient Prescriptions:   •  HYDROcodone-acetaminophen (NORCO)  MG per tablet, , Disp: , Rfl: 0  •  LO LOESTRIN FE 1 MG-10 MCG / 10 MCG tablet, , Disp: , Rfl: 2  •  OXcarbazepine (TRILEPTAL) 600 MG tablet, 1 PO BID, Disp: , Rfl:     Review of Systems   Constitutional: Negative for activity change, chills and fatigue.        Obese   HENT: Negative for congestion, drooling, facial swelling, mouth sores, rhinorrhea, sinus pressure, sore throat and trouble swallowing.         Facial pain due to trigeminal neuralgia   Eyes: Negative for photophobia, pain and discharge.   Respiratory: Negative for apnea, choking and shortness of breath.    Cardiovascular: Negative for chest pain and leg swelling.   Gastrointestinal: Negative for abdominal distention, anal bleeding and diarrhea.   Endocrine: Negative for cold intolerance and polydipsia.   Genitourinary: Negative for decreased urine volume, difficulty urinating, dysuria, genital sores, pelvic pain and vaginal discharge.   Musculoskeletal: Negative for arthralgias and gait problem.   Skin: Negative for color change.   Neurological: Negative for dizziness, syncope, weakness and light-headedness.        Trigeminal neuralgia  "  Psychiatric/Behavioral: Negative for agitation, decreased concentration and sleep disturbance. The patient is not hyperactive.    All other systems reviewed and are negative.    Breast ROS: negative    Objective      /70   Ht 165.1 cm (65\")   Wt 117 kg (259 lb)   LMP  (LMP Unknown)   BMI 43.10 kg/m²       Physical Exam   Constitutional: She is oriented to person, place, and time. She appears well-developed and well-nourished. No distress.   HENT:   Head: Normocephalic.   Right Ear: External ear normal.   Left Ear: External ear normal.   Nose: Nose normal.   Mouth/Throat: Oropharynx is clear and moist.   Eyes: Conjunctivae are normal. Right eye exhibits no discharge. Left eye exhibits no discharge. No scleral icterus.   Neck: Normal range of motion. Neck supple. Carotid bruit is not present. No tracheal deviation present. No thyromegaly present.   Cardiovascular: Normal rate, regular rhythm, normal heart sounds and intact distal pulses.    No murmur heard.  Pulmonary/Chest: Effort normal and breath sounds normal. No respiratory distress. She has no wheezes. Right breast exhibits no inverted nipple, no mass, no nipple discharge, no skin change and no tenderness. Left breast exhibits no inverted nipple, no mass, no nipple discharge, no skin change and no tenderness. Breasts are symmetrical. There is no breast swelling.   Abdominal: Soft. She exhibits no distension and no mass. There is no tenderness. There is no guarding. No hernia. Hernia confirmed negative in the right inguinal area and confirmed negative in the left inguinal area.   Genitourinary: Rectum normal, vagina normal and uterus normal. Rectal exam shows no mass. No breast tenderness, discharge or bleeding. Pelvic exam was performed with patient supine. There is no rash, tenderness, lesion or injury on the right labia. There is no rash, tenderness, lesion or injury on the left labia. Uterus is not enlarged, not fixed and not tender. Cervix " exhibits no motion tenderness, no discharge and no friability. Right adnexum displays no mass, no tenderness and no fullness. Left adnexum displays no mass, no tenderness and no fullness. No erythema, tenderness or bleeding in the vagina. No foreign body in the vagina. No signs of injury around the vagina. No vaginal discharge found.   Genitourinary Comments:   BSU normal  Urethral meatus  Normal  Perineum  Normal   Musculoskeletal: Normal range of motion. She exhibits no edema or tenderness.   Lymphadenopathy:     She has axillary adenopathy (right axilla 3 cm infected hair follicle).   Neurological: She is alert and oriented to person, place, and time. Coordination normal.   Skin: Skin is warm and dry. No bruising and no rash noted. She is not diaphoretic. No erythema.   Psychiatric: She has a normal mood and affect. Her behavior is normal. Judgment and thought content normal.   Nursing note and vitals reviewed.       Assessment/Plan     ASSESSMENT/PLAN      Jaquelin was seen today for gynecologic exam.    Diagnoses and all orders for this visit:        Diagnoses and all orders for this visit:    Well woman exam with routine gynecological exam  -     Liquid-based Pap Smear, Screening    BMI 40.0-44.9, adult  The patient is asked to make an attempt to improve diet and exercise patterns to aid in medical management of this problem.      Mass of right axilla  infected hair follicle , tender Bactrim DS bid    Encounter for prescription of emergency contraception  -     LO LOESTRIN FE 1 MG-10 MCG / 10 MCG tablet; Take 1 tablet by mouth Daily.    Other orders  -      -     sulfamethoxazole-trimethoprim (BACTRIM DS) 800-160 MG per tablet; Take 1 tablet by mouth 2 (Two) Times a Day.    Patient is counseled re: BCP use, risks/benifits, and side effects.  Patient knows to use condoms with the bcp and Trileptal due decrease eff.   plans to get a vasectomy.            Jeannie Latif, APRN  5/16/2018

## 2018-05-18 LAB
GEN CATEG CVX/VAG CYTO-IMP: NORMAL
LAB AP CASE REPORT: NORMAL
LAB AP GYN ADDITIONAL INFORMATION: NORMAL
Lab: NORMAL
PATH INTERP SPEC-IMP: NORMAL
STAT OF ADQ CVX/VAG CYTO-IMP: NORMAL

## 2018-06-26 ENCOUNTER — TELEPHONE (OUTPATIENT)
Dept: NEUROLOGY | Age: 28
End: 2018-06-26

## 2018-07-24 ENCOUNTER — TELEPHONE (OUTPATIENT)
Dept: OBSTETRICS AND GYNECOLOGY | Facility: CLINIC | Age: 28
End: 2018-07-24

## 2018-07-24 ENCOUNTER — OFFICE VISIT (OUTPATIENT)
Dept: OBSTETRICS AND GYNECOLOGY | Facility: CLINIC | Age: 28
End: 2018-07-24

## 2018-07-24 VITALS
HEIGHT: 65 IN | DIASTOLIC BLOOD PRESSURE: 90 MMHG | BODY MASS INDEX: 44.82 KG/M2 | WEIGHT: 269 LBS | SYSTOLIC BLOOD PRESSURE: 130 MMHG

## 2018-07-24 DIAGNOSIS — Z78.9 NON-SMOKER: ICD-10-CM

## 2018-07-24 DIAGNOSIS — F41.9 ANXIETY: ICD-10-CM

## 2018-07-24 PROCEDURE — 99214 OFFICE O/P EST MOD 30 MIN: CPT | Performed by: OBSTETRICS & GYNECOLOGY

## 2018-07-24 RX ORDER — OXCARBAZEPINE 300 MG/1
TABLET, FILM COATED ORAL
Refills: 4 | COMMUNITY
Start: 2018-07-21 | End: 2018-07-24 | Stop reason: SDUPTHER

## 2018-07-24 RX ORDER — BUPROPION HYDROCHLORIDE 150 MG/1
300 TABLET ORAL EVERY MORNING
Qty: 30 TABLET | Refills: 2 | Status: SHIPPED | OUTPATIENT
Start: 2018-07-24 | End: 2018-07-24 | Stop reason: SDUPTHER

## 2018-07-24 RX ORDER — OXCARBAZEPINE 300 MG/1
600 TABLET, FILM COATED ORAL 2 TIMES DAILY
COMMUNITY
End: 2019-06-13 | Stop reason: CLARIF

## 2018-07-24 RX ORDER — BUPROPION HYDROCHLORIDE 150 MG/1
300 TABLET ORAL EVERY MORNING
Qty: 60 TABLET | Refills: 2 | Status: SHIPPED | OUTPATIENT
Start: 2018-07-24 | End: 2018-09-06 | Stop reason: SDUPTHER

## 2018-07-24 NOTE — PROGRESS NOTES
"Subjective   Chief Complaint   Patient presents with   • Depression     Pt is having depression. Started occuring back in May. Pt is at point that she is crying and doesn't want to be around her baby. States she would never harm her.  Pt is very emotional.     Jaquelin Rivera is a 27 y.o. year old .  Patient's last menstrual period was 2018 (exact date).  She presents to be seen because of PP depression.  She reports that over the weekend she finally admitted to her dad and  that she had been having less interest in her baby and she feels like her \"nerves are shot\" every time baby gets fussy.  She often gets frustrated with the baby and she just has to leave the room.  The patient expresses exhaustion about working any trying to be a mother.  She is trying to \"make herself\" interact with baby, but she really does not want anything to do with her daughter and avoids being around her when possible.  She denies any thoughts of hurting baby, but also admits that she often gives the baby to her  because she \"knows that she has to walk away\".  She has had no thoughts of hurting herself.  Jaquelin is not getting any time for herself.  She [place] helping plan her sister's wedding.  Patient eating secondary to stress, and that has caused almost 20# weight gain.    She had a long flare of her trigeminal neuralgia in , but has not ever had depression outside of that period.  She was on medication, but was able to stop that eventually.  Since delivery, she feels like pain from her trigeminal neuralgia has been mostly-better.    Patient works at a [employer] and this is a busy season for her.  She is currently working 9-10 hours/day and says that she feels a lot of stress about work.       The following portions of the patient's history were reviewed and updated as appropriate:current medications and allergies    Smoking status: " "Never Smoker                                                              Smokeless tobacco: Never Used                        Review of Systems   Constitutional: Positive for unexpected weight change. Negative for activity change.   Respiratory: Negative for shortness of breath.    Cardiovascular: Negative for chest pain.   Gastrointestinal: Negative for abdominal pain.   Genitourinary: Negative for dyspareunia and menstrual problem.        No libido   Neurological: Negative for dizziness and headaches.   Psychiatric/Behavioral: Positive for dysphoric mood and sleep disturbance (restless). The patient is nervous/anxious (in big crowds if baby is with patient).          Objective   /90   Ht 165.1 cm (65\")   Wt 122 kg (269 lb)   LMP 07/12/2018 (Exact Date)   Breastfeeding? No   BMI 44.76 kg/m²     Physical Exam   Constitutional: She is oriented to person, place, and time. She appears well-developed and well-nourished. No distress.   HENT:   Head: Normocephalic and atraumatic.   Eyes: EOM are normal.   Neck: Normal range of motion.   Pulmonary/Chest: Effort normal.   Abdominal: Soft. She exhibits no distension. There is no tenderness.   Musculoskeletal: Normal range of motion.   Neurological: She is alert and oriented to person, place, and time.   Skin: Skin is warm and dry.   Psychiatric: She has a normal mood and affect. Her behavior is normal. Judgment normal.   Nursing note and vitals reviewed.      Lab Review   No data reviewed    Imaging   No data reviewed     Assessment & Plan  Jaquelin was seen today for depression.    Diagnoses and all orders for this visit:    Postpartum depression: The patient is struggling with the fact that she does not feel she is bonding appropriately with her baby.  Patient was reassured that medication can help.  The patient was counseled extensively that being a new mom is hard, and that exhaustion and frustration are common.  We discussed specific ways that she can " include the baby in daily activities to help with bonding.  We have also discussed the importance of exercise, as it can be a powerful stress reducer.  We specifically discussed taking walks with the baby in a stroller, and doing mom & me floor exercises that can utilize baby.  The patient specifically denies any thoughts of hurting herself or the baby.  She has never had any suicidal ideation.  We discussed her weight gain, which she attributes to stress eating, and chose Wellbutrin as the best drug for her to try since it can help with weight loss, or at least be considered weight-neutral.  The patient was strongly encouraged to call us if she ever started having thoughts that were scary to her, or thoughts that included harming herself or the baby.  In addition, the patient was encouraged to carve out time for herself, by scheduling one hour each week that she was allowed to do whatever she wanted well someone else in the family cared for the baby.  She promised to call back if she felt like she was getting any worse; she will return to the office for follow-up in 4 weeks.  -     buPROPion XL (WELLBUTRIN XL) 150 MG 24 hr tablet; Take 2 tablets by mouth Every Morning.    Non-smoker    Anxiety        This note was electronically signed.    Daria Alejo MD  July 24, 2018  2:18 PM    Total time spent today with Jaquelin  was 30 minutes (level 4).  Greater than 50% of the time was spent coordinating care, answering her questions and counseling regarding pathophysiology of her presenting problem along with plans for any diagnositc work-up and treatment.

## 2018-07-24 NOTE — TELEPHONE ENCOUNTER
Pharmacy needed clarification of wellbutrin. zeb said its 300mg so I sent a new script with quantity change

## 2018-07-26 ENCOUNTER — OFFICE VISIT (OUTPATIENT)
Dept: NEUROLOGY | Age: 28
End: 2018-07-26
Payer: COMMERCIAL

## 2018-07-26 VITALS
DIASTOLIC BLOOD PRESSURE: 97 MMHG | SYSTOLIC BLOOD PRESSURE: 142 MMHG | WEIGHT: 272.8 LBS | HEIGHT: 65 IN | HEART RATE: 89 BPM | BODY MASS INDEX: 45.45 KG/M2

## 2018-07-26 DIAGNOSIS — G50.0 TRIGEMINAL NEURALGIA OF RIGHT SIDE OF FACE: Primary | ICD-10-CM

## 2018-07-26 PROCEDURE — 99213 OFFICE O/P EST LOW 20 MIN: CPT | Performed by: PSYCHIATRY & NEUROLOGY

## 2018-07-26 RX ORDER — BUPROPION HYDROCHLORIDE 300 MG/1
300 TABLET ORAL 2 TIMES DAILY
COMMUNITY
End: 2019-11-26

## 2018-09-04 ENCOUNTER — TELEPHONE (OUTPATIENT)
Dept: NEUROLOGY | Age: 28
End: 2018-09-04

## 2018-09-04 DIAGNOSIS — G50.0 TRIGEMINAL NEURALGIA OF RIGHT SIDE OF FACE: Primary | ICD-10-CM

## 2018-09-04 NOTE — TELEPHONE ENCOUNTER
Vicente Ferreira MD 2 days ago          [facility]. Ive had to have a couple cavities filled and it has flaired my Trigeminal Neuralgia, as soon as I can get over the flair I have to go back to another one fixed. I have ran out of my Norco 10mg prescription from January.  Would it be possible to get a refill sent to my pharmacy or will I need to come in and be seen?       Thank you,   Gloria Medico

## 2018-09-06 ENCOUNTER — OFFICE VISIT (OUTPATIENT)
Dept: OBSTETRICS AND GYNECOLOGY | Facility: CLINIC | Age: 28
End: 2018-09-06

## 2018-09-06 VITALS
BODY MASS INDEX: 45.32 KG/M2 | WEIGHT: 272 LBS | DIASTOLIC BLOOD PRESSURE: 90 MMHG | SYSTOLIC BLOOD PRESSURE: 138 MMHG | HEIGHT: 65 IN

## 2018-09-06 DIAGNOSIS — R68.82 LOW LIBIDO: ICD-10-CM

## 2018-09-06 DIAGNOSIS — Z78.9 NON-SMOKER: ICD-10-CM

## 2018-09-06 PROCEDURE — 99213 OFFICE O/P EST LOW 20 MIN: CPT | Performed by: OBSTETRICS & GYNECOLOGY

## 2018-09-06 RX ORDER — BUPROPION HYDROCHLORIDE 150 MG/1
300 TABLET ORAL EVERY MORNING
Qty: 60 TABLET | Refills: 5 | Status: SHIPPED | OUTPATIENT
Start: 2018-09-06 | End: 2019-09-06

## 2018-09-06 NOTE — PROGRESS NOTES
Subjective   Chief Complaint   Patient presents with   • med check     pt here today to follow up on meds. pt says that she is doing well on her wellbutrin. pt says she is not getting as frustrated with baby as before. pt voices being a bit happier. pt voices no other concerns.      Jaquelin Rivera is a 27 y.o. year old .  Patient's last menstrual period was 2018 (exact date).  She presents to be seen for follow-up of depression/anxiety.  Patient now 8 months s/p C/S.  She started Wellbutrin 5-6 weeks ago and can tell the baby stresses her out a lot less now.  She also feels like she is starting to bond better with baby - she is walking and taking baby with her several times each week and has started incorporating baby into some floor exercises (something we discussed at her last visit).   The patient has enjoyed taking 1 hour for herself each week, something else we discussed, and feels like this is helpful.  She denies any side effects from medication.    Patient reports that she is sleeping better, except for flare of Menierre's that she usually experiences this time every year.    The following portions of the patient's history were reviewed and updated as appropriate:current medications and allergies    Smoking status: Never Smoker                                                              Smokeless tobacco: Never Used                        Review of Systems   Constitutional: Positive for unexpected weight change. Negative for activity change.   Respiratory: Negative for shortness of breath.    Cardiovascular: Negative for chest pain.   Gastrointestinal: Negative for abdominal pain.   Genitourinary: Negative for dyspareunia and menstrual problem.        Low libido   Neurological: Negative for dizziness and headaches.   Psychiatric/Behavioral: Positive for sleep disturbance (also improved). Negative for dysphoric mood. The patient is nervous/anxious (better).          Objective   /90    "Ht 165.1 cm (65\")   Wt 123 kg (272 lb)   LMP 08/22/2018 (Exact Date)   BMI 45.26 kg/m²     Physical Exam   Constitutional: She is oriented to person, place, and time. She appears well-developed and well-nourished. No distress.   HENT:   Head: Normocephalic and atraumatic.   Eyes: EOM are normal.   Neck: Normal range of motion.   Pulmonary/Chest: Effort normal.   Abdominal: Soft. She exhibits no distension. There is no tenderness.   Musculoskeletal: Normal range of motion.   Neurological: She is alert and oriented to person, place, and time.   Skin: Skin is warm and dry.   Psychiatric: She has a normal mood and affect. Her behavior is normal. Judgment normal.   Nursing note and vitals reviewed.      Lab Review   No data reviewed    Imaging   No data reviewed     Assessment & Plan    Jaquelin was seen today for med check.    Diagnoses and all orders for this visit:    Postpartum depression: Significantly improved.  Patient wants to continue  -     buPROPion XL (WELLBUTRIN XL) 150 MG 24 hr tablet; Take 2 tablets by mouth Every Morning.    Non-smoker    Low libido: No better, no worse, but does not seem to be exacerbated by Wellbutrin.  Seems to be just post-partum    BMI 45.0-49.9, adult (CMS/[facility]): Patient agreeable to going to see dietitian.  Order placed.      This note was electronically signed.    Daria Alejo MD  September 6, 2018  8:52 AM    Total time spent today with Jaquelin  was 20 minutes (level 3).  Greater than 50% of the time was spent coordinating care, answering her questions and counseling regarding pathophysiology of her presenting problem along with plans for any diagnositc work-up and treatment.      "

## 2018-09-07 RX ORDER — HYDROCODONE BITARTRATE AND ACETAMINOPHEN 10; 325 MG/1; MG/1
1 TABLET ORAL EVERY 6 HOURS PRN
Qty: 60 TABLET | Refills: 0 | Status: SHIPPED | OUTPATIENT
Start: 2018-09-07 | End: 2018-10-07

## 2018-09-26 DIAGNOSIS — Z30.012 ENCOUNTER FOR PRESCRIPTION OF EMERGENCY CONTRACEPTION: ICD-10-CM

## 2019-01-14 DIAGNOSIS — Z30.012 ENCOUNTER FOR PRESCRIPTION OF EMERGENCY CONTRACEPTION: ICD-10-CM

## 2019-01-24 ENCOUNTER — TELEPHONE (OUTPATIENT)
Dept: BARIATRICS/WEIGHT MGMT | Facility: CLINIC | Age: 29
End: 2019-01-24

## 2019-01-24 NOTE — TELEPHONE ENCOUNTER
Called to see if patient would like to reschedule new patient appointment that was cancelled in October 2018. She said not at this time as she has a lot going on.

## 2019-01-28 ENCOUNTER — OFFICE VISIT (OUTPATIENT)
Dept: NEUROLOGY | Age: 29
End: 2019-01-28
Payer: COMMERCIAL

## 2019-01-28 VITALS
HEIGHT: 65 IN | DIASTOLIC BLOOD PRESSURE: 87 MMHG | HEART RATE: 96 BPM | SYSTOLIC BLOOD PRESSURE: 137 MMHG | BODY MASS INDEX: 46.15 KG/M2 | WEIGHT: 277 LBS

## 2019-01-28 DIAGNOSIS — G50.0 TRIGEMINAL NEURALGIA OF RIGHT SIDE OF FACE: Primary | ICD-10-CM

## 2019-01-28 PROCEDURE — 99213 OFFICE O/P EST LOW 20 MIN: CPT | Performed by: PSYCHIATRY & NEUROLOGY

## 2019-01-28 RX ORDER — OXCARBAZEPINE 600 MG/1
TABLET ORAL
Qty: 90 TABLET | Refills: 5 | Status: SHIPPED | OUTPATIENT
Start: 2019-01-28 | End: 2019-09-09 | Stop reason: SDUPTHER

## 2019-01-28 RX ORDER — HYDROCODONE BITARTRATE AND ACETAMINOPHEN 10; 325 MG/1; MG/1
1 TABLET ORAL EVERY 8 HOURS PRN
Qty: 90 TABLET | Refills: 0 | Status: SHIPPED | OUTPATIENT
Start: 2019-01-28 | End: 2019-04-09 | Stop reason: SDUPTHER

## 2019-01-28 RX ORDER — HYDROCODONE BITARTRATE AND ACETAMINOPHEN 10; 325 MG/1; MG/1
1 TABLET ORAL EVERY 8 HOURS PRN
COMMUNITY
End: 2019-01-28 | Stop reason: SDUPTHER

## 2019-01-29 ENCOUNTER — TELEPHONE (OUTPATIENT)
Dept: NEUROLOGY | Age: 29
End: 2019-01-29

## 2019-04-09 DIAGNOSIS — G50.0 TRIGEMINAL NEURALGIA OF RIGHT SIDE OF FACE: ICD-10-CM

## 2019-04-09 RX ORDER — HYDROCODONE BITARTRATE AND ACETAMINOPHEN 10; 325 MG/1; MG/1
1 TABLET ORAL EVERY 8 HOURS PRN
Qty: 90 TABLET | Refills: 0 | Status: SHIPPED | OUTPATIENT
Start: 2019-04-09 | End: 2019-07-11 | Stop reason: SDUPTHER

## 2019-05-09 ENCOUNTER — OFFICE VISIT (OUTPATIENT)
Dept: NEUROLOGY | Age: 29
End: 2019-05-09
Payer: COMMERCIAL

## 2019-05-09 VITALS
SYSTOLIC BLOOD PRESSURE: 149 MMHG | DIASTOLIC BLOOD PRESSURE: 102 MMHG | BODY MASS INDEX: 45.02 KG/M2 | HEART RATE: 87 BPM | RESPIRATION RATE: 18 BRPM | WEIGHT: 270.2 LBS | HEIGHT: 65 IN

## 2019-05-09 DIAGNOSIS — G50.0 TRIGEMINAL NEURALGIA OF RIGHT SIDE OF FACE: Primary | ICD-10-CM

## 2019-05-09 PROCEDURE — 99214 OFFICE O/P EST MOD 30 MIN: CPT | Performed by: PSYCHIATRY & NEUROLOGY

## 2019-05-09 RX ORDER — PREGABALIN 75 MG/1
150 CAPSULE ORAL 2 TIMES DAILY
Qty: 120 CAPSULE | Refills: 2 | Status: SHIPPED | OUTPATIENT
Start: 2019-05-09 | End: 2019-08-13 | Stop reason: SDUPTHER

## 2019-05-09 NOTE — PROGRESS NOTES
[facility]  [address] [place] [address] Drive, [address]  [facility]  Phone [phone]  Fax [phone]     [facility] Follow Up Encounter  2019 12:03 PM    Information:   Patient Name: Napoleon Charles  :   1990  Age:   29 y.o. MRN:   [id number]  Account #:  [de-identified]  Today:  19    Provider: Delorse Hashimoto, M.D. Chief Complaint:   Chief Complaint   Patient presents with    Follow-up     Trigeminal neuralgia flare up       Subjective:   Napoleon Charles is a 29 y.o. woman with a history of right trigeminal neuralgia and previous gamma knife who is following up. She has previously been on Tegretol, gabapentin, and Lyrica. She continues to have persistent right facial pain and dysesthesias, allodynia. She has jabs and electrical pains in the right face several times a day. They are triggered by talking and chewing. Objective:     Past Medical History:  Past Medical History:   Diagnosis Date    Trigeminal neuralgia     Right       Past Surgical History:   Procedure Laterality Date     SECTION  2018    FACIAL NERVE SURGERY      radiosurgery for trigeminal neuralgia    TONSILLECTOMY AND ADENOIDECTOMY      WISDOM TOOTH EXTRACTION         Recent Hospitalizations  · None    Significant Injuries  · None    Habits  Napoleon Charles reports that she has never smoked. She has never used smokeless tobacco. She reports that she does not drink alcohol or use drugs. History reviewed. No pertinent family history. Social History  Kay Fleeting , lives in [place], [place], and works at a bank. Medications:  Current Outpatient Medications   Medication Sig Dispense Refill    HYDROcodone-acetaminophen (NORCO)  MG per tablet Take 1 tablet by mouth every 8 hours as needed for Pain (pain) for up to 30 days.  90 tablet 0    norethindrone-ethinyl estradiol-Fe (LO LOESTRIN FE) 1 MG-10 MCG / 10 MCG tablet Take 1 tablet by mouth      OXTELLAR  MG TB24 3 PO daily. Oxtellar  mg, name brand only 90 tablet 5    buPROPion (WELLBUTRIN XL) 300 MG extended release tablet Take 300 mg by mouth 2 times daily      OXcarbazepine (TRILEPTAL) 600 MG tablet 1 PO BID 60 tablet 5     No current facility-administered medications for this visit. Allergies:   Allergies as of 05/09/2019 - Review Complete 05/09/2019   Allergen Reaction Noted    Motrin [ibuprofen]  05/27/2016       Review of Systems:  General ROS: negative for - chills or fever  Psychological ROS: negative for - anxiety or depression  ENT ROS: negative for - headaches or sinus pain  Hematological and Lymphatic ROS: negative for - bleeding problems, bruising or swollen lymph nodes  Respiratory ROS: negative for - cough, hemoptysis or shortness of breath  Cardiovascular ROS: negative for - chest pain or palpitations  Gastrointestinal ROS: negative for - blood in stools, constipation, diarrhea or nausea/vomiting  Genito-Urinary ROS: negative for - hematuria or urinary frequency/urgency  Musculoskeletal ROS: negative for - joint pain, joint stiffness or joint swelling  Neurological ROS: negative for - memory loss, numbness/tingling or weakness     Examination:  Vitals:  BP (!) 149/102   Pulse 87   Resp 18   Ht 5' 5\" (1.651 m)   Wt 270 lb 3.2 oz (122.6 kg)   BMI 44.96 kg/m²   General appearance:  alert and cooperative with exam  HEENT:  Sclera clear, anicteric, Oropharynx clear, no lesions, Neck supple with midline trachea, Thyroid without masses and Trachea midline  Heart::  regular rate and rhythm, S1, S2 normal, no murmur, click, rub or gallop  Lungs:  clear to auscultation bilaterally  Extremities:  extremities normal, atraumatic, no cyanosis or edema  Neurologic:  Extraocular movements are intact without nystagmus.  Visual fields are full to confrontation.  Facial movements are symmetrical and normal.  Speech is precise.  Extremity strength is normal in both uppers and lowers.  Deep tendon

## 2019-05-13 ENCOUNTER — TELEPHONE (OUTPATIENT)
Dept: NEUROLOGY | Age: 29
End: 2019-05-13

## 2019-05-13 NOTE — TELEPHONE ENCOUNTER
I am sorry it was routed to the wrong Provider, I have been training today and I did not catch this.  I apologize

## 2019-05-28 ENCOUNTER — CLINICAL DOCUMENTATION (OUTPATIENT)
Dept: NEUROLOGY | Age: 29
End: 2019-05-28

## 2019-06-03 DIAGNOSIS — Z30.012 ENCOUNTER FOR PRESCRIPTION OF EMERGENCY CONTRACEPTION: ICD-10-CM

## 2019-06-13 ENCOUNTER — OFFICE VISIT (OUTPATIENT)
Dept: OBSTETRICS AND GYNECOLOGY | Facility: CLINIC | Age: 29
End: 2019-06-13

## 2019-06-13 VITALS
HEIGHT: 65 IN | DIASTOLIC BLOOD PRESSURE: 70 MMHG | SYSTOLIC BLOOD PRESSURE: 128 MMHG | BODY MASS INDEX: 45.32 KG/M2 | WEIGHT: 272 LBS

## 2019-06-13 DIAGNOSIS — Z30.012 ENCOUNTER FOR PRESCRIPTION OF EMERGENCY CONTRACEPTION: ICD-10-CM

## 2019-06-13 DIAGNOSIS — Z01.419 WELL WOMAN EXAM WITH ROUTINE GYNECOLOGICAL EXAM: Primary | ICD-10-CM

## 2019-06-13 DIAGNOSIS — E28.2 PCOS (POLYCYSTIC OVARIAN SYNDROME): ICD-10-CM

## 2019-06-13 PROCEDURE — 99395 PREV VISIT EST AGE 18-39: CPT | Performed by: NURSE PRACTITIONER

## 2019-06-13 RX ORDER — OXCARBAZEPINE 600 MG/1
TABLET ORAL
Refills: 4 | COMMUNITY
Start: 2019-05-09 | End: 2019-12-12 | Stop reason: SDUPTHER

## 2019-06-13 NOTE — PROGRESS NOTES
Jaquelin Rivera is a 28 y.o.      Chief Complaint   Patient presents with   • Gynecologic Exam     Pt is here for annual exam Pt is doing well no c/o            HPI - Jaquelin is in for gyn exam.  She is taking LoLoEstrin  And also using addition contraception.  H=She has history of PCOS.  Her daughter is about 17 months old DELIVERED BY C/section..  She is overweight and is not taking Metformin.  She does C/O some hirsutism on her chin.  She has trigeminal neuralgia and is now taking Lyrica and Trileptal.  She has not had a period lenny past 2 times when scheduled and has taken her pills correctly and is also using additional contraception.  She has had negative pregnancy tests.   She does take Trileptal and now is also taking Lrica to help with the pain associated with Trigeminal Neuralgia.      The following portions of the patient's history were reviewed and updated as appropriate:vital signs, allergies, current medications, past family history, past medical history, past social history, past surgical history and problem list.      Current Outpatient Medications:   •  buPROPion XL (WELLBUTRIN XL) 150 MG 24 hr tablet, Take 2 tablets by mouth Every Morning., Disp: 60 tablet, Rfl: 5  •  LYRICA 75 MG capsule, , Disp: , Rfl: 1  •  Norethin-Eth Estrad-Fe Biphas (LO LOESTRIN FE) 1 MG-10 MCG / 10 MCG tablet, Take 1 tablet by mouth Daily., Disp: 28 tablet, Rfl: 0  •  OXTELLAR  MG tablet sustained-release 24 hour, , Disp: , Rfl: 4    Review of Systems   Constitutional: Negative for activity change and chills.        Obese   HENT: Negative for congestion and dental problem.         Left sided off and on facial pain associated with Trigeminal Neuralgia   Respiratory: Negative for apnea and chest tightness.    Cardiovascular: Negative for chest pain and leg swelling.   Endocrine:        HX OF OBESITY AND PCOS   Genitourinary: Negative for difficulty urinating, hematuria and urgency.   Musculoskeletal:  "Negative for arthralgias.   Skin: Negative for color change and dry skin.        HIRSUTISM  CHIN   (MILD)   Neurological:        Trigeminal Neuralgia  left side of face   Psychiatric/Behavioral: Negative for agitation, behavioral problems and decreased concentration.     Breast ROS: no drainage      Objective      /70   Ht 165.1 cm (65\")   Wt 123 kg (272 lb)   LMP 06/02/2019 (Exact Date)   BMI 45.26 kg/m²       Physical Exam   Constitutional: She is oriented to person, place, and time.   OBESE   HENT:   Head: Normocephalic.   Eyes: Right eye exhibits no discharge. Left eye exhibits no discharge.   Neck: No thyromegaly present.   Cardiovascular: Normal rate and regular rhythm.   No murmur heard.  Pulmonary/Chest: Effort normal. No stridor. No respiratory distress. Right breast exhibits no inverted nipple, no mass and no nipple discharge (NO GALACTORRHEA). Left breast exhibits no inverted nipple, no mass and no nipple discharge.   Abdominal: She exhibits no distension. There is no tenderness. There is no guarding.   Genitourinary: There is no rash, tenderness or lesion on the right labia. There is no rash, tenderness or lesion on the left labia. Uterus is mobile. Uterus is not deviated, enlarged or exhibiting a mass.   Cervix is not parous and not nulliparous. Cervix does not exhibit discharge, friability or lesion. Right adnexum displays no mass, no tenderness and no fullness. Left adnexum displays no mass, no tenderness and no fullness. No erythema, tenderness or bleeding in the vagina. No foreign body in the vagina. No signs of injury around the vagina. No vaginal discharge found.   Neurological: She is alert and oriented to person, place, and time.   Skin: Skin is warm and dry.   Psychiatric: She has a normal mood and affect. Her behavior is normal.   Nursing note and vitals reviewed.       Assessment/Plan       Jaquelin was seen today for gynecologic exam.    Diagnoses and all orders for this " visit:    Well woman exam with routine gynecological exam    PCOS (polycystic ovarian syndrome)  -     Comprehensive Metabolic Panel  -     Cortisol  -     DHEA-Sulfate  -     Hemoglobin A1c  -     Insulin, Total  -     Testosterone  -     TSH  -     T3, Free  -     T4  -     Liquid-based Pap Smear, Screening  -     Prolactin    Encounter for prescription of emergency contraception  -     Norethin-Eth Estrad-Fe Biphas (LO LOESTRIN FE) 1 MG-10 MCG / 10 MCG tablet; Take 1 tablet by mouth Daily.    Pap is done and patient will RTO for PCOS labs and will consider Metformin and/or Spironolactone.  Patient is aware that some anti seizure medications (TRILEPTAL) can decrease the effectiveness of OC's, she is using additional contraception.                    Jeannie Latif, APRN  6/13/2019

## 2019-06-14 LAB
GEN CATEG CVX/VAG CYTO-IMP: NORMAL
LAB AP CASE REPORT: NORMAL
LAB AP GYN ADDITIONAL INFORMATION: NORMAL
PATH INTERP SPEC-IMP: NORMAL
STAT OF ADQ CVX/VAG CYTO-IMP: NORMAL

## 2019-06-17 ENCOUNTER — TELEPHONE (OUTPATIENT)
Dept: OBSTETRICS AND GYNECOLOGY | Facility: CLINIC | Age: 29
End: 2019-06-17

## 2019-06-17 NOTE — TELEPHONE ENCOUNTER
----- Message from ZITA Her sent at 6/13/2019  9:32 PM CDT -----  Please call and remind Jaquelin that if she is taking Trileptal (Oxtellar)  That it can decrease the effectiveness of BCP and to always use additional contraception.  She knew this in the past and is just a reminder.  ZITA Her

## 2019-06-21 LAB
ALBUMIN SERPL-MCNC: 4.8 G/DL (ref 3.5–5.2)
ALBUMIN/GLOB SERPL: 2.2 G/DL
ALP SERPL-CCNC: 66 U/L (ref 39–117)
ALT SERPL-CCNC: 25 U/L (ref 1–33)
AST SERPL-CCNC: 15 U/L (ref 1–32)
BILIRUB SERPL-MCNC: 0.4 MG/DL (ref 0.2–1.2)
BUN SERPL-MCNC: 8 MG/DL (ref 6–20)
BUN/CREAT SERPL: 10.3 (ref 7–25)
CALCIUM SERPL-MCNC: 9.6 MG/DL (ref 8.6–10.5)
CHLORIDE SERPL-SCNC: 105 MMOL/L (ref 98–107)
CO2 SERPL-SCNC: 26.3 MMOL/L (ref 22–29)
CORTIS SERPL-MCNC: 10.7 UG/DL
CREAT SERPL-MCNC: 0.78 MG/DL (ref 0.57–1)
DHEA-S SERPL-MCNC: 115.8 UG/DL (ref 84.8–378)
GLOBULIN SER CALC-MCNC: 2.2 GM/DL
GLUCOSE SERPL-MCNC: 96 MG/DL (ref 65–99)
HBA1C MFR BLD: 4.5 % (ref 4.8–5.6)
INSULIN SERPL-ACNC: 25.8 UIU/ML (ref 2.6–24.9)
POTASSIUM SERPL-SCNC: 4.6 MMOL/L (ref 3.5–5.2)
PROLACTIN SERPL-MCNC: 21 NG/ML (ref 4.8–23.3)
PROT SERPL-MCNC: 7 G/DL (ref 6–8.5)
SODIUM SERPL-SCNC: 143 MMOL/L (ref 136–145)
T3FREE SERPL-MCNC: 2.8 PG/ML (ref 2–4.4)
T4 SERPL-MCNC: 5.2 UG/DL (ref 4.5–12)
TESTOST SERPL-MCNC: 61 NG/DL (ref 8–48)
TSH SERPL DL<=0.005 MIU/L-ACNC: 2.77 MIU/ML (ref 0.27–4.2)

## 2019-06-24 ENCOUNTER — TELEPHONE (OUTPATIENT)
Dept: OBSTETRICS AND GYNECOLOGY | Facility: CLINIC | Age: 29
End: 2019-06-24

## 2019-06-24 DIAGNOSIS — E88.81 INSULIN RESISTANCE: Primary | ICD-10-CM

## 2019-06-24 NOTE — TELEPHONE ENCOUNTER
----- Message from ZITA Her sent at 6/24/2019 10:15 AM CDT -----  Also, tell Jaquelin that her pap is ok.  ZITA Her

## 2019-06-24 NOTE — PROGRESS NOTES
Let Jaquelin know that her insulin level was minimally elevated so I am going to start her on a low dose of Metformin and to be taken with meal.  The Metformin can also lower the testosterone level and over time likely help with facial hair.  Other labs were ok.  I need to see her in 3 months for office visit and lab work.  ZITA Her

## 2019-07-11 DIAGNOSIS — G50.0 TRIGEMINAL NEURALGIA OF RIGHT SIDE OF FACE: ICD-10-CM

## 2019-07-11 RX ORDER — HYDROCODONE BITARTRATE AND ACETAMINOPHEN 10; 325 MG/1; MG/1
1 TABLET ORAL EVERY 8 HOURS PRN
Qty: 90 TABLET | Refills: 0 | Status: SHIPPED | OUTPATIENT
Start: 2019-07-11 | End: 2019-10-22 | Stop reason: SDUPTHER

## 2019-08-21 NOTE — PROGRESS NOTES
strain: Not on file    Food insecurity:     Worry: Not on file     Inability: Not on file    Transportation needs:     Medical: Not on file     Non-medical: Not on file   Tobacco Use    Smoking status: Never Smoker    Smokeless tobacco: Never Used   Substance and Sexual Activity    Alcohol use: No     Alcohol/week: 0.0 standard drinks    Drug use: No    Sexual activity: Not on file   Lifestyle    Physical activity:     Days per week: Not on file     Minutes per session: Not on file    Stress: Not on file   Relationships    Social connections:     Talks on phone: Not on file     Gets together: Not on file     Attends Latter-day service: Not on file     Active member of club or organization: Not on file     Attends meetings of clubs or organizations: Not on file     Relationship status: Not on file    Intimate partner violence:     Fear of current or ex partner: Not on file     Emotionally abused: Not on file     Physically abused: Not on file     Forced sexual activity: Not on file   Other Topics Concern    Not on file   Social History Narrative    Not on file       Medications:  Current Outpatient Medications   Medication Sig Dispense Refill    pregabalin (LYRICA) 75 MG capsule Take 2 capsules by mouth 2 times daily for 90 days. 120 capsule 5    norethindrone-ethinyl estradiol-Fe (LO LOESTRIN FE) 1 MG-10 MCG / 10 MCG tablet Take 1 tablet by mouth      OXTELLAR  MG TB24 3 PO daily. Oxtellar  mg, name brand only 90 tablet 5    buPROPion (WELLBUTRIN XL) 300 MG extended release tablet Take 300 mg by mouth 2 times daily      OXcarbazepine (TRILEPTAL) 600 MG tablet 1 PO BID (Patient not taking: Reported on 8/22/2019) 60 tablet 5     No current facility-administered medications for this visit. Allergies:   Allergies   Allergen Reactions    Motrin [Ibuprofen]        REVIEW OF SYSTEMS     Constitutional: []Fever []Sweats []Chills [] Recent Injury   [x] Denies all unless noted, no bony deformities  Extremities - No clubbing, cyanosis or edema  Skin - Warm, dry, and intact. No rash, erythema, or pallor  Psychiatric - Mood, affect, and behavior appear normal      Neurologic:  Extraocular movements are intact without nystagmus. Visual fields are full to confrontation. Facial movements are symmetrical and normal.  Speech is precise. Extremity strength is normal in both uppers and lowers. Deep tendon reflexes are intact and symmetrical.  Rapid alternating movements are unimpaired. Finger-to-nose testing is performed well, without dysmetria. Gait is normal.      I reviewed the following studies:         []  :  Clinical laboratory test results     []  :  Radiology reports                    [x]  :  Review and summarization of medical records and/or obtain medical records        []  :  Previous/recent polysomnogram report(s)     []  :  [place] Sleepiness Scale        Assessment:       ICD-10-CM    1. Trigeminal neuralgia of right side of face G50.0           []  :  Stable     []  :  Improved                       []  :  Well controlled              []  :  Resolving     []  :  Resolved     [x]  :  Inadequately controlled     []  :  Worsening     []  :  Additional workup planned      Plan:   No orders of the defined types were placed in this encounter. No orders of the defined types were placed in this encounter. 1.  The following educational material has been included in this visit after visit summary for your review: trigeminal neuralgia-  Discussed with the patient and all questions fully answered. 2.  We had a discussion about the clinical issues and went over the various important aspects to consider. Treatment plan discussed. Discussed use, benefit, and SE of prescribed medication. All questions were answered. Pt voiced understanding and agrees with treatment plan. 3.  ELIDIA-requested  4. Monitor B/P and follow up with PMD if it persists to be elevated.   5.

## 2019-08-22 ENCOUNTER — OFFICE VISIT (OUTPATIENT)
Dept: NEUROLOGY | Age: 29
End: 2019-08-22
Payer: COMMERCIAL

## 2019-08-22 VITALS
WEIGHT: 273 LBS | DIASTOLIC BLOOD PRESSURE: 100 MMHG | HEIGHT: 65 IN | BODY MASS INDEX: 45.48 KG/M2 | HEART RATE: 90 BPM | OXYGEN SATURATION: 98 % | SYSTOLIC BLOOD PRESSURE: 138 MMHG

## 2019-08-22 DIAGNOSIS — G50.0 TRIGEMINAL NEURALGIA OF RIGHT SIDE OF FACE: Primary | ICD-10-CM

## 2019-08-22 PROCEDURE — 99213 OFFICE O/P EST LOW 20 MIN: CPT | Performed by: PHYSICIAN ASSISTANT

## 2019-08-22 RX ORDER — BACLOFEN 10 MG/1
10 TABLET ORAL 3 TIMES DAILY
Qty: 90 TABLET | Refills: 2 | Status: SHIPPED | OUTPATIENT
Start: 2019-08-22 | End: 2019-12-12 | Stop reason: SDUPTHER

## 2019-08-22 NOTE — PATIENT INSTRUCTIONS
Patient Education        Trigeminal Neuralgia: Care Instructions  Your Care Instructions    Trigeminal neuralgia is a problem with the large nerve that brings feeling to your face. It causes a sudden, sharp pain on one side of your face. Just touching your cheek or talking can set off shooting pain toward the ear, eye, or nostril. Living with this pain can be very hard. Some people have long periods when they do not have pain, and then it comes back. Some people have periods of pain often. But medicine or other treatment often can make the pain go away. If you keep having pain, surgery may help. This problem is also called tic douloureux (say \"tik doo-ricardo-ALEXIS\"). Follow-up care is a key part of your treatment and safety. Be sure to make and go to all appointments, and call your doctor if you are having problems. It's also a good idea to know your test results and keep a list of the medicines you take. How can you care for yourself at home? · Write down when you have pain and what you were doing when it started. Try to find what causes the pain. Being in a cold wind, yawning, or shaving are examples. Avoid or limit these triggers if you can. · Be safe with medicines. Take your medicines exactly as prescribed. ? Your doctor may have prescribed medicines used to treat depression and seizures. They can reduce your pain, help you sleep better, and improve your mood. ? Call your doctor if you think you are having a problem with your medicine. You will get more details on the specific medicines your doctor prescribes. ? If you are not taking a prescription pain medicine, take an over-the-counter medicine such as acetaminophen (Tylenol), ibuprofen (Advil, Motrin), or naproxen (Aleve). Read and follow all instructions on the label. ? Do not take two or more pain medicines at the same time unless the doctor told you to. Many pain medicines have acetaminophen, which is Tylenol.  Too much acetaminophen (Tylenol) can be

## 2019-09-09 RX ORDER — OXCARBAZEPINE 600 MG/1
TABLET ORAL
Qty: 90 TABLET | Refills: 0 | Status: SHIPPED | OUTPATIENT
Start: 2019-09-09 | End: 2019-10-17 | Stop reason: SDUPTHER

## 2019-10-18 RX ORDER — OXCARBAZEPINE 600 MG/1
TABLET ORAL
Qty: 90 TABLET | Refills: 3 | Status: SHIPPED | OUTPATIENT
Start: 2019-10-18 | End: 2020-04-20 | Stop reason: ALTCHOICE

## 2019-10-22 DIAGNOSIS — G50.0 TRIGEMINAL NEURALGIA OF RIGHT SIDE OF FACE: ICD-10-CM

## 2019-10-22 RX ORDER — HYDROCODONE BITARTRATE AND ACETAMINOPHEN 10; 325 MG/1; MG/1
1 TABLET ORAL EVERY 8 HOURS PRN
Qty: 90 TABLET | Refills: 0 | Status: SHIPPED | OUTPATIENT
Start: 2019-10-22 | End: 2019-11-21

## 2019-10-29 DIAGNOSIS — Z30.012 ENCOUNTER FOR PRESCRIPTION OF EMERGENCY CONTRACEPTION: ICD-10-CM

## 2019-11-26 ENCOUNTER — OFFICE VISIT (OUTPATIENT)
Dept: NEUROLOGY | Age: 29
End: 2019-11-26
Payer: COMMERCIAL

## 2019-11-26 ENCOUNTER — TELEPHONE (OUTPATIENT)
Dept: NEUROLOGY | Age: 29
End: 2019-11-26

## 2019-11-26 VITALS
BODY MASS INDEX: 45.32 KG/M2 | RESPIRATION RATE: 14 BRPM | SYSTOLIC BLOOD PRESSURE: 133 MMHG | HEIGHT: 65 IN | HEART RATE: 98 BPM | WEIGHT: 272 LBS | DIASTOLIC BLOOD PRESSURE: 81 MMHG

## 2019-11-26 DIAGNOSIS — G50.0 TRIGEMINAL NEURALGIA OF RIGHT SIDE OF FACE: Primary | ICD-10-CM

## 2019-11-26 PROCEDURE — 99214 OFFICE O/P EST MOD 30 MIN: CPT | Performed by: PSYCHIATRY & NEUROLOGY

## 2019-11-26 RX ORDER — HYDROCODONE BITARTRATE AND ACETAMINOPHEN 10; 325 MG/1; MG/1
1 TABLET ORAL EVERY 8 HOURS PRN
COMMUNITY
End: 2020-01-07 | Stop reason: SDUPTHER

## 2019-11-26 RX ORDER — METHYLPREDNISOLONE 4 MG/1
TABLET ORAL
Qty: 1 KIT | Refills: 0 | Status: SHIPPED | OUTPATIENT
Start: 2019-11-26 | End: 2019-12-02

## 2019-11-26 RX ORDER — PREGABALIN 200 MG/1
200 CAPSULE ORAL 2 TIMES DAILY
Qty: 60 CAPSULE | Refills: 5 | Status: SHIPPED | OUTPATIENT
Start: 2019-11-26 | End: 2020-02-20 | Stop reason: SDUPTHER

## 2019-11-26 RX ORDER — METHYLPREDNISOLONE SODIUM SUCCINATE 40 MG/ML
80 INJECTION, POWDER, LYOPHILIZED, FOR SOLUTION INTRAMUSCULAR; INTRAVENOUS ONCE
Status: COMPLETED | OUTPATIENT
Start: 2019-11-27 | End: 2019-11-27

## 2019-11-26 RX ORDER — DULOXETIN HYDROCHLORIDE 30 MG/1
30 CAPSULE, DELAYED RELEASE ORAL DAILY
Qty: 30 CAPSULE | Refills: 5 | Status: SHIPPED | OUTPATIENT
Start: 2019-11-26 | End: 2020-04-20

## 2019-11-27 RX ADMIN — METHYLPREDNISOLONE SODIUM SUCCINATE 80 MG: 40 INJECTION, POWDER, LYOPHILIZED, FOR SOLUTION INTRAMUSCULAR; INTRAVENOUS at 13:41

## 2019-12-11 DIAGNOSIS — Z30.012 ENCOUNTER FOR PRESCRIPTION OF EMERGENCY CONTRACEPTION: ICD-10-CM

## 2019-12-12 DIAGNOSIS — Z30.012 ENCOUNTER FOR PRESCRIPTION OF EMERGENCY CONTRACEPTION: ICD-10-CM

## 2019-12-12 RX ORDER — NORETHINDRONE ACETATE AND ETHINYL ESTRADIOL, ETHINYL ESTRADIOL AND FERROUS FUMARATE 1MG-10(24)
KIT ORAL
Qty: 28 TABLET | Refills: 6 | Status: SHIPPED | OUTPATIENT
Start: 2019-12-12 | End: 2022-12-19

## 2019-12-12 RX ORDER — BACLOFEN 10 MG/1
10 TABLET ORAL 3 TIMES DAILY
Qty: 90 TABLET | Refills: 2 | Status: SHIPPED | OUTPATIENT
Start: 2019-12-12 | End: 2020-04-07 | Stop reason: SDUPTHER

## 2020-01-07 RX ORDER — HYDROCODONE BITARTRATE AND ACETAMINOPHEN 10; 325 MG/1; MG/1
1 TABLET ORAL EVERY 8 HOURS PRN
Qty: 90 TABLET | Refills: 0 | Status: SHIPPED | OUTPATIENT
Start: 2020-01-07 | End: 2020-02-06

## 2020-01-18 ENCOUNTER — HOSPITAL ENCOUNTER (EMERGENCY)
Age: 30
Discharge: HOME | End: 2020-01-18
Payer: COMMERCIAL

## 2020-01-18 VITALS
DIASTOLIC BLOOD PRESSURE: 89 MMHG | TEMPERATURE: 98.2 F | RESPIRATION RATE: 16 BRPM | HEIGHT: 65 IN | SYSTOLIC BLOOD PRESSURE: 146 MMHG | OXYGEN SATURATION: 96 % | WEIGHT: 272 LBS | BODY MASS INDEX: 45.32 KG/M2 | HEART RATE: 71 BPM

## 2020-01-18 LAB
ALBUMIN SERPL-MCNC: 4.8 G/DL (ref 3.5–5.2)
ALP BLD-CCNC: 67 U/L (ref 35–104)
ALT SERPL-CCNC: 45 U/L (ref 5–33)
ANION GAP SERPL CALCULATED.3IONS-SCNC: 14 MMOL/L (ref 7–19)
AST SERPL-CCNC: 32 U/L (ref 5–32)
BASOPHILS ABSOLUTE: 0 K/UL (ref 0–0.2)
BASOPHILS RELATIVE PERCENT: 0.3 % (ref 0–1)
BILIRUB SERPL-MCNC: 0.4 MG/DL (ref 0.2–1.2)
BUN BLDV-MCNC: 5 MG/DL (ref 6–20)
CALCIUM SERPL-MCNC: 9.7 MG/DL (ref 8.6–10)
CHLORIDE BLD-SCNC: 101 MMOL/L (ref 98–111)
CO2: 23 MMOL/L (ref 22–29)
CREAT SERPL-MCNC: 0.6 MG/DL (ref 0.5–0.9)
EOSINOPHILS ABSOLUTE: 0 K/UL (ref 0–0.6)
EOSINOPHILS RELATIVE PERCENT: 0.4 % (ref 0–5)
GFR NON-AFRICAN AMERICAN: >60
GLUCOSE BLD-MCNC: 133 MG/DL (ref 74–109)
HCT VFR BLD CALC: 50.6 % (ref 37–47)
HEMOGLOBIN: 17.7 G/DL (ref 12–16)
IMMATURE GRANULOCYTES #: 0 K/UL
LYMPHOCYTES ABSOLUTE: 1.8 K/UL (ref 1.1–4.5)
LYMPHOCYTES RELATIVE PERCENT: 18.9 % (ref 20–40)
MCH RBC QN AUTO: 30.2 PG (ref 27–31)
MCHC RBC AUTO-ENTMCNC: 35 G/DL (ref 33–37)
MCV RBC AUTO: 86.2 FL (ref 81–99)
MONOCYTES ABSOLUTE: 0.4 K/UL (ref 0–0.9)
MONOCYTES RELATIVE PERCENT: 3.9 % (ref 0–10)
NEUTROPHILS ABSOLUTE: 7.4 K/UL (ref 1.5–7.5)
NEUTROPHILS RELATIVE PERCENT: 76.2 % (ref 50–65)
PDW BLD-RTO: 13.3 % (ref 11.5–14.5)
PLATELET # BLD: 318 K/UL (ref 130–400)
PMV BLD AUTO: 9.8 FL (ref 9.4–12.3)
POTASSIUM SERPL-SCNC: 4 MMOL/L (ref 3.5–5)
RAPID INFLUENZA  B AGN: NEGATIVE
RAPID INFLUENZA A AGN: NEGATIVE
RBC # BLD: 5.87 M/UL (ref 4.2–5.4)
SODIUM BLD-SCNC: 138 MMOL/L (ref 136–145)
TOTAL PROTEIN: 7.9 G/DL (ref 6.6–8.7)
WBC # BLD: 9.8 K/UL (ref 4.8–10.8)

## 2020-01-18 PROCEDURE — 6360000002 HC RX W HCPCS: Performed by: NURSE PRACTITIONER

## 2020-01-18 PROCEDURE — 2580000003 HC RX 258: Performed by: NURSE PRACTITIONER

## 2020-01-18 PROCEDURE — 96375 TX/PRO/DX INJ NEW DRUG ADDON: CPT

## 2020-01-18 PROCEDURE — 85025 COMPLETE CBC W/AUTO DIFF WBC: CPT

## 2020-01-18 PROCEDURE — 80053 COMPREHEN METABOLIC PANEL: CPT

## 2020-01-18 PROCEDURE — 36415 COLL VENOUS BLD VENIPUNCTURE: CPT

## 2020-01-18 PROCEDURE — 87804 INFLUENZA ASSAY W/OPTIC: CPT

## 2020-01-18 PROCEDURE — 96374 THER/PROPH/DIAG INJ IV PUSH: CPT

## 2020-01-18 PROCEDURE — 99283 EMERGENCY DEPT VISIT LOW MDM: CPT

## 2020-01-18 RX ORDER — PREDNISONE 20 MG/1
20 TABLET ORAL 2 TIMES DAILY
Qty: 10 TABLET | Refills: 0 | Status: SHIPPED | OUTPATIENT
Start: 2020-01-18 | End: 2020-01-23

## 2020-01-18 RX ORDER — 0.9 % SODIUM CHLORIDE 0.9 %
1000 INTRAVENOUS SOLUTION INTRAVENOUS ONCE
Status: COMPLETED | OUTPATIENT
Start: 2020-01-18 | End: 2020-01-18

## 2020-01-18 RX ORDER — ONDANSETRON 2 MG/ML
4 INJECTION INTRAMUSCULAR; INTRAVENOUS ONCE
Status: COMPLETED | OUTPATIENT
Start: 2020-01-18 | End: 2020-01-18

## 2020-01-18 RX ORDER — ONDANSETRON 4 MG/1
4 TABLET, ORALLY DISINTEGRATING ORAL EVERY 8 HOURS PRN
Qty: 15 TABLET | Refills: 0 | Status: SHIPPED | OUTPATIENT
Start: 2020-01-18 | End: 2020-04-20 | Stop reason: ALTCHOICE

## 2020-01-18 RX ORDER — METHYLPREDNISOLONE SODIUM SUCCINATE 125 MG/2ML
125 INJECTION, POWDER, LYOPHILIZED, FOR SOLUTION INTRAMUSCULAR; INTRAVENOUS ONCE
Status: COMPLETED | OUTPATIENT
Start: 2020-01-18 | End: 2020-01-18

## 2020-01-18 RX ADMIN — SODIUM CHLORIDE 1000 ML: 9 INJECTION, SOLUTION INTRAVENOUS at 17:30

## 2020-01-18 RX ADMIN — METHYLPREDNISOLONE SODIUM SUCCINATE 125 MG: 125 INJECTION, POWDER, FOR SOLUTION INTRAMUSCULAR; INTRAVENOUS at 17:20

## 2020-01-18 RX ADMIN — ONDANSETRON 4 MG: 2 INJECTION INTRAMUSCULAR; INTRAVENOUS at 17:20

## 2020-01-18 RX ADMIN — HYDROMORPHONE HYDROCHLORIDE 0.5 MG: 1 INJECTION, SOLUTION INTRAMUSCULAR; INTRAVENOUS; SUBCUTANEOUS at 17:20

## 2020-01-18 ASSESSMENT — PAIN SCALES - GENERAL
PAINLEVEL_OUTOF10: 10
PAINLEVEL_OUTOF10: 10
PAINLEVEL_OUTOF10: 4

## 2020-01-18 NOTE — ED PROVIDER NOTES
[facility]  eMERGENCY dEPARTMENT eNCOUnter      Pt Name: Vinh Figueroa  MRN: [id number]  [place] 1990  Date of evaluation: 2020  Provider: Jacinta Escobar, [address]       Chief Complaint   Patient presents with    Facial Pain     hx of trigeminal neuralgia         HISTORY OF PRESENT ILLNESS   (Location/Symptom, Timing/Onset, Context/Setting, Quality, Duration, Modifying Factors, Severity)  Note limiting factors. Vinh Figueroa is a 34 y.o. female who presents to the emergency department with r sided facial pain. Is chronic in nature. Sees Dr Rory Asher and takes several meds for it. HAs just gotten worse in the last 3-4 days. NOt eating because it hurts to open her mouth. No vomiting  No fever    The history is provided by the patient. Facial Injury   Injury mechanism: none. Location:  Face, R cheek and mouth  Time since incident:  4 days  Pain details:     Quality:  Aching    Severity:  Severe    Duration:  4 days    Timing:  Intermittent    Progression:  Worsening  Ineffective treatments:  Prescription drugs  Associated symptoms: headaches    Associated symptoms: no altered mental status, no congestion, no difficulty breathing, no double vision, no ear pain, no loss of consciousness, no trismus and no vomiting        Nursing Notes were reviewed. REVIEW OF SYSTEMS    (2-9 systems for level 4, 10 or more for level 5)     Review of Systems   HENT: Negative for congestion and ear pain. Eyes: Negative for double vision. Gastrointestinal: Negative for vomiting. Neurological: Positive for headaches. Negative for loss of consciousness. Except as noted above the remainder of the review ofsystems was reviewed and negative.        PAST MEDICAL HISTORY     Past Medical History:   Diagnosis Date    Trigeminal neuralgia     Right         SURGICAL HISTORY       Past Surgical History:   Procedure Laterality Date     SECTION  2018    FACIAL NERVE SURGERY radiosurgery for trigeminal neuralgia    TONSILLECTOMY AND ADENOIDECTOMY      WISDOM TOOTH EXTRACTION           CURRENT MEDICATIONS       Discharge Medication List as of 1/18/2020  6:51 PM      CONTINUE these medications which have NOT CHANGED    Details   HYDROcodone-acetaminophen (NORCO)  MG per tablet Take 1 tablet by mouth every 8 hours as needed for Pain for up to 30 days. , Disp-90 tablet, R-0Normal      baclofen (LIORESAL) 10 MG tablet Take 1 tablet by mouth 3 times daily, Disp-90 tablet, R-2Normal      pregabalin (LYRICA) 200 MG capsule Take 1 capsule by mouth 2 times daily for 180 days. , Disp-60 capsule, R-5Normal      DULoxetine (CYMBALTA) 30 MG extended release capsule Take 1 capsule by mouth daily, Disp-30 capsule, R-5Normal      OXTELLAR  MG TB24 TAKE (3) TABLETS BY MOUTH EVERY DAY, Disp-90 tablet, R-3, IRVIN$Normal      norethindrone-ethinyl estradiol-Fe (LO LOESTRIN FE) 1 MG-10 MCG / 10 MCG tablet Take 1 tablet by mouthHistorical Med             ALLERGIES     Motrin [ibuprofen]    FAMILY HISTORY     No family history on file.        SOCIAL HISTORY       Social History     Socioeconomic History    Marital status:      Spouse name: Not on file    Number of children: Not on file    Years of education: Not on file    Highest education level: Not on file   Occupational History    Not on file   Social Needs    Financial resource strain: Not on file    Food insecurity:     Worry: Not on file     Inability: Not on file    Transportation needs:     Medical: Not on file     Non-medical: Not on file   Tobacco Use    Smoking status: Never Smoker    Smokeless tobacco: Never Used   Substance and Sexual Activity    Alcohol use: No     Alcohol/week: 0.0 standard drinks    Drug use: No    Sexual activity: Not on file   Lifestyle    Physical activity:     Days per week: Not on file     Minutes per session: Not on file    Stress: Not on file   Relationships    Social connections: Neutrophils % 76.2 (*)     Lymphocytes % 18.9 (*)     All other components within normal limits   COMPREHENSIVE METABOLIC PANEL - Abnormal; Notable for the following components:    Glucose 133 (*)     BUN 5 (*)     ALT 45 (*)     All other components within normal limits   RAPID INFLUENZA A/B ANTIGENS         EMERGENCY DEPARTMENT COURSE and DIFFERENTIAL DIAGNOSIS/MDM:   Vitals:    Vitals:    01/18/20 1643 01/18/20 1904   BP: (!) 141/99 (!) 146/89   Pulse: 88 71   Resp: 16 16   Temp: 98.2 °F (36.8 °C)    TempSrc: Oral    SpO2: 96%    Weight: 272 lb (123.4 kg)    Height: 5' 5\" (1.651 m)            MDM  Better after treatment. Recommended follow up with [facility]      CONSULTS:  None    PROCEDURES:  Unless otherwise noted below, none     Procedures    FINAL IMPRESSION      1. Trigeminal neuralgia of right side of face          DISPOSITION/PLAN   DISPOSITION Decision To Discharge    PATIENT REFERRED TO:  [facility]  [address] [id number]  [phone]            DISCHARGE MEDICATIONS:  Discharge Medication List as of 1/18/2020  6:51 PM      START taking these medications    Details   ondansetron (ZOFRAN ODT) 4 MG disintegrating tablet Take 1 tablet by mouth every 8 hours as needed for Nausea or Vomiting, Disp-15 tablet, R-0Print      predniSONE (DELTASONE) 20 MG tablet Take 1 tablet by mouth 2 times daily for 5 days, Disp-10 tablet, R-0Print           Attestation: The Prescription Monitoring Report for this patient was reviewed today. ([id number]) JESSY Pham)    (Please notethat portions of this note were completed with a voice recognition program.  Efforts were made to edit the dictations but occasionally words are mis-transcribed.)    JESSY Pham (electronically signed)          JESSY Pham  01/19/20 [phone]

## 2020-01-19 ASSESSMENT — ENCOUNTER SYMPTOMS
TRISMUS: 0
DOUBLE VISION: 0
DIFFICULTY BREATHING: 0
VOMITING: 0

## 2020-02-03 ENCOUNTER — OFFICE VISIT (OUTPATIENT)
Dept: NEUROLOGY | Age: 30
End: 2020-02-03
Payer: COMMERCIAL

## 2020-02-03 VITALS
WEIGHT: 270 LBS | SYSTOLIC BLOOD PRESSURE: 137 MMHG | DIASTOLIC BLOOD PRESSURE: 88 MMHG | HEART RATE: 82 BPM | HEIGHT: 65 IN | RESPIRATION RATE: 16 BRPM | BODY MASS INDEX: 44.98 KG/M2

## 2020-02-03 LAB
AMPHETAMINE SCREEN, URINE: NORMAL
BARBITURATE SCREEN, URINE: NORMAL
BENZODIAZEPINE SCREEN, URINE: NORMAL
BUPRENORPHINE URINE: NORMAL
COCAINE METABOLITE SCREEN URINE: NORMAL
GABAPENTIN SCREEN, URINE: NORMAL
MDMA URINE: NORMAL
METHADONE SCREEN, URINE: NORMAL
METHAMPHETAMINE, URINE: NORMAL
OPIATE SCREEN URINE: NORMAL
OXYCODONE SCREEN URINE: NORMAL
PHENCYCLIDINE SCREEN URINE: NORMAL
PROPOXYPHENE SCREEN, URINE: NORMAL
THC SCREEN, URINE: NORMAL
TRICYCLIC ANTIDEPRESSANTS, UR: NORMAL

## 2020-02-03 PROCEDURE — 80305 DRUG TEST PRSMV DIR OPT OBS: CPT | Performed by: PSYCHIATRY & NEUROLOGY

## 2020-02-03 PROCEDURE — 99214 OFFICE O/P EST MOD 30 MIN: CPT | Performed by: PSYCHIATRY & NEUROLOGY

## 2020-02-03 RX ORDER — DULOXETIN HYDROCHLORIDE 60 MG/1
60 CAPSULE, DELAYED RELEASE ORAL 2 TIMES DAILY
Qty: 60 CAPSULE | Refills: 5 | Status: SHIPPED | OUTPATIENT
Start: 2020-02-03 | End: 2020-07-30

## 2020-02-03 RX ORDER — OXYCODONE HCL 10 MG/1
10 TABLET, FILM COATED, EXTENDED RELEASE ORAL EVERY 8 HOURS PRN
Qty: 90 TABLET | Refills: 0 | Status: SHIPPED | OUTPATIENT
Start: 2020-02-03 | End: 2020-02-05

## 2020-02-03 NOTE — PROGRESS NOTES
[id number] [facility]  [address] [place] [address] Drive, [address]  [place], [address]  Phone [phone]  Fax [phone] [address] [facility] Follow Up Encounter  2/3/2020    Information:   Patient Name: Joon Bermudez  :   1990  Age:   34 y.o. MRN:   [id number]  Account #:  [de-identified]  Today:  2/3/20    Provider: Dejah Yip M.D. Chief Complaint:   Chief Complaint   Patient presents with    Follow-up       Subjective:   Joon Bermudez is a 34 y.o. woman with a history of right trigeminal neuralgia who is following up. She has had gamma knife radiosurgery. She has been on Tegretol, gabapentin, Lyrica, Trileptal, Oxtellar XR, Baclofen, Cymbalta, and Norco. She is presently on Lyrica 200 mg BID, Oxtellar XR 1800 mg daily, Cymbalta 30 mg daily, and Baclofen 10 mg TID. She continues to have frequent right facial pain, dysesthesia, and allodynia. She has jabs of pain and electrical pain in right face multiple times a day. Sometimes the sharp pain lasts minutes. Objective:     Past Medical History:  Past Medical History:   Diagnosis Date    Trigeminal neuralgia     Right       Past Surgical History:   Procedure Laterality Date     SECTION  2018    FACIAL NERVE SURGERY      radiosurgery for trigeminal neuralgia    TONSILLECTOMY AND ADENOIDECTOMY      WISDOM TOOTH EXTRACTION         Recent Hospitalizations  · None    Significant Injuries  · None    Habits  Joon Bermudez reports that she has never smoked. She has never used smokeless tobacco. She reports that she does not drink alcohol or use drugs. History reviewed. No pertinent family history. Social History  Lniette Olivo , lives in [place] [address] [address], and works at a bank.     Medications:  Current Outpatient Medications   Medication Sig Dispense Refill    DULoxetine (CYMBALTA) 60 MG extended release capsule Take 1 capsule by mouth 2 times daily 60 capsule 5    baclofen (LIORESAL) 10 MG tablet Take 1 tablet by mouth 3 times daily 90 tablet 2    pregabalin (LYRICA) 200 MG capsule Take 1 capsule by mouth 2 times daily for 180 days. 60 capsule 5    DULoxetine (CYMBALTA) 30 MG extended release capsule Take 1 capsule by mouth daily 30 capsule 5    OXTELLAR  MG TB24 TAKE (3) TABLETS BY MOUTH EVERY DAY 90 tablet 3    norethindrone-ethinyl estradiol-Fe (LO LOESTRIN FE) 1 MG-10 MCG / 10 MCG tablet Take 1 tablet by mouth      oxyCODONE-acetaminophen (PERCOCET)  MG per tablet Take 1 tablet by mouth every 8 hours as needed for Pain for up to 30 days. Intended supply: 30 days 90 tablet 0    ondansetron (ZOFRAN ODT) 4 MG disintegrating tablet Take 1 tablet by mouth every 8 hours as needed for Nausea or Vomiting (Patient not taking: Reported on 2/3/2020) 15 tablet 0     No current facility-administered medications for this visit. Allergies:   Allergies as of 02/03/2020 - Review Complete 02/03/2020   Allergen Reaction Noted    Motrin [ibuprofen]  05/27/2016       Review of Systems:  General ROS: negative for - chills or fever  Psychological ROS: negative for - anxiety or depression  ENT ROS: negative for - headaches or sinus pain  Hematological and Lymphatic ROS: negative for - bleeding problems, bruising or swollen lymph nodes  Respiratory ROS: negative for - cough, hemoptysis or shortness of breath  Cardiovascular ROS: negative for - chest pain or palpitations  Gastrointestinal ROS: negative for - blood in stools, constipation, diarrhea or nausea/vomiting  Genito-Urinary ROS: negative for - hematuria or urinary frequency/urgency  Musculoskeletal ROS: negative for - joint pain, joint stiffness or joint swelling  Neurological ROS: negative for - memory loss,weakness     Examination:  Vitals:  /88   Pulse 82   Resp 16   Ht 5' 5\" (1.651 m)   Wt 270 lb (122.5 kg)   BMI 44.93 kg/m²   General appearance:  alert and cooperative with exam  HEENT:  Sclera clear, anicteric, Oropharynx clear, no lesions, Neck

## 2020-02-03 NOTE — PATIENT INSTRUCTIONS
INSTRUCTIONS:  1. Taper off the Trileptil by taking 2 capsules a day for a week, then 1 a day for a week, then stop it  2. Increase the Cymbalta by taking 60 mg daily for a week, then 60 mg twice a day  3. Change the hydrocodone to oxycodone as needed for pain  4.  Will arrange an MRI head

## 2020-02-04 ENCOUNTER — TELEPHONE (OUTPATIENT)
Dept: NEUROLOGY | Age: 30
End: 2020-02-04

## 2020-02-04 NOTE — TELEPHONE ENCOUNTER
Sent a message to patient via my chart to inform that the Prior Authorization For Oxy has been approved. See additional information in Media.

## 2020-02-05 ENCOUNTER — TELEPHONE (OUTPATIENT)
Dept: NEUROLOGY | Age: 30
End: 2020-02-05

## 2020-02-05 RX ORDER — OXYCODONE HCL 10 MG/1
10 TABLET, FILM COATED, EXTENDED RELEASE ORAL EVERY 8 HOURS PRN
Qty: 90 TABLET | Refills: 0 | Status: SHIPPED | OUTPATIENT
Start: 2020-02-05 | End: 2020-02-05

## 2020-02-05 NOTE — TELEPHONE ENCOUNTER
I canceled the other scripts sent to the pharmacy and set up the correct script that you want her to have.

## 2020-02-05 NOTE — TELEPHONE ENCOUNTER
Called insurance patient has been unable to fill the Medication at the Pharmacy.     ER medication(oxyCODONE (OXYCONTIN) 10 MG extended release tablet)     was sent in but IR was approved

## 2020-02-05 NOTE — TELEPHONE ENCOUNTER
Pharmacy said to medication wont go through unless it is name brand. I teed up a new script. Will you please sign off since Dr. Brian Morales is not in office today?

## 2020-02-06 ENCOUNTER — TELEPHONE (OUTPATIENT)
Dept: NEUROLOGY | Age: 30
End: 2020-02-06

## 2020-02-06 ENCOUNTER — HOSPITAL ENCOUNTER (OUTPATIENT)
Dept: MRI IMAGING | Age: 30
Discharge: HOME | End: 2020-02-06
Payer: COMMERCIAL

## 2020-02-06 PROCEDURE — 70551 MRI BRAIN STEM W/O DYE: CPT

## 2020-02-06 RX ORDER — OXYCODONE AND ACETAMINOPHEN 10; 325 MG/1; MG/1
1 TABLET ORAL EVERY 8 HOURS PRN
Qty: 90 TABLET | Refills: 0 | Status: SHIPPED | OUTPATIENT
Start: 2020-02-06 | End: 2020-03-17 | Stop reason: SDUPTHER

## 2020-02-06 NOTE — TELEPHONE ENCOUNTER
Called and informed the spouse that the Prior Authorization has been approved from 2/6/2020-5/6/2020. See additional information in Media .

## 2020-02-07 ENCOUNTER — PATIENT MESSAGE (OUTPATIENT)
Dept: NEUROLOGY | Age: 30
End: 2020-02-07

## 2020-02-07 ENCOUNTER — TELEPHONE (OUTPATIENT)
Dept: NEUROLOGY | Age: 30
End: 2020-02-07

## 2020-02-07 NOTE — TELEPHONE ENCOUNTER
Patient asked if something is sent in that it be sent to [facility].

## 2020-02-07 NOTE — TELEPHONE ENCOUNTER
From: Joon Bermudez  To: Marshall Curtis MD  Sent: 2/7/2020 4:40 AM CST  Subject: Prescription Question    I was wondering if Dr Petra Perdomo would be willing to call in a steroid script for me. Since I was in on Monday Ive gone into a worse flair with my TN. Wednesday evening I started having attacks with burning in my bottom right jaw, and the sever electrical pain through my cheek eye and forehead. The attacks are coming every 5-10 min and lasting up to a minute or more. My entire right side of my face is swollen and I cant fully open my right eye. This will be my second missed day of work. The last time he gave me steroids it helped as well as when I was in the ER last month over a weekend when the office was closed.  I really dont want to have to go back into the ER if at all possible this time

## 2020-02-10 ENCOUNTER — TELEPHONE (OUTPATIENT)
Dept: NEUROLOGY | Age: 30
End: 2020-02-10

## 2020-02-10 RX ORDER — METHYLPREDNISOLONE 4 MG/1
TABLET ORAL
Qty: 1 KIT | Refills: 0 | Status: SHIPPED | OUTPATIENT
Start: 2020-02-10 | End: 2020-02-16

## 2020-02-10 NOTE — TELEPHONE ENCOUNTER
Pt's  called asking about pt being seen today or about any medication that can be sent in to help with the flare up. Also, pt is needing a work excuse from 02/10/20 - 02/14/20 sent to [email or web address]. Thank you.

## 2020-02-10 NOTE — TELEPHONE ENCOUNTER
I e-scribed a Medrol Dose pack. If symptoms persist, worsen, fever, chills, or  change in mentation, report to the ER.

## 2020-02-11 ENCOUNTER — TELEPHONE (OUTPATIENT)
Dept: NEUROLOGY | Age: 30
End: 2020-02-11

## 2020-02-11 NOTE — TELEPHONE ENCOUNTER
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 I put in order for a contrasted MRI head and a few labs. She should see an ophthalmologist for as well. I took the liberty of referring her but she can see whom she wants.

## 2020-02-13 ENCOUNTER — HOSPITAL ENCOUNTER (OUTPATIENT)
Dept: MRI IMAGING | Age: 30
Discharge: HOME | End: 2020-02-13
Payer: COMMERCIAL

## 2020-02-13 DIAGNOSIS — D35.2 PITUITARY ADENOMA (HCC): ICD-10-CM

## 2020-02-13 PROCEDURE — 70552 MRI BRAIN STEM W/DYE: CPT

## 2020-02-13 PROCEDURE — 6360000004 HC RX CONTRAST MEDICATION: Performed by: PSYCHIATRY & NEUROLOGY

## 2020-02-13 PROCEDURE — A9577 INJ MULTIHANCE: HCPCS | Performed by: PSYCHIATRY & NEUROLOGY

## 2020-02-13 RX ADMIN — GADOBENATE DIMEGLUMINE 20 ML: 529 INJECTION, SOLUTION INTRAVENOUS at 17:30

## 2020-02-14 ENCOUNTER — TELEPHONE (OUTPATIENT)
Dept: NEUROLOGY | Age: 30
End: 2020-02-14

## 2020-02-14 NOTE — TELEPHONE ENCOUNTER
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 They almost never grow or cause problems.

## 2020-02-15 LAB — PROLACTIN: 14.8 NG/ML (ref 2.8–26)

## 2020-02-20 ENCOUNTER — OFFICE VISIT (OUTPATIENT)
Dept: NEUROLOGY | Age: 30
End: 2020-02-20
Payer: COMMERCIAL

## 2020-02-20 VITALS — SYSTOLIC BLOOD PRESSURE: 140 MMHG | HEART RATE: 122 BPM | DIASTOLIC BLOOD PRESSURE: 100 MMHG

## 2020-02-20 PROCEDURE — 99214 OFFICE O/P EST MOD 30 MIN: CPT | Performed by: PSYCHIATRY & NEUROLOGY

## 2020-02-20 RX ORDER — PREDNISONE 20 MG/1
TABLET ORAL
Qty: 20 TABLET | Refills: 0 | Status: SHIPPED | OUTPATIENT
Start: 2020-02-20 | End: 2020-03-30 | Stop reason: SDUPTHER

## 2020-02-20 RX ORDER — PREGABALIN 200 MG/1
200 CAPSULE ORAL 3 TIMES DAILY
Qty: 90 CAPSULE | Refills: 1 | Status: SHIPPED | OUTPATIENT
Start: 2020-02-20 | End: 2020-04-20 | Stop reason: SDUPTHER

## 2020-02-20 NOTE — PROGRESS NOTES
lives in [place], [place], and works at a bank. Medications:  Current Outpatient Medications   Medication Sig Dispense Refill    oxyCODONE-acetaminophen (PERCOCET)  MG per tablet Take 1 tablet by mouth every 8 hours as needed for Pain for up to 30 days. Intended supply: 30 days 90 tablet 0    DULoxetine (CYMBALTA) 60 MG extended release capsule Take 1 capsule by mouth 2 times daily 60 capsule 5    ondansetron (ZOFRAN ODT) 4 MG disintegrating tablet Take 1 tablet by mouth every 8 hours as needed for Nausea or Vomiting 15 tablet 0    baclofen (LIORESAL) 10 MG tablet Take 1 tablet by mouth 3 times daily 90 tablet 2    pregabalin (LYRICA) 200 MG capsule Take 1 capsule by mouth 2 times daily for 180 days. 60 capsule 5    DULoxetine (CYMBALTA) 30 MG extended release capsule Take 1 capsule by mouth daily 30 capsule 5    OXTELLAR  MG TB24 TAKE (3) TABLETS BY MOUTH EVERY DAY 90 tablet 3    norethindrone-ethinyl estradiol-Fe (LO LOESTRIN FE) 1 MG-10 MCG / 10 MCG tablet Take 1 tablet by mouth       No current facility-administered medications for this visit. Allergies:   Allergies as of 02/20/2020 - Review Complete 02/20/2020   Allergen Reaction Noted    Motrin [ibuprofen]  05/27/2016       Review of Systems:  General ROS: negative for - chills or fever  Psychological ROS: negative for - anxiety or depression  ENT ROS: negative for - headaches or sinus pain  Hematological and Lymphatic ROS: negative for - bleeding problems, bruising or swollen lymph nodes  Respiratory ROS: negative for - cough, hemoptysis or shortness of breath  Cardiovascular ROS: negative for - chest pain or palpitations  Gastrointestinal ROS: negative for - blood in stools, constipation, diarrhea or nausea/vomiting  Genito-Urinary ROS: negative for - hematuria or urinary frequency/urgency  Musculoskeletal ROS: negative for - joint pain, joint stiffness or joint swelling  Neurological ROS: negative for - memory loss, medical treatment options left. May have to consider microvascular decompression sooner than she wants. Plan:   1. Increase the Lyrica 200 mg three times a day. If that does not help, we may have to restart the Oxtellar XR  2. Take the Prednisone 20/20 taper  3.  Continue the other medications    Electronically signed by Kameron Toro MD on 2/20/2020

## 2020-03-17 RX ORDER — OXYCODONE AND ACETAMINOPHEN 10; 325 MG/1; MG/1
1 TABLET ORAL EVERY 8 HOURS PRN
Qty: 90 TABLET | Refills: 0 | Status: SHIPPED | OUTPATIENT
Start: 2020-03-17 | End: 2020-04-12 | Stop reason: SDUPTHER

## 2020-03-25 ENCOUNTER — TELEPHONE (OUTPATIENT)
Dept: NEUROLOGY | Age: 30
End: 2020-03-25

## 2020-03-25 NOTE — TELEPHONE ENCOUNTER
Spoke to patient to reschedule her appointment due to she can't due V.V due to [place] out of [place]. She wants to have a letter so she can return to work on 3-30-20. She is doing better. Fax to [employer] at [phone].

## 2020-03-25 NOTE — LETTER
[address], [address]  [facility], [facility] [address]  Phone [phone]  Fax [phone]     Re:  Tristan Montana       3/27/2020  :  1990  Address: [address] [address]          To Whom It May Concern:    Ms. Tristan Montana is medically released to return to work without restrictions as of 3/30/2020.                                      Radha Rodriguez M.D.

## 2020-03-26 NOTE — TELEPHONE ENCOUNTER
Pt called back wanting an update on if she can be released to go back to work without being seen in our office or if she will need to schedule an appointment. Please advise.

## 2020-04-08 RX ORDER — BACLOFEN 10 MG/1
10 TABLET ORAL 3 TIMES DAILY
Qty: 90 TABLET | Refills: 2 | Status: SHIPPED | OUTPATIENT
Start: 2020-04-08 | End: 2020-07-11 | Stop reason: SDUPTHER

## 2020-04-15 RX ORDER — OXYCODONE AND ACETAMINOPHEN 10; 325 MG/1; MG/1
1 TABLET ORAL EVERY 8 HOURS PRN
Qty: 90 TABLET | Refills: 0 | Status: SHIPPED | OUTPATIENT
Start: 2020-04-15 | End: 2020-05-14 | Stop reason: SDUPTHER

## 2020-04-16 ENCOUNTER — TELEPHONE (OUTPATIENT)
Dept: NEUROLOGY | Age: 30
End: 2020-04-16

## 2020-04-20 ENCOUNTER — TELEMEDICINE (OUTPATIENT)
Dept: NEUROLOGY | Age: 30
End: 2020-04-20
Payer: COMMERCIAL

## 2020-04-20 PROCEDURE — 99213 OFFICE O/P EST LOW 20 MIN: CPT | Performed by: PSYCHIATRY & NEUROLOGY

## 2020-04-20 RX ORDER — BUSPIRONE HYDROCHLORIDE 10 MG/1
10 TABLET ORAL 2 TIMES DAILY
Qty: 60 TABLET | Refills: 5 | Status: SHIPPED | OUTPATIENT
Start: 2020-04-20 | End: 2020-05-20

## 2020-04-20 RX ORDER — PREGABALIN 200 MG/1
200 CAPSULE ORAL 3 TIMES DAILY
Qty: 90 CAPSULE | Refills: 1 | Status: SHIPPED | OUTPATIENT
Start: 2020-04-20 | End: 2020-07-07

## 2020-04-20 NOTE — PROGRESS NOTES
[address] [facility] Neurology  [address] [place] [address] Drive, [address] [facility] [address]  [facility] [address]  Phone [phone]  Fax [phone] [address] [facility] Neurology Virtual Video Follow Up Encounter  20 9:24 AM CDT  The Following was conducted as a Telehealth Evaluation - Audio/Visual (during the [address] emergency)    Information:   Patient Name: Elida Avila  :   1990  Age:   34 y.o. MRN:   [id number]  Account #:  [de-identified]  Today:  20    Provider: Jaimie Lynn M.D. Patient Location: Home  Provider Location: The provider is located at [facility] in [place], [place]  Also Present:      Chief Complaint:   Chief Complaint   Patient presents with    Facial Pain     Video visit    Anxiety       Subjective:   Eliad Avila is a 34 y.o. woman with a history of right trigeminal neuralgia who is following up. She has had gamma knife radiosurgery. Acosta Casillas has been on Tegretol, gabapentin, Lyrica, Trileptal, Oxtellar XR, Baclofen, Cymbalta, and Norco. She is presently on Lyrica 200 mg BID, Cymbalta 60 mg BID, and Baclofen 10 mg TID, and PRN Percocet.  She continues to have frequent right facial pain, dysesthesia, and allodynia.  She has jabs of pain and electrical pain in right face multiple times a day.  Sometimes the sharp pain lasts minutes.  Mostly the right facial pain is precipitated by talking, chewing, any movement. She requests something for anxiety. She denies depression. She went back to work but that seemed to make things worse. She requests more time off.       Objective:     Past Medical History:  Past Medical History:   Diagnosis Date    Trigeminal neuralgia     Right       Past Surgical History:   Procedure Laterality Date     SECTION  2018    FACIAL NERVE SURGERY      radiosurgery for trigeminal neuralgia    TONSILLECTOMY AND ADENOIDECTOMY      WISDOM TOOTH EXTRACTION         Recent Hospitalizations  · None    Significant Injuries  · None    Habits  Chaz Melissa are normal in both upper and lower extremities. Extension nose testing is unimpaired bilaterally. Pertinent Diagnostic Studies:     MRI BRAIN W CONTRAST 2/13/2020 2:53 PM   HISTORY: D35.2   Comparison: Brain MRI dated 2/6/2020     Technique: Multiplanar imaging of the brain was performed in a routine   fashion before and after the intravenous injection of gadolinium   contrast. 20 mL MultiHance IV contrast. Multiphasic coronal post   contrast imaging of the pituitary gland and sella for suspected cystic   pituitary adenoma. Findings: There is an approximately 2.6 x 1.4 cm lesion involving the sella,   suprasellar cistern and extending into the paramedian left middle   cranial fossa adjacent to the greater sphenoid wing (series 801-images   94 through 105). This lesion is quite hyperintense on T2, mimicking   the CSF. On the recent prior exam there were heavily T2-weighted   images which distinguish the lesion from the CSF, and the measurements   are obtained from these prior heavily T2-weighted images. This lesion   is nonenhancing and displays a prominent diffusion restriction, which   are typical features of epidermoid cysts. This lesion appears to   extend into the sella left of midline, displacing the pituitary gland   and pituitary stalk. This accounts for what was thought to be a cyst   on the initial MRI. There is a slight mass effect on the optic chiasm   and the left optic tract as well. This lesion appears closely   associated with the suprasellar portion of the left ICA, the ICA   terminus as well as the proximal M1 segment of the left MCA. I do not   see extension of the lesion into the prepontine space to involve the   cisternal segments of the 5th cranial nerves, and as such I am unsure   if this would contribute to the patient's right trigeminal neuralgia. I do not see a true pituitary adenoma on this exam. The distal carotid   and MCA flow voids appear normal in caliber and symmetric.

## 2020-04-20 NOTE — LETTER
[address]  [facility] [address]  Phone [phone]  Fax [phone]     Re:  Kendrick Pantoja      20  :  1990  Address: [address] [address]          To Whom It May Concern:    Ms. Omid Carrillo is medically impaired for work at the present time. She will be able to return Monday, May 18, 202 without restrictions.                                      Titus Agosto M.D.

## 2020-05-01 ENCOUNTER — HOSPITAL ENCOUNTER (EMERGENCY)
Age: 30
Discharge: HOME | End: 2020-05-01
Attending: EMERGENCY MEDICINE
Payer: COMMERCIAL

## 2020-05-01 VITALS
OXYGEN SATURATION: 98 % | SYSTOLIC BLOOD PRESSURE: 129 MMHG | BODY MASS INDEX: 39.15 KG/M2 | TEMPERATURE: 97.9 F | WEIGHT: 235 LBS | HEIGHT: 65 IN | HEART RATE: 100 BPM | DIASTOLIC BLOOD PRESSURE: 90 MMHG | RESPIRATION RATE: 18 BRPM

## 2020-05-01 PROCEDURE — 96372 THER/PROPH/DIAG INJ SC/IM: CPT

## 2020-05-01 PROCEDURE — 6360000002 HC RX W HCPCS: Performed by: EMERGENCY MEDICINE

## 2020-05-01 PROCEDURE — 2500000003 HC RX 250 WO HCPCS: Performed by: EMERGENCY MEDICINE

## 2020-05-01 PROCEDURE — 99283 EMERGENCY DEPT VISIT LOW MDM: CPT

## 2020-05-01 RX ORDER — HYDROMORPHONE HYDROCHLORIDE 1 MG/ML
1 INJECTION, SOLUTION INTRAMUSCULAR; INTRAVENOUS; SUBCUTANEOUS ONCE
Status: COMPLETED | OUTPATIENT
Start: 2020-05-01 | End: 2020-05-01

## 2020-05-01 RX ORDER — METHYLPREDNISOLONE 4 MG/1
TABLET ORAL
Qty: 1 KIT | Refills: 0 | Status: SHIPPED | OUTPATIENT
Start: 2020-05-01 | End: 2020-05-12

## 2020-05-01 RX ORDER — DEXAMETHASONE SODIUM PHOSPHATE 10 MG/ML
10 INJECTION, SOLUTION INTRAMUSCULAR; INTRAVENOUS ONCE
Status: COMPLETED | OUTPATIENT
Start: 2020-05-01 | End: 2020-05-01

## 2020-05-01 RX ADMIN — HYDROMORPHONE HYDROCHLORIDE 1 MG: 1 INJECTION, SOLUTION INTRAMUSCULAR; INTRAVENOUS; SUBCUTANEOUS at 08:46

## 2020-05-01 RX ADMIN — DEXAMETHASONE SODIUM PHOSPHATE 10 MG: 10 INJECTION, SOLUTION INTRAMUSCULAR; INTRAVENOUS at 08:46

## 2020-05-01 ASSESSMENT — ENCOUNTER SYMPTOMS
ABDOMINAL PAIN: 0
BACK PAIN: 0
VOMITING: 0
DIARRHEA: 0
RHINORRHEA: 0
SORE THROAT: 0
NAUSEA: 0
COUGH: 0
SHORTNESS OF BREATH: 0

## 2020-05-01 ASSESSMENT — PAIN DESCRIPTION - LOCATION: LOCATION: FACE

## 2020-05-01 ASSESSMENT — PAIN SCALES - GENERAL: PAINLEVEL_OUTOF10: 10

## 2020-05-01 ASSESSMENT — PAIN DESCRIPTION - DESCRIPTORS: DESCRIPTORS: SHARP;SHOOTING;BURNING

## 2020-05-01 ASSESSMENT — PAIN DESCRIPTION - PAIN TYPE: TYPE: ACUTE PAIN;NEUROPATHIC PAIN

## 2020-05-01 NOTE — ED PROVIDER NOTES
(BUSPAR) 10 MG TABLET    Take 1 tablet by mouth 2 times daily    DULOXETINE (CYMBALTA) 60 MG EXTENDED RELEASE CAPSULE    Take 1 capsule by mouth 2 times daily    NORETHINDRONE-ETHINYL ESTRADIOL-FE (LO LOESTRIN FE) 1 MG-10 MCG / 10 MCG TABLET    Take 1 tablet by mouth    OXYCODONE-ACETAMINOPHEN (PERCOCET)  MG PER TABLET    Take 1 tablet by mouth every 8 hours as needed for Pain for up to 30 days. Intended supply: 30 days    PREGABALIN (LYRICA) 200 MG CAPSULE    Take 1 capsule by mouth 3 times daily for 270 days. ALLERGIES     Motrin [ibuprofen]    FAMILY HISTORY     History reviewed. No pertinent family history.        SOCIAL HISTORY       Social History     Socioeconomic History    Marital status:      Spouse name: None    Number of children: None    Years of education: None    Highest education level: None   Occupational History    None   Social Needs    Financial resource strain: None    Food insecurity     Worry: None     Inability: None    Transportation needs     Medical: None     Non-medical: None   Tobacco Use    Smoking status: Never Smoker    Smokeless tobacco: Never Used   Substance and Sexual Activity    Alcohol use: No     Alcohol/week: 0.0 standard drinks    Drug use: No    Sexual activity: Yes     Partners: Male   Lifestyle    Physical activity     Days per week: None     Minutes per session: None    Stress: None   Relationships    Social connections     Talks on phone: None     Gets together: None     Attends Cheondo[employer] service: None     Active member of club or organization: None     Attends meetings of clubs or organizations: None     Relationship status: None    Intimate partner violence     Fear of current or ex partner: None     Emotionally abused: None     Physically abused: None     Forced sexual activity: None   Other Topics Concern    None   Social History Narrative    None       SCREENINGS    [place] Coma Scale  Eye Opening: Spontaneous  Best Verbal Response: Oriented  Best Motor Response: Obeys commands  [place] Coma Scale Score: 15        PHYSICAL EXAM    (up to 7 for level 4, 8 or more for level 5)     ED Triage Vitals   BP Temp Temp src Pulse Resp SpO2 Height Weight   -- -- -- -- -- -- -- --       Physical Exam  Vitals signs and nursing note reviewed. Constitutional:       General: She is not in acute distress. Appearance: She is well-developed. HENT:      Head: Normocephalic and atraumatic. Right Ear: External ear normal.      Left Ear: External ear normal.   Eyes:      Conjunctiva/sclera: Conjunctivae normal.   Neck:      Musculoskeletal: Normal range of motion. No neck rigidity. Trachea: No tracheal deviation. Cardiovascular:      Rate and Rhythm: Normal rate and regular rhythm. Heart sounds: Normal heart sounds. No murmur. Pulmonary:      Effort: No respiratory distress. Breath sounds: Normal breath sounds. No wheezing or rales. Abdominal:      Palpations: Abdomen is soft. There is no mass. Tenderness: There is no abdominal tenderness. Musculoskeletal: Normal range of motion. Skin:     General: Skin is warm and dry. Neurological:      General: No focal deficit present. Mental Status: She is alert and oriented to person, place, and time. GCS: GCS eye subscore is 4. GCS verbal subscore is 5. GCS motor subscore is 6. Cranial Nerves: No cranial nerve deficit, dysarthria or facial asymmetry. Sensory: No sensory deficit. Motor: No weakness. Gait: Gait normal.      Comments: Reproducible pain along right side of face   Psychiatric:         Mood and Affect: Mood is anxious. Affect is tearful. DIAGNOSTIC RESULTS       No orders to display       LABS:  Labs Reviewed - No data to display    All other labs were within normal range or not returned as of this dictation.     EMERGENCY DEPARTMENT COURSE and DIFFERENTIAL DIAGNOSIS/MDM:   Vitals:    Vitals:    05/01/20 0824   BP: 120/81

## 2020-05-04 RX ORDER — OXCARBAZEPINE 600 MG/1
TABLET ORAL
Qty: 90 TABLET | Refills: 3 | Status: SHIPPED | OUTPATIENT
Start: 2020-05-04 | End: 2020-10-03

## 2020-05-04 NOTE — TELEPHONE ENCOUNTER
The patient stated she was weaned off of this medication but has noticed the pain being worse since being off of it so she would like to start taking it again. I teed up a new script and will tell the patient how to titrate back up if you are ok with her going back on it?

## 2020-05-11 ENCOUNTER — TELEPHONE (OUTPATIENT)
Dept: NEUROLOGY | Age: 30
End: 2020-05-11

## 2020-05-11 NOTE — TELEPHONE ENCOUNTER
Called patient about getting her appointment for 05-12-20 with Dr. Gigi Hagen changed over to a VV through her mychart. No answer. Left a VM regarding this information and to call our office back.

## 2020-05-12 ENCOUNTER — TELEMEDICINE (OUTPATIENT)
Dept: NEUROLOGY | Age: 30
End: 2020-05-12
Payer: COMMERCIAL

## 2020-05-12 PROBLEM — D35.2 PITUITARY ADENOMA (HCC): Status: ACTIVE | Noted: 2020-05-12

## 2020-05-12 PROCEDURE — 99213 OFFICE O/P EST LOW 20 MIN: CPT | Performed by: PSYCHIATRY & NEUROLOGY

## 2020-05-12 NOTE — PROGRESS NOTES
[facility] Neurology  [address] [place] [address] Drive, [address]  [facility]  Phone [phone]  Fax [phone]     [facility] Neurology Virtual Video Follow Up Encounter  20 3:13 PM CDT  The Following was conducted as a Telehealth Evaluation - Audio/Visual (during the [address] emergency)    Information:   Patient Name: Joce Douglass  :   1990  Age:   34 y.o. MRN:   [id number]  Account #:  [de-identified]  Today:  20    Provider: Oneyda Houston M.D. Patient Location: Home  Provider Location: The provider is located at [facility] in [place], [place]  Also Present:  No one    Chief Complaint:   Chief Complaint   Patient presents with    Follow-up     Patient is a 3mo follow up for trigeminal neuralgia of the right side of face. Subjective:   Joce Douglass is a 34 y.o. woman with a history of right trigeminal neuralgia who is following up. She has had gamma knife radiosurgery. Tiffanie Vu has been on Tegretol, gabapentin, Lyrica, Trileptal, Oxtellar XR, Baclofen, Cymbalta, and Norco. She is presently on Lyrica 200 mg BID, Cymbalta 60 mg BID, and Baclofen 10 mg TID, and PRN Percocet.  She continues to have frequent right facial pain, dysesthesia, and allodynia.  She has jabs of pain and electrical pain in right face multiple times a day.  Sometimes the sharp pain lasts minutes.  Mostly the right facial pain is precipitated by talking, chewing, any movement. She is doing better and would like to go back to work.         Objective:     Past Medical History:  Past Medical History:   Diagnosis Date    Trigeminal neuralgia     Right       Past Surgical History:   Procedure Laterality Date     SECTION  2018    FACIAL NERVE SURGERY      radiosurgery for trigeminal neuralgia    TONSILLECTOMY AND ADENOIDECTOMY      WISDOM TOOTH EXTRACTION         Recent Hospitalizations  · None    Significant Injuries  · None    Habits  Joce Douglass reports that she has never smoked. She has never used smokeless tobacco. She reports that she does not drink alcohol or use drugs. History reviewed. No pertinent family history. Social History  Lawanda Lei , lives in [place], [place], and works at a [employer]. Medications:  Current Outpatient Medications   Medication Sig Dispense Refill    OXTELLAR  MG TB24 Three tablets daily 90 tablet 3    pregabalin (LYRICA) 200 MG capsule Take 1 capsule by mouth 3 times daily for 270 days. 90 capsule 1    busPIRone (BUSPAR) 10 MG tablet Take 1 tablet by mouth 2 times daily 60 tablet 5    oxyCODONE-acetaminophen (PERCOCET)  MG per tablet Take 1 tablet by mouth every 8 hours as needed for Pain for up to 30 days. Intended supply: 30 days 90 tablet 0    baclofen (LIORESAL) 10 MG tablet Take 1 tablet by mouth 3 times daily 90 tablet 2    DULoxetine (CYMBALTA) 60 MG extended release capsule Take 1 capsule by mouth 2 times daily 60 capsule 5    norethindrone-ethinyl estradiol-Fe (LO LOESTRIN FE) 1 MG-10 MCG / 10 MCG tablet Take 1 tablet by mouth       No current facility-administered medications for this visit. Allergies:   Allergies as of 05/12/2020 - Review Complete 05/12/2020   Allergen Reaction Noted    Motrin [ibuprofen]  05/27/2016       Review of Systems:  Constitutional: negative for - chills and fever  Eyes:  negative for - visual disturbance and photophobia  HENMT: negative for - headaches and sinus pain  Respiratory: negative for - cough, hemoptysis, and shortness of breath  Cardiovascular: negative for - chest pain and palpitations  Gastrointestinal: negative for - blood in stools, constipation, diarrhea, nausea, and vomiting  Genito-Urinary: negative for - hematuria, urinary frequency, urinary urgency, and urinary retention  Musculoskeletal: negative for - joint pain, joint stiffness, and joint swelling  Hematological and Lymphatic: negative for - bleeding problems, abnormal bruising, and swollen Ventricles   are nondilated. Approximately 5.5 mm cerebellar tonsillar ectopia at   the foramen magnum.       Impression   Impression:    1. There is a 2.6 cm lesion involving the sella, suprasellar cistern   and left middle cranial fossa along the paramedian greater sphenoid   wing, displaying imaging characteristics of epidermoid cyst. The   sellar component of this epidermoid cyst accounts for what was   initially thought to be a \"cystic pituitary adenoma. \" There is mass   effect on the pituitary gland, pituitary stalk, optic chiasm and left   optic tract although I am unsure if this degree of mass effect is of   any clinical significance. This lesion does not appear to extend   inferiorly into the prepontine space, and it seems unlikely (from the   imaging) that this would be responsible for the patient's right-sided   trigeminal neuralgia symptoms. 2. Mild Chiari I malformation, with just over 5 mm tonsillar ectopia   at the foramen. Ventricles are nondilated. Signed by Dr Shannen Nation on 2/14/2020 8:16 AM       Assessment:       ICD-10-CM    1. Trigeminal neuralgia of right side of face G50.0    2. Pituitary adenoma ([facility] [address] D35.2    I discussed the above with her    Plan:   1. Continue present medications  2. Consider microvascular decompression. That would allow opinion regarding the pituitary tumor as well. 3. FU in 3 months    Pursuant to the emergency declaration under the [facility], 1135 waiver authority and the [employer] Act, this Virtual  Visit was conducted, with patient's consent, to reduce the patient's risk of exposure to COVID-19 and provide continuity of care for an established patient. Services were provided through a video synchronous discussion virtually to substitute for in-person clinic visit.      Electronically signed by Dago Lane MD on 5/12/20

## 2020-05-15 RX ORDER — OXYCODONE AND ACETAMINOPHEN 10; 325 MG/1; MG/1
1 TABLET ORAL EVERY 8 HOURS PRN
Qty: 90 TABLET | Refills: 0 | Status: SHIPPED | OUTPATIENT
Start: 2020-05-15 | End: 2020-06-29 | Stop reason: SDUPTHER

## 2020-06-29 RX ORDER — OXYCODONE AND ACETAMINOPHEN 10; 325 MG/1; MG/1
1 TABLET ORAL EVERY 8 HOURS PRN
Qty: 90 TABLET | Refills: 0 | Status: SHIPPED | OUTPATIENT
Start: 2020-06-29 | End: 2020-07-30 | Stop reason: SDUPTHER

## 2020-07-06 NOTE — TELEPHONE ENCOUNTER
Requested Prescriptions     Pending Prescriptions Disp Refills    pregabalin (LYRICA) 200 MG capsule [Pharmacy Med Name: PREGABALIN 200 MG CAPSULE] 90 capsule 0     Sig: TAKE (1) CAPSULE BY MOUTH THREE TIMES DAILY.        Last Office Visit:  2/20/2020  Next Office Visit:  8/3/2020  Last Medication Refill:  4/20/20 with 1 refill   [facility] Number up to date:4/13/20      *RX updated to reflect   7/6/20 fill date*

## 2020-07-07 RX ORDER — PREGABALIN 200 MG/1
CAPSULE ORAL
Qty: 90 CAPSULE | Refills: 5 | Status: SHIPPED | OUTPATIENT
Start: 2020-07-07 | End: 2021-01-12

## 2020-07-10 ENCOUNTER — TELEPHONE (OUTPATIENT)
Dept: NEUROLOGY | Age: 30
End: 2020-07-10

## 2020-07-10 NOTE — TELEPHONE ENCOUNTER
Patient  called to request letter for patient to not have to wear a mask due to face pain. Instructed to check with PCP due to Dr Bassett Fear out of office.

## 2020-07-11 RX ORDER — BACLOFEN 10 MG/1
10 TABLET ORAL 3 TIMES DAILY
Qty: 90 TABLET | Refills: 2 | Status: SHIPPED | OUTPATIENT
Start: 2020-07-11

## 2020-07-30 RX ORDER — OXYCODONE AND ACETAMINOPHEN 10; 325 MG/1; MG/1
1 TABLET ORAL EVERY 8 HOURS PRN
Qty: 90 TABLET | Refills: 0 | Status: SHIPPED | OUTPATIENT
Start: 2020-07-30 | End: 2020-09-30 | Stop reason: SDUPTHER

## 2020-07-30 NOTE — TELEPHONE ENCOUNTER
Layne Ewing has requested a refill on her medication. Last office visit : 2/20/2020   Next office visit : 8/3/2020   Last medication refill : 6/29/20  Dann Merrill : up to date       Requested Prescriptions     Pending Prescriptions Disp Refills    oxyCODONE-acetaminophen (PERCOCET)  MG per tablet 90 tablet 0     Sig: Take 1 tablet by mouth every 8 hours as needed for Pain for up to 30 days. Intended supply: 30 days           The patient requested this be refilled today due to her insurance expiring tomorrow due to her changing jobs.

## 2020-08-03 ENCOUNTER — OFFICE VISIT (OUTPATIENT)
Dept: NEUROLOGY | Age: 30
End: 2020-08-03

## 2020-08-03 VITALS
DIASTOLIC BLOOD PRESSURE: 105 MMHG | SYSTOLIC BLOOD PRESSURE: 142 MMHG | RESPIRATION RATE: 14 BRPM | BODY MASS INDEX: 39.99 KG/M2 | HEIGHT: 65 IN | HEART RATE: 80 BPM | WEIGHT: 240 LBS

## 2020-08-03 PROCEDURE — 99214 OFFICE O/P EST MOD 30 MIN: CPT | Performed by: PSYCHIATRY & NEUROLOGY

## 2020-08-03 RX ORDER — BACLOFEN 20 MG/1
20 TABLET ORAL 3 TIMES DAILY
Qty: 90 TABLET | Refills: 5 | Status: SHIPPED | OUTPATIENT
Start: 2020-08-03

## 2020-08-03 RX ORDER — PREDNISONE 20 MG/1
TABLET ORAL
Qty: 20 TABLET | Refills: 0 | Status: SHIPPED | OUTPATIENT
Start: 2020-08-03

## 2020-08-03 NOTE — PROGRESS NOTES
[facility]  [address] [place] [address] Drive, [address] [facility] [address]  [facility], [address]  Phone [phone]  Fax [phone]     [facility] Follow Up Encounter  8/3/20 11:38 AM CDT    Information:   Patient Name: Amilcar Salazar  :   1990  Age:   34 y.o. MRN:   [id number]  Account #:  [de-identified]  Today:  8/3/20    Provider: Eric Rodrigez M.D. Chief Complaint:   Chief Complaint   Patient presents with    Follow-up     Trigeminal neuralgia       Subjective:   Amilcar Salazar is a 34 y.o. woman with a history of right trigeminal neuralgia who is following up. She has had gamma knife radiosurgery. Wendie Wyatt has been on Tegretol, gabapentin, Lyrica, Trileptal, Oxtellar XR, Baclofen, Cymbalta, and Norco. She is presently on Lyrica 200 mg BID, Cymbalta 60 mg BID, and Baclofen 10 mg TID, and PRN Percocet.  She continues to have frequent right facial pain, dysesthesia, and allodynia.  She has jabs of pain and electrical pain in right face multiple times a day.  Sometimes the sharp pain lasts minutes.  Mostly the right facial pain is precipitated by talking, chewing, any movement. The last few days, she has had a flair with severe right facial pain. She does not talk much or move the right side of her face. Objective:     Past Medical History:  Past Medical History:   Diagnosis Date    Trigeminal neuralgia     Right       Past Surgical History:   Procedure Laterality Date     SECTION  2018    FACIAL NERVE SURGERY      radiosurgery for trigeminal neuralgia    TONSILLECTOMY AND ADENOIDECTOMY      WISDOM TOOTH EXTRACTION         Recent Hospitalizations  · None    Significant Injuries  · None    Habits  Amilcar Salazar reports that she has never smoked. She has never used smokeless tobacco. She reports that she does not drink alcohol or use drugs. History reviewed. No pertinent family history.     Social History  Monik Collins , lives in [place], [place], and works at [employer].    Medications:  Current Outpatient Medications   Medication Sig Dispense Refill    DULoxetine (CYMBALTA) 60 MG extended release capsule TAKE (1) CAPSULE BY MOUTH TWICE DAILY. 60 capsule 5    oxyCODONE-acetaminophen (PERCOCET)  MG per tablet Take 1 tablet by mouth every 8 hours as needed for Pain for up to 30 days. Intended supply: 30 days 90 tablet 0    baclofen (LIORESAL) 10 MG tablet Take 1 tablet by mouth 3 times daily 90 tablet 2    pregabalin (LYRICA) 200 MG capsule TAKE (1) CAPSULE BY MOUTH THREE TIMES DAILY. 90 capsule 5    OXTELLAR  MG TB24 Three tablets daily 90 tablet 3    norethindrone-ethinyl estradiol-Fe (LO LOESTRIN FE) 1 MG-10 MCG / 10 MCG tablet Take 1 tablet by mouth       No current facility-administered medications for this visit. Allergies:   Allergies as of 08/03/2020 - Review Complete 08/03/2020   Allergen Reaction Noted    Motrin [ibuprofen]  05/27/2016       Review of Systems:  Constitutional: negative for - chills and fever  Eyes:  negative for - visual disturbance and photophobia  HENMT: negative for - headaches and sinus pain  Respiratory: negative for - cough, hemoptysis, and shortness of breath  Cardiovascular: negative for - chest pain and palpitations  Gastrointestinal: negative for - blood in stools, constipation, diarrhea, nausea, and vomiting  Genito-Urinary: negative for - hematuria, urinary frequency, urinary urgency, and urinary retention  Musculoskeletal: negative for - joint pain, joint stiffness, and joint swelling  Hematological and Lymphatic: negative for - bleeding problems, abnormal bruising, and swollen lymph nodes  Endocrine:  negative for - polydipsia and polyphagia  Allergy/Immunology:  negative for - rhinorrhea, sinus congestion, hives  Integument:  negative for - negative for - rash, change in moles, new or changing lesions  Psychological: negative for - anxiety and depression  Neurological: negative for - memory loss, numbness/tingling, and weakness     PHYSICAL EXAMINATION:  Vitals:  BP (!) 142/105   Pulse 80   Resp 14   Ht 5' 5\" (1.651 m)   Wt 240 lb (108.9 kg)   BMI 39.94 kg/m²   General appearance:  She appears uncomfortable and in pain  HEENT:  normocephalic, atraumatic, sclera appear normal, no nasal abnormalities, no rhinorrhea, Ears appear normal, oral mucous membranes are moist without erythema, trachea midline, thyroid is normal, no lymphadenopathy or neck mass. Cardiovascular:  Regular rate and rhythm without murmer. No peripheral edema, No cyanosis or clubbing. No carotid bruits. Pulmonary:  Lungs are clear to auscultation. Breathing appears normal, good expansion, normal effort without use of accessory muscles  Musculoskeletal:  Joints are normal.  No splints, slings, or casts. Spine appears normal with normal posture and range of motion. Integument:  No rash, erythema, or pallor  Psychiatric:  Mood, affect, and behavior appear normal      NEUROLOGIC EXAMINATION:  Mental Status:  alert, oriented to person, place, and time. Speech:  Clear without dysarthria or dysphonia  Language:  Fluent without aphasia  Cranial Nerves:   II Visual fields are full to confrontation   III,IV, VI Extraocular movements are full   VII Facial movements are symmetrical without weakness   VIII Hearing is intact   IX,X Shoulder shrug and head rotation strength are intact   XII No tongue atrophy or fasciculations. Normal tongue protrusion. No tongue weakness  Motor:  Normal strength in both upper and lower extremities. Normal muscle tone and bulk. Deep tendon reflexes are intact and symmetrical in both upper and lower extremities. Blackwood's signs are absent bilaterally. There is no ankle clonus on either side. Sensation:  Sensation is intact to light touch, temperature, and vibration in all extremities. Coordination:  Rapid alternating movements are normal in both upper and lower extremities.   Finger to nose testing is unimpaired bilaterally. Gait:  Normal station and gait. Pertinent Diagnostic Studies:  ELIDIA was appropriate    Assessment:       ICD-10-CM    1. Trigeminal neuralgia of right side of face  G50.0    2. Pituitary adenoma ([facility] [address]  D35.2    I discussed the diagnosis, pathophysiology, symptoms, risks, prognosis, and treatment options of trigeminal neuralgia and pituitary adenoma with Fidelina Jamison. She has failed a great number of treatments and she has been told several times there is only one option left, subcapital craniotomy with microvascular decompression. I explained that I can arrange for her to see a neurosurgeon for this and they can give us an opinion regarding the pituitary tumor as well. Plan:   1. Prednisone 20/20 taper  2. Increase the Baclofen to 20 mg three times a day  3. Taper the Oxtellar by taking 600 mg twice a day for a week, then 600 mg once a day for a week, then 1/2 tablet daily for a week, then stop it.   4. FU in 3 months    Electronically signed by Bunny Ahumada, MD on 8/3/20

## 2020-08-03 NOTE — PATIENT INSTRUCTIONS
INSTRUCTIONS:  1. Prednisone 20/20 taper  2. Increase the Baclofen to 20 mg three times a day  3. Taper the Oxtellar by taking 600 mg twice a day for a week, then 600 mg once a day for a week, then 1/2 tablet daily for a week, then stop it.

## 2020-08-03 NOTE — LETTER
[address]  [place] [address]  Phone [phone]  Fax [phone]     Re:  Kristy Villarreal    20  :  1990  Address: [address] [place] [id number]          To Whom It May Concern:    Ms. Mardella Shone has right trigeminal neuralgia. As a result she sometimes is unable to wear a mask. Even light touches to the cheek can illicit intense neuralgic pain.                                      Daisy Hastings M.D.

## 2020-08-09 ENCOUNTER — HOSPITAL ENCOUNTER (EMERGENCY)
Facility: HOSPITAL | Age: 30
Discharge: HOME | End: 2020-08-09
Attending: EMERGENCY MEDICINE | Admitting: EMERGENCY MEDICINE

## 2020-08-09 VITALS
HEIGHT: 65 IN | TEMPERATURE: 98.4 F | OXYGEN SATURATION: 99 % | SYSTOLIC BLOOD PRESSURE: 140 MMHG | BODY MASS INDEX: 39.99 KG/M2 | WEIGHT: 240 LBS | RESPIRATION RATE: 17 BRPM | DIASTOLIC BLOOD PRESSURE: 94 MMHG | HEART RATE: 81 BPM

## 2020-08-09 DIAGNOSIS — G50.0 TRIGEMINAL NEURALGIA: Primary | ICD-10-CM

## 2020-08-09 PROCEDURE — 25010000002 DEXAMETHASONE PER 1 MG: Performed by: EMERGENCY MEDICINE

## 2020-08-09 PROCEDURE — 99282 EMERGENCY DEPT VISIT SF MDM: CPT

## 2020-08-09 PROCEDURE — 25010000002 HYDROMORPHONE PER 4 MG: Performed by: EMERGENCY MEDICINE

## 2020-08-09 PROCEDURE — 96372 THER/PROPH/DIAG INJ SC/IM: CPT

## 2020-08-09 RX ORDER — HYDROMORPHONE HCL 110MG/55ML
1.5 PATIENT CONTROLLED ANALGESIA SYRINGE INTRAVENOUS ONCE
Status: COMPLETED | OUTPATIENT
Start: 2020-08-09 | End: 2020-08-09

## 2020-08-09 RX ORDER — DEXAMETHASONE SODIUM PHOSPHATE 10 MG/ML
10 INJECTION INTRAMUSCULAR; INTRAVENOUS ONCE
Status: COMPLETED | OUTPATIENT
Start: 2020-08-09 | End: 2020-08-09

## 2020-08-09 RX ORDER — PREDNISONE 20 MG/1
20 TABLET ORAL DAILY
COMMUNITY
Start: 2020-08-03 | End: 2020-09-17 | Stop reason: SDUPTHER

## 2020-08-09 RX ORDER — BACLOFEN 20 MG/1
20 TABLET ORAL 3 TIMES DAILY
COMMUNITY
End: 2021-04-08 | Stop reason: SDUPTHER

## 2020-08-09 RX ORDER — OXYCODONE AND ACETAMINOPHEN 10; 325 MG/1; MG/1
1 TABLET ORAL EVERY 8 HOURS PRN
COMMUNITY
End: 2021-06-08

## 2020-08-09 RX ORDER — BUSPIRONE HYDROCHLORIDE 10 MG/1
10 TABLET ORAL 2 TIMES DAILY
COMMUNITY
End: 2021-06-08

## 2020-08-09 RX ORDER — DULOXETIN HYDROCHLORIDE 60 MG/1
60 CAPSULE, DELAYED RELEASE ORAL 2 TIMES DAILY
COMMUNITY
End: 2021-07-15

## 2020-08-09 RX ADMIN — HYDROMORPHONE HYDROCHLORIDE 1.5 MG: 2 INJECTION, SOLUTION INTRAMUSCULAR; INTRAVENOUS; SUBCUTANEOUS at 22:08

## 2020-08-09 RX ADMIN — DEXAMETHASONE SODIUM PHOSPHATE 10 MG: 10 INJECTION INTRAMUSCULAR; INTRAVENOUS at 22:08

## 2020-08-10 ENCOUNTER — TELEPHONE (OUTPATIENT)
Dept: NEUROLOGY | Age: 30
End: 2020-08-10

## 2020-08-10 NOTE — ED PROVIDER NOTES
"Subjective   30 y/o female with history of trigeminal neurologia for the last 8 years follows with Dr. Kurtz arrives for evaluation of trigeminal neurologia pain to her left face worse over the last few hours. She is on steroids (just saw Dr. Kurtz) and chronic percocet but states this is not helping her pain. She denies falls or trauma, fevers or chills. She did have surgery \"years ago\" per mother but has not been back to [facility] since that time. Upon review of her charts it is stated that she may have to go for further decompression surgery at [facility]. She arrives in [facility].         Family, social and past history reviewed as below, prior documentation of H and Ps and other documentation are reviewed:    Past Medical History:  No date: Contraception  No date: Dyspareunia  No date: Hypertension  No date: Insulin resistance  No date: IUD check up  No date: Ovarian cyst  No date: PCOS (polycystic ovarian syndrome)  No date: Trigeminal neuralgia  No date: Vitamin D deficiency    Past Surgical History:  1/10/2018:  SECTION; N/A      Comment:  Procedure:  SECTION PRIMARY;  Surgeon: Soham Osorio MD;  Location: [facility] LABOR DELIVERY;  Service:   No date: TONSILLECTOMY AND ADENOIDECTOMY      Comment:  hemorrhaged with surgical repair 3 days after surgery  No date: WISDOM TOOTH EXTRACTION    Social History    Socioeconomic History      Marital status:       Spouse name: Not on file      Number of children: Not on file      Years of education: Not on file      Highest education level: Not on file    Tobacco Use      Smoking status: Never Smoker      Smokeless tobacco: Never Used    Substance and Sexual Activity      Alcohol use: Yes        Comment: occasional      Drug use: No      Sexual activity: Yes        Partners: Male        Birth control/protection: None      Family history: reviewed and noncontributory           Review of Systems   All other systems reviewed and are " negative.      Past Medical History:   Diagnosis Date   • Contraception    • Dyspareunia    • Hypertension    • Insulin resistance    • IUD check up    • Ovarian cyst    • PCOS (polycystic ovarian syndrome)    • Trigeminal neuralgia    • Vitamin D deficiency        Allergies   Allergen Reactions   • Ibuprofen      MAKES THROAT BURN       Past Surgical History:   Procedure Laterality Date   •  SECTION N/A 1/10/2018    Procedure:  SECTION PRIMARY;  Surgeon: Soham Osorio MD;  Location: [facility] LABOR DELIVERY;  Service:    • TONSILLECTOMY AND ADENOIDECTOMY      hemorrhaged with surgical repair 3 days after surgery   • WISDOM TOOTH EXTRACTION         Family History   Problem Relation Age of Onset   • No Known Problems Mother    • No Known Problems Father    • No Known Problems Maternal Grandmother    • No Known Problems Maternal Grandfather    • Leukemia Paternal Grandmother    • No Known Problems Paternal Grandfather        Social History     Socioeconomic History   • Marital status:      Spouse name: Not on file   • Number of children: Not on file   • Years of education: Not on file   • Highest education level: Not on file   Tobacco Use   • Smoking status: Never Smoker   • Smokeless tobacco: Never Used   Substance and Sexual Activity   • Alcohol use: Yes     Comment: occasional   • Drug use: No   • Sexual activity: Yes     Partners: Male     Birth control/protection: None           Objective   Physical Exam   Constitutional: She is oriented to person, place, and time. She appears well-developed and well-nourished.   HENT:   Head: Normocephalic.   Right Ear: External ear normal.   Nose: Nose normal.   Pain upon even light palpation to her face, no signs of infection, no deficits are appreciated.    Eyes: Pupils are equal, round, and reactive to light. Conjunctivae and EOM are normal.   Neck: Normal range of motion. Neck supple.   Neurological: She is alert and oriented to person, place, and  time. No cranial nerve deficit or sensory deficit. Coordination normal.   Skin: Skin is warm. Capillary refill takes less than 2 seconds.   Psychiatric: She has a normal mood and affect. Her behavior is normal.   Vitals reviewed.      Procedures           ED Course  ED Course as of Aug 09 2155   Sun Aug 09, 2020   2119 Request # : [id number]--> currently gets narcotics from Dr. Ann    []   2119 Note from Leti on 5/3 showed that patient has apparently exhausted all options there is talks about surgery for decompression.     []   2155 Patient is already on chronic narcotics, muscle relaxer and appopriate therapy. At this time I can offer her a shot of diluadid and decadron to help ease her pain but have also asked that she talk to Dr. Kurtz tomorrow.     []      ED Course User Index  [] Bean Peng MD      [facility]    Long hx of trigeminal neuralgia, followed by Dr. Jasmine, discussed with him as on treatment and he states they have tried every option, she has had gamma knife and he has recommended she see a surgeon in [place] to possibly have microvascular decompression, hes agreeable to try to some steroid which she told me has helped some in the past, discussed follow up and return precautions    CONSULTS:  None    PROCEDURES:  Unless otherwise noted below, none    Procedures    FINAL IMPRESSION     1. Trigeminal neuralgia of right side of face                                      [facility]    Final diagnoses:   Trigeminal neuralgia            Bean Peng MD  08/09/20 2155

## 2020-08-10 NOTE — TELEPHONE ENCOUNTER
I've done all I can for her. As we have discussed before, I can refer her to a neurosurgeon for consideration of micovascular decompression and to give an opinion on the pituitary adenoma. I could refer her to [facility] neurologist for second opinion.

## 2020-08-10 NOTE — TELEPHONE ENCOUNTER
The patients  called and stated that the patient is having a TN flare up and her pain level is at a 20. She wanted to be seen today but we told him there was no way possible to get her in. She went to the ED at [facility] over the weekend and it was no help. She is wanting something to help with the flare up.

## 2020-08-11 NOTE — TELEPHONE ENCOUNTER
Spoke to patients  and gave information from Dr Brooklyn Borja.  ask if we could refer to [facility] and I explained that we don't refer to [facility], but if she wants to transfer her care she can  her records and make an appointment. She didn't want to change doctors, but did request a work excuse for the rest of the week to [facility]. She will send me a fax number.

## 2020-09-17 ENCOUNTER — OFFICE VISIT (OUTPATIENT)
Dept: FAMILY MEDICINE CLINIC | Facility: CLINIC | Age: 30
End: 2020-09-17

## 2020-09-17 VITALS
HEART RATE: 68 BPM | RESPIRATION RATE: 16 BRPM | OXYGEN SATURATION: 97 % | BODY MASS INDEX: 39.82 KG/M2 | HEIGHT: 65 IN | TEMPERATURE: 98.7 F | SYSTOLIC BLOOD PRESSURE: 138 MMHG | WEIGHT: 239 LBS | DIASTOLIC BLOOD PRESSURE: 80 MMHG

## 2020-09-17 DIAGNOSIS — F41.9 ANXIETY: ICD-10-CM

## 2020-09-17 DIAGNOSIS — Z76.89 ENCOUNTER TO ESTABLISH CARE: ICD-10-CM

## 2020-09-17 DIAGNOSIS — G50.0 TRIGEMINAL NEURALGIA OF RIGHT SIDE OF FACE: ICD-10-CM

## 2020-09-17 PROBLEM — Z00.00 WELLNESS EXAMINATION: Status: ACTIVE | Noted: 2020-09-17

## 2020-09-17 PROBLEM — Z01.89 LABORATORY TEST: Status: ACTIVE | Noted: 2020-09-17

## 2020-09-17 PROBLEM — E66.9 OBESITY: Status: ACTIVE | Noted: 2020-09-17

## 2020-09-17 PROCEDURE — 99203 OFFICE O/P NEW LOW 30 MIN: CPT | Performed by: FAMILY MEDICINE

## 2020-09-17 RX ORDER — PREDNISONE 20 MG/1
20 TABLET ORAL DAILY
Qty: 40 TABLET | Refills: 0 | Status: SHIPPED | OUTPATIENT
Start: 2020-09-17 | End: 2020-12-21

## 2020-09-17 RX ORDER — OXCARBAZEPINE 600 MG/1
600 TABLET, FILM COATED ORAL 3 TIMES DAILY
Qty: 90 TABLET | Refills: 2 | Status: SHIPPED | OUTPATIENT
Start: 2020-09-17 | End: 2020-12-07 | Stop reason: SDUPTHER

## 2020-09-17 RX ORDER — OXCARBAZEPINE 600 MG/1
1 TABLET ORAL 3 TIMES DAILY
COMMUNITY
Start: 2020-05-04 | End: 2020-09-17

## 2020-09-17 NOTE — PROGRESS NOTES
Subjective   Jaquelin Rivera is a 29 y.o. female presenting with chief complaint of:   Chief Complaint   Patient presents with   • Establish Care     trigeminal neuralgia flare up on right side of face.       History of Present Illness : With wife.  Here to establish care.       Has multiple chronic problems to consider that might have a bearing on today's issues;  an interval appointment.       Chronic/acute problems reviewed today:   1. Trigeminal neuralgia of right side of face years ago she had her right wisdom tooth removed.  After that she started having pain and eventually was seen by Dr. Buck Baez neurologist in [place] and told she had trigeminal neuralgia.  Medications of help.  No surgery is been entertained; she has had one gamma knife treatment.  Recently she cannot get any refills or helps from Dr. Buck Baez's office; they do not answer the phone return calls and they want to transfer to [facility] neurology.  In addition they want to come here for primary care.  She is having a flare of her neuralgia now; she cannot afford the long-acting oxcarbazepine; she does just as well on short acting Trileptal 600 mg 3 times daily.  She is not even asking for pain medicine today; she is asking for the prednisone Dosepak that she usually uses.   2. Anxiety chronic variable and usually worse when she is having a flare of her trigeminal neuralgia.  However she uses the BuSpar and Cymbalta somewhat for the trigeminal neuralgia.   3. Encounter to establish care see above     Has an/another acute issue today: none.    The following portions of the patient's history were reviewed and updated as appropriate: allergies, current medications, past family history, past medical history, past social history, past surgical history and problem list.      Current Outpatient Medications:   •  baclofen (LIORESAL) 20 MG tablet, Take 20 mg by mouth 3 (Three) Times a Day., Disp: , Rfl:   •  busPIRone (BUSPAR) 10 MG tablet, Take  10 mg by mouth 2 (two) times a day., Disp: , Rfl:   •  DULoxetine (CYMBALTA) 60 MG capsule, Take 60 mg by mouth 2 (Two) Times a Day., Disp: , Rfl:   •  LO LOESTRIN FE 1 MG-10 MCG / 10 MCG tablet, TAKE 1 TABLET BY MOUTH ONCE DAILY, Disp: 28 tablet, Rfl: 6  •  LYRICA 200 MG capsule, Take 200 mg by mouth 3 (Three) Times a Day., Disp: , Rfl: 1  •  oxyCODONE-acetaminophen (PERCOCET)  MG per tablet, Take 1 tablet by mouth Every 8 (Eight) Hours As Needed for Moderate Pain ., Disp: , Rfl:   •  OXcarbazepine (Trileptal) 600 MG tablet, Take 1 tablet by mouth 3 (Three) Times a Day., Disp: 90 tablet, Rfl: 2  •  predniSONE (DELTASONE) 20 MG tablet, Take 1 tablet by mouth Daily. With decreasing doses, Disp: 40 tablet, Rfl: 0    No problems with medications.  Refills if needed done    Allergies   Allergen Reactions   • Ibuprofen      MAKES THROAT BURN       Review of Systems  GENERAL:  Active home/work when no flares. Sleep is ok generally. No fever now/recent.  SKIN: No rash/skin lesion of concern (unless above/below)  ENDO:  No syncope, near or diaphoretic sweaty spells.  BS Ok past/even with pregnancy.  HEENT: No recent head injury; occ/severe trigeminal neuralgia-R headache.  No vision change.  No significant hearing loss.  Ears without pain/drainage.  No sore throat.  No significant nasal/sinus congestion/drainage. No epistaxis.  CHEST: No chest wall tenderness or mass. No cough, without wheeze.  No SOB; no hemoptysis.  CV: No exertional chest pain, palpitations, ankle edema.  GI: No dysphagia or heartburn.  No abdominal pain, diarrhea, constipation.  No rectal bleeding, or melena.    :  Voids without dysuria; incontinence to completion.  ORTHO: No painful/swollen joints but various on /off sore.  No sore neck or back.  No acute neck or back pain without recent injury.  NEURO: No focal/significant weakness of extremities. No dizziness.   No numbness/paresthesias.   PSYCH: No memory loss.  Mood/variable; occ anxious,  depressed but/and not suicidal.  Tries to tolerate stress .   Screening:  Mammogram: NA  Bone density: NA  Low dose CT chest: NA  GI: NA  Prostate: NA  Usual lab order: 12m CBC, CMP, LIPID, TSH    Lab Results:  Results for orders placed or performed in visit on 06/13/19   Comprehensive Metabolic Panel    Specimen: Blood   Result Value Ref Range    Glucose 96 65 - 99 mg/dL    BUN 8 6 - 20 mg/dL    Creatinine 0.78 0.57 - 1.00 mg/dL    eGFR Non African Am 88 >60 mL/min/1.73    eGFR African Am 107 >60 mL/min/1.73    BUN/Creatinine Ratio 10.3 7.0 - 25.0    Sodium 143 136 - 145 mmol/L    Potassium 4.6 3.5 - 5.2 mmol/L    Chloride 105 98 - 107 mmol/L    Total CO2 26.3 22.0 - 29.0 mmol/L    Calcium 9.6 8.6 - 10.5 mg/dL    Total Protein 7.0 6.0 - 8.5 g/dL    Albumin 4.80 3.50 - 5.20 g/dL    Globulin 2.2 gm/dL    A/G Ratio 2.2 g/dL    Total Bilirubin 0.4 0.2 - 1.2 mg/dL    Alkaline Phosphatase 66 39 - 117 U/L    AST (SGOT) 15 1 - 32 U/L    ALT (SGPT) 25 1 - 33 U/L   Cortisol    Specimen: Blood   Result Value Ref Range    Cortisol 10.7 ug/dL   DHEA-Sulfate    Specimen: Blood   Result Value Ref Range    DHEA-Sulfate 115.8 84.8 - 378.0 ug/dL   Hemoglobin A1c    Specimen: Blood   Result Value Ref Range    Hemoglobin A1C 4.50 (L) 4.80 - 5.60 %   Insulin, Total    Specimen: Blood   Result Value Ref Range    Insulin 25.8 (H) 2.6 - 24.9 uIU/mL   Testosterone    Specimen: Blood   Result Value Ref Range    Testosterone, Total 61 (H) 8 - 48 ng/dL   TSH    Specimen: Blood   Result Value Ref Range    TSH 2.770 0.270 - 4.200 mIU/mL   T3, Free    Specimen: Blood   Result Value Ref Range    T3, Free 2.8 2.0 - 4.4 pg/mL   T4    Specimen: Blood   Result Value Ref Range    T4, Total 5.2 4.5 - 12.0 ug/dL   Prolactin    Specimen: Blood   Result Value Ref Range    Prolactin 21.0 4.8 - 23.3 ng/mL   Liquid-based Pap Smear, Screening    Specimen: Cervix; ThinPrep Vial   Result Value Ref Range    Case Report       Gynecologic Cytology Report                        Case: [id number]                                  Authorizing Provider:  Jeannie Latif APRN Collected:           06/13/2019 05:01 PM          Ordering Location:     [facility]     Received:            06/14/2019 11:05 AM                                 [facility]                                                                 First Screen:          Fernanda Caceres                                                               Specimen:    Liquid-Based Pap, Screening, Cervix                                                        Interpretation Negative for intraepithelial lesion or malignancy      General Categorization Within normal limits     Specimen Adequacy Satisfactory for evaluation     Additional Information       Disclaimer: Cervical cytology is a screening test primarily for squamous cancer and its precursors and has associated false-negative and false-positive results.  Technologies such as liquid-based preparations may decrease but will not eliminate all false-negative results.  Follow-up of unexplained clinical signs and symptoms is recommended to minimize false-negative results. ([employer] [place] [employer] for Reporting Cervical Cytology: Rodriguez, 2015).         A1C:  Lab Results - Last 18 Months   Lab Units 06/20/19  0712   HEMOGLOBIN A1C % 4.50*     PSA:No results for input(s): PSA in the last 77688 hours.  CBC:  Lab Results - Last 18 Months   Lab Units 01/18/20  1715   WBC K/uL 9.8   HEMOGLOBIN g/dL 17.7*   HEMATOCRIT % 50.6*   PLATELETS K/uL 318      BMP/CMP:  Lab Results - Last 18 Months   Lab Units 01/18/20  1715 06/20/19  0712   SODIUM mmol/L 138 143   POTASSIUM mmol/L 4.0 4.6   CHLORIDE mmol/L 101 105   TOTAL CO2 mmol/L 23 26.3   GLUCOSE mg/dL  --  96   BUN mg/dL 5* 8   CREATININE mg/dL 0.6 0.78   EGFR IF NONAFRICN AM  >60 88   EGFR IF AFRICN AM mL/min/1.73  --  107   CALCIUM mg/dL 9.7 9.6     HEPATIC:  Lab Results - Last 18 Months   Lab Units 01/18/20  1715  "06/20/19  0712   ALT (SGPT) U/L 45* 25   AST (SGOT) U/L 32 15   ALK PHOS U/L 67 66     THYROID:  Lab Results - Last 18 Months   Lab Units 06/20/19  0712   TSH mIU/mL 2.770   T3 FREE pg/mL 2.8       Objective   /80 (BP Location: Left arm, Patient Position: Sitting, Cuff Size: Adult)   Pulse 68   Temp 98.7 °F (37.1 °C) (Infrared)   Resp 16   Ht 165.1 cm (65\")   Wt 108 kg (239 lb)   SpO2 97%   BMI 39.77 kg/m²   Body mass index is 39.77 kg/m².    Recent Vitals       6/13/2019 8/9/2020 9/17/2020       BP:  128/70  140/94  138/80     Pulse:  --  81  68     Temp:  --  98.4 °F (36.9 °C)  98.7 °F (37.1 °C)     Weight:  123 kg (272 lb)  109 kg (240 lb)  108 kg (239 lb)     BMI (Calculated):  45.3  39.9  39.8           Physical Exam  GENERAL:  Well nourished/developed appears mildly uncomfortable.  SKIN: Turgor excellent, without wound, rash, lesion other than ready right facial complexion.  HEENT: Normal cephalic without trauma but significantly sore to palpation the right trigeminal distribution/.  Pupils equal round reactive to light. Extraocular motions full without nystagmus.  Sclera on the right seems slightly injected there is a little mild amount of hearing on and off.  External canals nonobstructive nontender without reddness. Tymphatic membranes lamont with venessa structures intact.   Oral cavity without growths, exudates, and moist.  Posterior pharynx without mass, obstruction, redness.  No thyromegaly, mass, tenderness, lymphadenopathy and supple.  CV: Regular rhythm.  No murmur, gallop,  edema. Posterior pulses intact.  No carotid bruits.  CHEST: No chest wall tenderness or mass.   LUNGS: Symmetric motion with clear to auscultation.  ABD: Soft, nontender without mass.   ORTHO: Symmetric extremities without swelling/point tenderness.  Full gross range of motion.  NEURO: CN 2-12 grossly intact.  Symmetric facies and UE/LE. 3/5 strength throughout. 1/4 x bicep equal reflexes.  Nonfocal use extremities. " Speech clear. Intact light touch with monofilament, vibratory sensation with tuning fork; equal toes/distal feet.    PSYCH: Oriented x 3.  Pleasant calm, well kept.  Purposeful/directed conservation with intact short/long gross memory.     Assessment/Plan     1. Trigeminal neuralgia of right side of face    2. Anxiety    3. Encounter to establish care      Discussions:   Same Rx; refills today    Rx: reviewed/changes:  New Medications Ordered This Visit   Medications   • OXcarbazepine (Trileptal) 600 MG tablet     Sig: Take 1 tablet by mouth 3 (Three) Times a Day.     Dispense:  90 tablet     Refill:  2   • predniSONE (DELTASONE) 20 MG tablet     Sig: Take 1 tablet by mouth Daily. With decreasing doses     Dispense:  40 tablet     Refill:  0       LAB/Testing/Referrals: reviewed/orders:   Today:   Orders Placed This Encounter   Procedures   • Ambulatory Referral to Neurology     Chronic/recurrent labs above or change to:   12m CBC, CMP, LIPID, TSH advised     Body mass index is 39.77 kg/m².  Patient's Body mass index is 39.77 kg/m². BMI is above normal parameters. Recommendations include: exercise counseling, nutrition counseling and as able.      Tobacco use reviewed:    Jaquelin Rivera  reports that she has never smoked. She has never used smokeless tobacco..  There are no Patient Instructions on file for this visit.    Follow up: Return for lab at least once a year sometime;  apt at least once a year.  Future Appointments   Date Time Provider Department Center   7/20/2021 11:00 AM Josef Ramsey MD [facility] None       Procedures none

## 2020-09-23 ENCOUNTER — TELEPHONE (OUTPATIENT)
Dept: NEUROLOGY | Facility: CLINIC | Age: 30
End: 2020-09-23

## 2020-09-23 NOTE — TELEPHONE ENCOUNTER
I did try to reach Jaquelin to schedule an appointment for her but she was not available. I did leave her a message with my name and telephone number and requested she return my call.

## 2020-09-23 NOTE — TELEPHONE ENCOUNTER
I offered Jaquelin an appointment with Dr Camarena but she wanted an appointment on a Thursday afternoon. I was able to schedule her on 1/7/2021 with Jose Duarte. She did voice understanding.

## 2020-09-28 ENCOUNTER — PATIENT MESSAGE (OUTPATIENT)
Dept: NEUROLOGY | Age: 30
End: 2020-09-28

## 2020-09-29 NOTE — TELEPHONE ENCOUNTER
Phoenix Salinas has requested a refill on her medication. Last office visit : 8/3/2020   Next office visit : 2/8/2021   Last medication refill :7-30-20  Kirti Wen : 7-28-20      Requested Prescriptions     Pending Prescriptions Disp Refills    oxyCODONE-acetaminophen (PERCOCET)  MG per tablet 90 tablet 0     Sig: Take 1 tablet by mouth every 8 hours as needed for Pain for up to 30 days.  Intended supply: 30 days

## 2020-09-29 NOTE — TELEPHONE ENCOUNTER
From: Amilcar Salazar  To: Jesse Lakhani MD  Sent: 9/28/2020 8:23 AM CDT  Subject: Prescription Question    Can I please get a refill on my pain medication? I've had another flair and have run out. Thanks!

## 2020-09-30 RX ORDER — OXYCODONE AND ACETAMINOPHEN 10; 325 MG/1; MG/1
1 TABLET ORAL EVERY 8 HOURS PRN
Qty: 90 TABLET | Refills: 0 | Status: SHIPPED | OUTPATIENT
Start: 2020-09-30 | End: 2020-11-20 | Stop reason: SDUPTHER

## 2020-10-02 NOTE — TELEPHONE ENCOUNTER
Requested Prescriptions     Pending Prescriptions Disp Refills    OXTELLAR  MG TB24 [Pharmacy Med Name: Dozita Pennant  MG TABLET] 90 tablet 0     Sig: TAKE (3) TABLETS BY MOUTH EVERY DAY       Last Office Visit: 8/3/2020  Next Office Visit: 2/8/2021  Last Medication Refill: 5/4/20 with 3 refills

## 2020-10-03 RX ORDER — OXCARBAZEPINE 600 MG/1
TABLET ORAL
Qty: 90 TABLET | Refills: 5 | Status: SHIPPED | OUTPATIENT
Start: 2020-10-03

## 2020-11-20 RX ORDER — OXYCODONE AND ACETAMINOPHEN 10; 325 MG/1; MG/1
1 TABLET ORAL EVERY 8 HOURS PRN
Qty: 90 TABLET | Refills: 0 | Status: SHIPPED | OUTPATIENT
Start: 2020-11-20 | End: 2021-01-15 | Stop reason: SDUPTHER

## 2020-11-20 NOTE — TELEPHONE ENCOUNTER
Requested Prescriptions     Pending Prescriptions Disp Refills    oxyCODONE-acetaminophen (PERCOCET)  MG per tablet 90 tablet 0     Sig: Take 1 tablet by mouth every 8 hours as needed for Pain for up to 30 days.  Intended supply: 30 days       Last Office Visit:  8/3/2020  Next Office Visit:  2/8/2021  Last Medication Refill:  9/30/2020  Josie Showers up to date:  11/20/2020    *RX updated to reflect   11/20/2020  fill date*

## 2020-12-07 DIAGNOSIS — G50.0 TRIGEMINAL NEURALGIA OF RIGHT SIDE OF FACE: Primary | ICD-10-CM

## 2020-12-07 RX ORDER — OXCARBAZEPINE 600 MG/1
600 TABLET, FILM COATED ORAL 3 TIMES DAILY
Qty: 90 TABLET | Refills: 3 | Status: SHIPPED | OUTPATIENT
Start: 2020-12-07 | End: 2020-12-08 | Stop reason: SDUPTHER

## 2020-12-07 NOTE — TELEPHONE ENCOUNTER
If she is pregnant; she needs to discuss this Rx/Rx we were asked to approve with her Ob-GYN      Patient re-fill request submitted via interface.

## 2020-12-08 RX ORDER — OXCARBAZEPINE 600 MG/1
600 TABLET, FILM COATED ORAL 3 TIMES DAILY
Qty: 90 TABLET | Refills: 3 | Status: SHIPPED | OUTPATIENT
Start: 2020-12-08 | End: 2021-04-08 | Stop reason: SDUPTHER

## 2020-12-21 PROCEDURE — 87635 SARS-COV-2 COVID-19 AMP PRB: CPT | Performed by: NURSE PRACTITIONER

## 2020-12-23 ENCOUNTER — TELEMEDICINE (OUTPATIENT)
Dept: FAMILY MEDICINE CLINIC | Facility: CLINIC | Age: 30
End: 2020-12-23

## 2020-12-23 DIAGNOSIS — U07.1 COVID-19: Primary | ICD-10-CM

## 2020-12-23 PROCEDURE — 99213 OFFICE O/P EST LOW 20 MIN: CPT | Performed by: NURSE PRACTITIONER

## 2020-12-23 RX ORDER — ATORVASTATIN CALCIUM 40 MG/1
40 TABLET, FILM COATED ORAL DAILY
Qty: 14 TABLET | Refills: 0 | Status: SHIPPED | OUTPATIENT
Start: 2020-12-23 | End: 2021-07-15

## 2020-12-23 RX ORDER — FAMOTIDINE 20 MG/1
20 TABLET, FILM COATED ORAL 2 TIMES DAILY
Qty: 28 TABLET | Refills: 0 | Status: SHIPPED | OUTPATIENT
Start: 2020-12-23 | End: 2021-04-06

## 2020-12-23 NOTE — PROGRESS NOTES
Subjective   Chief Complaint:  Reevaluation of COVID-19    History of Present Illness:  This 30 y.o. female was seen via telemedicine MyChart video conference today for she was diagnosed on 2020 COVID-19.  She reports she still has some nasal congestion but overall doing well.  She did start zinc and vitamin D as well.  She denies any shortness of breath or extreme fatigue.  She reports she is able to eat and drink.    Allergies   Allergen Reactions   • Ibuprofen      MAKES THROAT BURN      Current Outpatient Medications on File Prior to Visit   Medication Sig   • baclofen (LIORESAL) 20 MG tablet Take 20 mg by mouth 3 (Three) Times a Day.   • busPIRone (BUSPAR) 10 MG tablet Take 10 mg by mouth 2 (two) times a day.   • DULoxetine (CYMBALTA) 60 MG capsule Take 60 mg by mouth 2 (Two) Times a Day.   • LO LOESTRIN FE 1 MG-10 MCG / 10 MCG tablet TAKE 1 TABLET BY MOUTH ONCE DAILY   • LYRICA 200 MG capsule Take 200 mg by mouth 3 (Three) Times a Day.   • OXcarbazepine (Trileptal) 600 MG tablet Take 1 tablet by mouth 3 (Three) Times a Day.   • oxyCODONE-acetaminophen (PERCOCET)  MG per tablet Take 1 tablet by mouth Every 8 (Eight) Hours As Needed for Moderate Pain .     No current facility-administered medications on file prior to visit.       Past Medical, Surgical, Social, and Family History:  Past Medical History:   Diagnosis Date   • Contraception    • Dyspareunia    • Hypertension    • Insulin resistance    • IUD check up    • Ovarian cyst    • PCOS (polycystic ovarian syndrome)    • Trigeminal neuralgia    • Vitamin D deficiency      Past Surgical History:   Procedure Laterality Date   •  SECTION N/A 1/10/2018    Procedure:  SECTION PRIMARY;  Surgeon: Soham Osorio MD;  Location: [facility] LABOR DELIVERY;  Service:    • TONSILLECTOMY AND ADENOIDECTOMY      hemorrhaged with surgical repair 3 days after surgery   • WISDOM TOOTH EXTRACTION       Social History     Socioeconomic History   •  Marital status:      Spouse name: Not on file   • Number of children: Not on file   • Years of education: Not on file   • Highest education level: Not on file   Tobacco Use   • Smoking status: Never Smoker   • Smokeless tobacco: Never Used   Substance and Sexual Activity   • Alcohol use: Yes     Comment: occasional   • Drug use: No   • Sexual activity: Yes     Partners: Male     Birth control/protection: None, Pill     Family History   Problem Relation Age of Onset   • No Known Problems Mother    • No Known Problems Father    • No Known Problems Maternal Grandmother    • No Known Problems Maternal Grandfather    • Leukemia Paternal Grandmother    • No Known Problems Paternal Grandfather      Review of Systems   Constitutional: Negative for appetite change, chills and fever.   HENT: Positive for congestion. Negative for sinus pressure and sore throat.    Respiratory: Positive for cough. Negative for shortness of breath.    Cardiovascular: Negative for chest pain and palpitations.   Musculoskeletal: Negative for arthralgias and myalgias.     Objective   Physical Exam  Constitutional:       General: She is not in acute distress.  Pulmonary:      Effort: Pulmonary effort is normal. No respiratory distress.   Neurological:      Mental Status: She is alert.       Assessment/Plan   Diagnoses and all orders for this visit:    1. COVID-19 (Primary)  -     atorvastatin (Lipitor) 40 MG tablet; Take 1 tablet by mouth Daily.  Dispense: 14 tablet; Refill: 0  -     famotidine (Pepcid) 20 MG tablet; Take 1 tablet by mouth 2 (Two) Times a Day.  Dispense: 28 tablet; Refill: 0    Discussion:  Advised and educated plan of care.  Continue with same home care.  Add Lipitor and Pepcid.  Follow-up as needed.    Follow-up:  No follow-ups on file.    Note e-Signed by ZITA Crenshaw on 12/23/2020 at 09:02 CST

## 2021-01-11 DIAGNOSIS — G50.0 TRIGEMINAL NEURALGIA OF RIGHT SIDE OF FACE: ICD-10-CM

## 2021-01-11 NOTE — TELEPHONE ENCOUNTER
Requested Prescriptions     Pending Prescriptions Disp Refills    pregabalin (LYRICA) 200 MG capsule [Pharmacy Med Name: PREGABALIN 200 MG CAPSULE] 90 capsule 0     Sig: TAKE (1) CAPSULE BY MOUTH THREE TIMES DAILY.        Last Office Visit:  8/3/2020  Next Office Visit:  2/8/21  Last Medication Refill:  7/7/20 with 5 refills   Jaleesa Sánchez up to date: 11/20/20     *RX updated to reflect   1/11/21  fill date*

## 2021-01-12 RX ORDER — PREGABALIN 200 MG/1
CAPSULE ORAL
Qty: 90 CAPSULE | Refills: 5 | Status: SHIPPED | OUTPATIENT
Start: 2021-01-12 | End: 2021-07-11

## 2021-01-15 ENCOUNTER — PATIENT MESSAGE (OUTPATIENT)
Dept: NEUROLOGY | Age: 31
End: 2021-01-15

## 2021-01-15 DIAGNOSIS — G50.0 TRIGEMINAL NEURALGIA OF RIGHT SIDE OF FACE: ICD-10-CM

## 2021-01-15 RX ORDER — OXYCODONE AND ACETAMINOPHEN 10; 325 MG/1; MG/1
1 TABLET ORAL EVERY 8 HOURS PRN
Qty: 90 TABLET | Refills: 0 | Status: SHIPPED | OUTPATIENT
Start: 2021-01-15 | End: 2021-03-10 | Stop reason: SDUPTHER

## 2021-01-15 NOTE — TELEPHONE ENCOUNTER
Zahraania Shahida has requested a refill on her medication. Last office visit : Visit date not found   Next office visit : 2/8/2021   Last medication refill :11-20-20  Humberto Monk : 11-20-20      Requested Prescriptions     Pending Prescriptions Disp Refills    oxyCODONE-acetaminophen (PERCOCET)  MG per tablet 90 tablet 0     Sig: Take 1 tablet by mouth every 8 hours as needed for Pain for up to 30 days.  Intended supply: 30 days

## 2021-01-15 NOTE — TELEPHONE ENCOUNTER
From: Liudmila Alvarez  To: Lyndsey Azul MD  Sent: 1/15/2021 11:55 AM CST  Subject: Prescription Question    May I please have a refill of my Percocet sent to [facility] on [address] [place]. I am also slowly switching all prescriptions to being filled at [facility] in [place],but I remembered once being to this particular medication could not be sent out of state. If this has changed please send the medication refill to the [facility] location. Thank you!

## 2021-01-25 DIAGNOSIS — G50.0 TRIGEMINAL NEURALGIA OF RIGHT SIDE OF FACE: Primary | ICD-10-CM

## 2021-01-25 NOTE — TELEPHONE ENCOUNTER
Yes; I was not sure what we did last time or what she wants  ? Medrol dose pack    ? Decadron??    Just have to call her      Regarding: Prescription Question  Contact: [phone]  ----- Message from Merlene Simeon LPN sent at 1/25/2021  2:10 PM CST -----  My chart message     ----- Message from Jaquelin Rivera to Josef Ramsey MD sent at 1/25/2021 12:45 PM -----   Dr Ramsey, I've had a flair in my [place] and was wondering if you'd be willing to prescribe a steroid prescription for me again?  I had an appointment to see Dr. Camarena on January 7th but due to our Covid diagnosis they rescheduled me to later date in April.

## 2021-01-26 RX ORDER — PREDNISONE 20 MG/1
20 TABLET ORAL DAILY
Qty: 40 TABLET | Refills: 0 | Status: SHIPPED | OUTPATIENT
Start: 2021-01-26 | End: 2021-04-06

## 2021-01-26 NOTE — TELEPHONE ENCOUNTER
"Per Dr Ramsey \"Yes; I was not sure what we did last time or what she wants  ? Medrol dose pack    ? Decadron??     Just have to call her\"    Called patient He gave me a 20 mg     predniSONE (DELTASONE) 20 MG tablet       Sig: Take 1 tablet by mouth Daily. With decreasing doses       Dispense:  40 tablet       Refill:  0     Advised pt to check with her pharmacy later  "

## 2021-03-07 ENCOUNTER — PATIENT MESSAGE (OUTPATIENT)
Dept: NEUROLOGY | Age: 31
End: 2021-03-07

## 2021-03-07 DIAGNOSIS — G50.0 TRIGEMINAL NEURALGIA OF RIGHT SIDE OF FACE: ICD-10-CM

## 2021-03-09 NOTE — TELEPHONE ENCOUNTER
Rupinder Ceballos has requested a refill on her medication. Last office visit : Visit date not found   Next office visit : 5/24/2021   Last medication refill :1-15-21  Robert Serene : 3-9-21    Requested Prescriptions     Pending Prescriptions Disp Refills    oxyCODONE-acetaminophen (PERCOCET)  MG per tablet 90 tablet 0     Sig: Take 1 tablet by mouth every 8 hours as needed for Pain for up to 30 days.  Intended supply: 30 days

## 2021-03-09 NOTE — TELEPHONE ENCOUNTER
From: Leigh Dalton  To: Beryl Persley MD  Sent: 3/7/2021 9:57 AM CST  Subject: Prescription Question    Can I please have a refill on my Percocet sent to [facility].

## 2021-03-10 RX ORDER — OXYCODONE AND ACETAMINOPHEN 10; 325 MG/1; MG/1
1 TABLET ORAL EVERY 8 HOURS PRN
Qty: 90 TABLET | Refills: 0 | Status: SHIPPED | OUTPATIENT
Start: 2021-03-10 | End: 2021-04-23 | Stop reason: SDUPTHER

## 2021-04-06 ENCOUNTER — OFFICE VISIT (OUTPATIENT)
Dept: NEUROLOGY | Facility: CLINIC | Age: 31
End: 2021-04-06

## 2021-04-06 ENCOUNTER — LAB (OUTPATIENT)
Dept: LAB | Facility: HOSPITAL | Age: 31
End: 2021-04-06

## 2021-04-06 ENCOUNTER — TELEPHONE (OUTPATIENT)
Dept: NEUROLOGY | Facility: CLINIC | Age: 31
End: 2021-04-06

## 2021-04-06 VITALS
HEART RATE: 92 BPM | OXYGEN SATURATION: 99 % | DIASTOLIC BLOOD PRESSURE: 84 MMHG | HEIGHT: 64 IN | BODY MASS INDEX: 47.53 KG/M2 | WEIGHT: 278.4 LBS | SYSTOLIC BLOOD PRESSURE: 122 MMHG

## 2021-04-06 DIAGNOSIS — E28.2 PCOS (POLYCYSTIC OVARIAN SYNDROME): ICD-10-CM

## 2021-04-06 DIAGNOSIS — G47.10 HYPERSOMNOLENCE: ICD-10-CM

## 2021-04-06 DIAGNOSIS — G50.0 TRIGEMINAL NEURALGIA OF RIGHT SIDE OF FACE: Primary | ICD-10-CM

## 2021-04-06 DIAGNOSIS — D35.2 PITUITARY ADENOMA (HCC): ICD-10-CM

## 2021-04-06 DIAGNOSIS — G47.30 OBSERVED SLEEP APNEA: ICD-10-CM

## 2021-04-06 DIAGNOSIS — R06.83 SNORING: ICD-10-CM

## 2021-04-06 PROCEDURE — 99204 OFFICE O/P NEW MOD 45 MIN: CPT | Performed by: PHYSICIAN ASSISTANT

## 2021-04-06 NOTE — TELEPHONE ENCOUNTER
Caller: Jaquelin Rivera    Relationship: Self    Best call back number: [phone]    What medications are you currently taking:   Current Outpatient Medications on File Prior to Visit   Medication Sig Dispense Refill   • atorvastatin (Lipitor) 40 MG tablet Take 1 tablet by mouth Daily. 14 tablet 0   • baclofen (LIORESAL) 20 MG tablet Take 20 mg by mouth 3 (Three) Times a Day.     • busPIRone (BUSPAR) 10 MG tablet Take 10 mg by mouth 2 (two) times a day.     • DULoxetine (CYMBALTA) 60 MG capsule Take 60 mg by mouth 2 (Two) Times a Day.     • LO LOESTRIN FE 1 MG-10 MCG / 10 MCG tablet TAKE 1 TABLET BY MOUTH ONCE DAILY 28 tablet 6   • LYRICA 200 MG capsule Take 200 mg by mouth 3 (Three) Times a Day.  1   • OXcarbazepine (Trileptal) 600 MG tablet Take 1 tablet by mouth 3 (Three) Times a Day. 90 tablet 3   • oxyCODONE-acetaminophen (PERCOCET)  MG per tablet Take 1 tablet by mouth Every 8 (Eight) Hours As Needed for Moderate Pain .     • [DISCONTINUED] famotidine (Pepcid) 20 MG tablet Take 1 tablet by mouth 2 (Two) Times a Day. 28 tablet 0   • [DISCONTINUED] predniSONE (DELTASONE) 20 MG tablet Take 1 tablet by mouth Daily. With decreasing doses 40 tablet 0     No current facility-administered medications on file prior to visit.        When did you start taking these medications: NA    Which medication are you concerned about: TRILEPTAL 600MG 3 TIMES A DAY, BACLOFEN 20MG 3 TIMES A DAY, LYRICA 200MG 3 TIMES A DAY    Who prescribed you this medication: DR.WILLIAM TAN     What are your concerns: PATIENT STATES THAT SHE WAS IN EARLIER FOR AN APPOINTMENT. SHE SAID THAT SHE HAD MENTIONED TO [facility] THAT THE REGIMEN WAS WORKING FOR RX'S BUT IS ALMOST OUT OF REFILLS AND WANTED TO KNOW SINCE SHE HAS CHANGED OVER TO [facility] IF SHE CAN GET REFILLS NOW THROUGH HIM. PLEASE ADVISE.     How long have you been taking these medications: NA    How long have you had these concerns: NA

## 2021-04-06 NOTE — PROGRESS NOTES
Neurology Consult Note    Referring Provider: Dr. Josef Ramsey    Reason for Consultation:    Trigeminal neuralgia, right  Pituitary adenoma    Subjective     History of Present Illness:  This is a 30-year-old right-hand-dominant female routinely cared for by Dr. Josef Ramsey, who is referred for evaluation of right-sided trigeminal neuralgia.  This began in 2011.  The patient has a history of gamma knife radiosurgery without significant benefit.  In the past, she has been managed with the following medications:    Tegretol  Gabapentin  Lyrica  Trileptal  Oxtellar XR  Baclofen  Cymbalta  Norco  Percocet    Additionally, the patient has a history of pituitary adenoma that has been surveilled and been stable for some time.  She has no specific symptoms related to that.  She has a history of polycystic ovarian syndrome that has not been evaluated by an endocrinologist.  She does not recall having the pituitary panel of laboratory evaluation in the past.    Past Medical History:   Diagnosis Date   • Contraception    • Dyspareunia    • Hypertension    • Insulin resistance    • IUD check up    • Ovarian cyst    • PCOS (polycystic ovarian syndrome)    • Trigeminal neuralgia    • Vitamin D deficiency        Allergies   Allergen Reactions   • Ibuprofen      MAKES THROAT BURN     Current Outpatient Medications on File Prior to Visit   Medication Sig   • atorvastatin (Lipitor) 40 MG tablet Take 1 tablet by mouth Daily.   • baclofen (LIORESAL) 20 MG tablet Take 20 mg by mouth 3 (Three) Times a Day.   • busPIRone (BUSPAR) 10 MG tablet Take 10 mg by mouth 2 (two) times a day.   • DULoxetine (CYMBALTA) 60 MG capsule Take 60 mg by mouth 2 (Two) Times a Day.   • LO LOESTRIN FE 1 MG-10 MCG / 10 MCG tablet TAKE 1 TABLET BY MOUTH ONCE DAILY   • LYRICA 200 MG capsule Take 200 mg by mouth 3 (Three) Times a Day.   • OXcarbazepine (Trileptal) 600 MG tablet Take 1 tablet by mouth 3 (Three) Times a Day.   • oxyCODONE-acetaminophen  (PERCOCET)  MG per tablet Take 1 tablet by mouth Every 8 (Eight) Hours As Needed for Moderate Pain .   • [DISCONTINUED] famotidine (Pepcid) 20 MG tablet Take 1 tablet by mouth 2 (Two) Times a Day.   • [DISCONTINUED] predniSONE (DELTASONE) 20 MG tablet Take 1 tablet by mouth Daily. With decreasing doses     No current facility-administered medications on file prior to visit.       Social History     Socioeconomic History   • Marital status:      Spouse name: Not on file   • Number of children: Not on file   • Years of education: Not on file   • Highest education level: Not on file   Tobacco Use   • Smoking status: Never Smoker   • Smokeless tobacco: Never Used   Vaping Use   • Vaping Use: Never used   Substance and Sexual Activity   • Alcohol use: Yes     Comment: occasional   • Drug use: No   • Sexual activity: Yes     Partners: Male     Birth control/protection: None, Pill     Family History   Problem Relation Age of Onset   • No Known Problems Mother    • No Known Problems Father    • No Known Problems Maternal Grandmother    • No Known Problems Maternal Grandfather    • Leukemia Paternal Grandmother    • No Known Problems Paternal Grandfather        Review of Systems  A 14 point review of systems was reviewed and was negative.    Objective      Vital Signs  Heart Rate:  [92] 92  BP: (122)/(84) 122/84    General Exam:  Head:  Normocephalic, atraumatic.  HEENT: PERRLA.  Full EOM.  Neck:  No lymphadenopathy, thyromegaly or bruit.  Cardiac:  Regular rate and rhythm.  Normal S1, S2.  No murmur, rub or gallop.  Lungs:  Clear to auscultation bilaterally.  No wheeze, rales or rhonchi.  Abdomen:  Non-tender, Non-distended.  Bowel sounds normoactive.  Extremities: Full peripheral pulses.  No clubbing, cyanosis or edema.  Skin: No ulceration, breakdown or rash.      Neurologic Exam:  Mental Status:    -Awake. Alert. Oriented to person, place & time.  -No word finding difficulties.  -No aphasia.  -No  dysarthria.  -Follows simple commands.     CN II:  Full visual fields with confrontation.  Pupils equally reactive to light.  CN III, IV, VI:  Extraocular muscles function intact with no nystagmus.  CN V:  Facial sensory is symmetric.  CN VII:  Facial motor symmetric.  CN VIII:  Gross hearing intact bilaterally.  CN IX/X:  Palate elevates symmetrically.  CN XI:  Shoulder shrug symmetric.  CN XII:  Tongue is midline on protrusion.     Motor: (strength out of 5:  1= minimal movement, 2 = movement in plane of gravity, 3 = movement against gravity, 4 = movement against some resistance, 5 = full strength)     -5/5 in bilateral biceps, triceps, brachioradialis, wrist extensors and intrinsic muscles of the hand.    -5/5 in bilateral hip flexors, quadriceps, hamstrings, gastrocsoleus complex, anterior tibialis and extensor hallucis longus.       Deep Tendon Reflexes:  -Right              Biceps: 2+         Triceps: 2+      Brachioradialis: 2+              Patella: 2+       Ankle: 2+         Babinski:  negative  -Left              Biceps: 2+         Triceps: 2+      Brachioradialis: 2+              Patella: 2+       Ankle: 2+         Babinski:  negative    Tone (Modified Ara Scale):  No appreciable increase in tone or rigidity noted.     Sensory:  -Intact to light touch, pinprick BUE (C5-T1) and BLE (L2-S1).     Coordination:  -Finger to nose intact BUEs  -Heel to shin intact BLEs  -No ataxia     Gait  -No signs of ataxia  -ambulates unassisted    Results Review:    MRI BRAIN W CONTRAST 2/13/2020 2:53 PM   HISTORY: D35.2   Comparison: Brain MRI dated 2/6/2020       Technique: Multiplanar imaging of the brain was performed in a routine   fashion before and after the intravenous injection of gadolinium   contrast. 20 mL MultiHance IV contrast. Multiphasic coronal post   contrast imaging of the pituitary gland and sella for suspected cystic   pituitary adenoma.     Findings:   There is an approximately 2.6 x 1.4 cm lesion  "involving the sella,   suprasellar cistern and extending into the paramedian left middle   cranial fossa adjacent to the greater sphenoid wing (series 801-images   94 through 105). This lesion is quite hyperintense on T2, mimicking   the CSF. On the recent prior exam there were heavily T2-weighted   images which distinguish the lesion from the CSF, and the measurements   are obtained from these prior heavily T2-weighted images. This lesion   is nonenhancing and displays a prominent diffusion restriction, which   are typical features of epidermoid cysts. This lesion appears to   extend into the sella left of midline, displacing the pituitary gland   and pituitary stalk. This accounts for what was thought to be a cyst   on the initial MRI. There is a slight mass effect on the optic chiasm   and the left optic tract as well. This lesion appears closely   associated with the suprasellar portion of the left ICA, the ICA   terminus as well as the proximal M1 segment of the left MCA. I do not   see extension of the lesion into the prepontine space to involve the   cisternal segments of the 5th cranial nerves, and as such I am unsure   if this would contribute to the patient's right trigeminal neuralgia.   I do not see a true pituitary adenoma on this exam. The distal carotid   and MCA flow voids appear normal in caliber and symmetric. Ventricles   are nondilated. Approximately 5.5 mm cerebellar tonsillar ectopia at   the foramen magnum.     IMPRESSION:     1. There is a 2.6 cm lesion involving the sella, suprasellar cistern   and left middle cranial fossa along the paramedian greater sphenoid   wing, displaying imaging characteristics of epidermoid cyst. The   sellar component of this epidermoid cyst accounts for what was   initially thought to be a \"cystic pituitary adenoma.\" There is mass   effect on the pituitary gland, pituitary stalk, optic chiasm and left   optic tract although I am unsure if this degree of mass " effect is of   any clinical significance. This lesion does not appear to extend   inferiorly into the prepontine space, and it seems unlikely (from the   imaging) that this would be responsible for the patient's right-sided   trigeminal neuralgia symptoms.     2. Mild Chiari I malformation, with just over 5 mm tonsillar ectopia   at the foramen. Ventricles are nondilated.       Signed by Dr Hamlet Alejo on 2/14/2020 8:16 AM          Assessment/Plan     Impression:  1.  Right sided trigeminal neuralgia  2.  Pituitary adenoma  3.  Polycystic ovarian syndrome  4.  BMI 47.79  5.  Hypersomnolence  6.  Snoring  7.  Bilateral temporomandibular joint dysfunction      Plan:  1.  Continue pregabalin 200 mg 3 times daily, Trileptal 600 mg 3 times daily, duloxetine 120 mg daily, baclofen 20 mg 3 times daily, buspirone 10 mg twice daily and oxycodone 10/325 3 times daily as needed for treatment of her trigeminal neuralgia.    2.  I reviewed the clinical and previous radiographic findings with the patient in detail.  The pituitary-associated mass does appear to be a pituitary adenoma that is cystic in structure.  I do believe there is mass-effect upon the pituitary and this potentially could be a contributor to her other symptoms such as her polycystic ovarian syndrome.  For this reason, and because the patient has not previously been evaluated in this manner, I do believe obtaining a pituitary panel of laboratory studies is appropriate and I have ordered these today.  This will include prolactin, FSH, LH, free T4/TSH, growth hormone, ACTH, antidiuretic hormone.  I would like a hemoglobin A1c as well.    3.  I believe that further surveillance of this adenoma via a repeat MRI is reasonable and because this study was from approximately 1 year ago, we will obtain updated MRI imaging of the brain with and without contrast to demonstrate either progression or stability of this lesion.    4.  Regarding the patient's trigeminal  neuralgia, her symptoms are somewhat manageable.  She may benefit from referral to speech therapy to consider e-stim or a TENS unit of the face.  She does have somewhat diminished facial expression, however, she states this is due to an apprehension of potential pain.    5.  Patient has bilateral, severe TM joint dysfunction.  She may benefit from nighttime bite guard to help further with her chronic pain.    6.  Rather than using oxycodone, she may benefit from medication such as tramadol for its effects on serotonin and norepinephrine and mu opioid receptors.  Additionally, she may benefit from long-term pain management with medication such as Nucynta as this seems to help somewhat superiorly to narcotic analgesics such as the oxycodone in the setting of neuropathic pain.    7.  Arrange for outpatient, N-lab sleep study given the patient's symptoms suggestive of obstructive sleep apnea.  Her body habitus, posterior oropharyngeal anatomy placing her at risk for this as well as her demonstrating profound hypersomnolence, snoring and observed sleep apnea by her spouse.  This also can negatively impact her pain, if obstructive sleep apnea is present and untreated.    The patient voices understanding and agreement with the plan of care and will call for any concerns or questions in the interim.  I will anticipate following up with her after the updated MRI, laboratory evaluation and sleep study is complete.        Thank you, Dr. Ramsey, for the referral and the opportunity to participate in the care of your patient.  Please call with any concerns or questions in the interim.      Warm regards,      Jose Duarte PA-C  04/06/21  14:09 CDT           Greater than 65 minutes spent preparing the patient's visit in chart, reviewing past history and diagnostic studies including imaging and laboratory evaluation, obtaining clinical history, performing physical examination, and counseling the patient on the prescribed plan  of therapy and care, ordering diagnostic testing, making appropriate referrals, documenting the encounter and interpretation of results and coordination of care.

## 2021-04-08 ENCOUNTER — LAB (OUTPATIENT)
Dept: LAB | Facility: HOSPITAL | Age: 31
End: 2021-04-08

## 2021-04-08 DIAGNOSIS — D35.2 PITUITARY ADENOMA (HCC): ICD-10-CM

## 2021-04-08 DIAGNOSIS — G50.0 TRIGEMINAL NEURALGIA OF RIGHT SIDE OF FACE: ICD-10-CM

## 2021-04-08 DIAGNOSIS — E28.2 PCOS (POLYCYSTIC OVARIAN SYNDROME): ICD-10-CM

## 2021-04-08 LAB
FSH SERPL-ACNC: 7.43 MIU/ML
HBA1C MFR BLD: 4.42 % (ref 4.8–5.6)
LH SERPL-ACNC: 9.78 MIU/ML
PROLACTIN SERPL-MCNC: 20.5 NG/ML (ref 4.79–23.3)
T4 FREE SERPL-MCNC: 0.69 NG/DL (ref 0.93–1.7)
TSH SERPL DL<=0.05 MIU/L-ACNC: 2.07 UIU/ML (ref 0.27–4.2)

## 2021-04-08 PROCEDURE — 36415 COLL VENOUS BLD VENIPUNCTURE: CPT

## 2021-04-08 PROCEDURE — 83001 ASSAY OF GONADOTROPIN (FSH): CPT

## 2021-04-08 PROCEDURE — 83003 ASSAY GROWTH HORMONE (HGH): CPT

## 2021-04-08 PROCEDURE — 84146 ASSAY OF PROLACTIN: CPT

## 2021-04-08 PROCEDURE — 82024 ASSAY OF ACTH: CPT

## 2021-04-08 PROCEDURE — 83002 ASSAY OF GONADOTROPIN (LH): CPT

## 2021-04-08 PROCEDURE — 83036 HEMOGLOBIN GLYCOSYLATED A1C: CPT

## 2021-04-08 PROCEDURE — 84443 ASSAY THYROID STIM HORMONE: CPT

## 2021-04-08 PROCEDURE — 84439 ASSAY OF FREE THYROXINE: CPT

## 2021-04-08 PROCEDURE — 84588 ASSAY OF VASOPRESSIN: CPT

## 2021-04-08 RX ORDER — OXCARBAZEPINE 600 MG/1
600 TABLET, FILM COATED ORAL 3 TIMES DAILY
Qty: 90 TABLET | Refills: 2 | Status: SHIPPED | OUTPATIENT
Start: 2021-04-08 | End: 2021-06-30 | Stop reason: SDUPTHER

## 2021-04-08 RX ORDER — BACLOFEN 20 MG/1
20 TABLET ORAL 3 TIMES DAILY
Qty: 90 TABLET | Refills: 2 | Status: SHIPPED | OUTPATIENT
Start: 2021-04-08 | End: 2021-06-08

## 2021-04-09 LAB
ACTH PLAS-MCNC: 21 PG/ML (ref 7.2–63.3)
GH SERPL-MCNC: 0.5 NG/ML (ref 0–10)

## 2021-04-15 LAB — VASOPRESSIN SERPL-MCNC: <0.8 PG/ML (ref 0–4.7)

## 2021-04-23 DIAGNOSIS — G50.0 TRIGEMINAL NEURALGIA OF RIGHT SIDE OF FACE: ICD-10-CM

## 2021-04-23 RX ORDER — OXYCODONE AND ACETAMINOPHEN 10; 325 MG/1; MG/1
1 TABLET ORAL EVERY 8 HOURS PRN
Qty: 90 TABLET | Refills: 0 | Status: SHIPPED | OUTPATIENT
Start: 2021-04-23 | End: 2021-05-25 | Stop reason: SDUPTHER

## 2021-04-23 NOTE — TELEPHONE ENCOUNTER
Katia Alvarenga has requested a refill on her medication. Last office visit : 8/3/20  Next office visit : 5/24/2021   Last medication refill : 3/10/21  Para Duster : up to date       Requested Prescriptions     Pending Prescriptions Disp Refills    oxyCODONE-acetaminophen (PERCOCET)  MG per tablet 90 tablet 0     Sig: Take 1 tablet by mouth every 8 hours as needed for Pain for up to 30 days.  Intended supply: 30 days           Dr. Christian Lock patient

## 2021-04-26 ENCOUNTER — HOSPITAL ENCOUNTER (OUTPATIENT)
Dept: MRI IMAGING | Facility: HOSPITAL | Age: 31
Discharge: HOME | End: 2021-04-26

## 2021-04-26 DIAGNOSIS — D35.2 PITUITARY ADENOMA (HCC): ICD-10-CM

## 2021-04-26 DIAGNOSIS — D35.2 PITUITARY ADENOMA (HCC): Primary | ICD-10-CM

## 2021-04-26 DIAGNOSIS — G50.0 TRIGEMINAL NEURALGIA OF RIGHT SIDE OF FACE: ICD-10-CM

## 2021-04-26 PROCEDURE — 70553 MRI BRAIN STEM W/O & W/DYE: CPT

## 2021-04-26 PROCEDURE — A9577 INJ MULTIHANCE: HCPCS | Performed by: PHYSICIAN ASSISTANT

## 2021-04-26 PROCEDURE — 0 GADOBENATE DIMEGLUMINE 529 MG/ML SOLUTION: Performed by: PHYSICIAN ASSISTANT

## 2021-04-26 RX ADMIN — GADOBENATE DIMEGLUMINE 20 ML: 529 INJECTION, SOLUTION INTRAVENOUS at 16:22

## 2021-04-30 RX ORDER — PREDNISONE 10 MG/1
TABLET ORAL
Qty: 30 TABLET | Refills: 0 | Status: SHIPPED | OUTPATIENT
Start: 2021-04-30 | End: 2021-05-12

## 2021-05-05 ENCOUNTER — TELEPHONE (OUTPATIENT)
Dept: NEUROLOGY | Facility: CLINIC | Age: 31
End: 2021-05-05

## 2021-05-05 NOTE — TELEPHONE ENCOUNTER
Provider: MYRTLE RFANCOIS    Caller: MERARI EMANUEL    Pharmacy: [facility] #[id number]- [phone]      Reason for Call: MERARI CALLED IN STATING THAT  HER NEURALGIA ON THE RIGHT SIDE OF FACE HAS FLARED UP.   SHE STATES THAT SHE CAN NOT EAT OF BARLEY TALK.   MERARI WAS WANTING POSSIBLY SOME STEROIDS PHONED INTO THE PHARMACY, OR MAYBE ADJUST HER MEDICATIONS TO GET THE FLARE UP UNDER CONTROL.  PLEASE CALL PATIENT TO DISCUSS.

## 2021-05-06 DIAGNOSIS — G50.0 TRIGEMINAL NEURALGIA OF RIGHT SIDE OF FACE: Primary | ICD-10-CM

## 2021-05-06 RX ORDER — METHYLPREDNISOLONE 4 MG/1
TABLET ORAL
Qty: 1 EACH | Refills: 0 | Status: SHIPPED | OUTPATIENT
Start: 2021-05-06 | End: 2021-06-08

## 2021-05-24 ENCOUNTER — PATIENT MESSAGE (OUTPATIENT)
Dept: NEUROLOGY | Age: 31
End: 2021-05-24

## 2021-05-24 DIAGNOSIS — G50.0 TRIGEMINAL NEURALGIA OF RIGHT SIDE OF FACE: ICD-10-CM

## 2021-05-25 RX ORDER — OXYCODONE AND ACETAMINOPHEN 10; 325 MG/1; MG/1
1 TABLET ORAL EVERY 8 HOURS PRN
Qty: 90 TABLET | Refills: 0 | Status: SHIPPED | OUTPATIENT
Start: 2021-05-25 | End: 2021-06-24

## 2021-05-25 NOTE — TELEPHONE ENCOUNTER
Requested Prescriptions     Pending Prescriptions Disp Refills    oxyCODONE-acetaminophen (PERCOCET)  MG per tablet 90 tablet 0     Sig: Take 1 tablet by mouth every 8 hours as needed for Pain for up to 30 days.  Intended supply: 30 days       Last Office Visit:  8/3/20  Next Office Visit:  7/20/2021  Last Medication Refill: 4/23/21    Pooja Mccarthy up to date:  3/7/21    *RX updated to reflect   5/25/21  fill date*

## 2021-05-25 NOTE — TELEPHONE ENCOUNTER
From: Carmel Zaman  To: Zbigniew Velasquez MD  Sent: 5/24/2021 6:33 PM CDT  Subject: Prescription Question    Could I please have a refill on my Percocet sent to [address] [place] [address]?

## 2021-06-08 ENCOUNTER — OFFICE VISIT (OUTPATIENT)
Dept: NEUROLOGY | Facility: CLINIC | Age: 31
End: 2021-06-08

## 2021-06-08 VITALS
OXYGEN SATURATION: 98 % | HEART RATE: 79 BPM | DIASTOLIC BLOOD PRESSURE: 86 MMHG | SYSTOLIC BLOOD PRESSURE: 130 MMHG | WEIGHT: 265 LBS | BODY MASS INDEX: 45.24 KG/M2 | HEIGHT: 64 IN

## 2021-06-08 DIAGNOSIS — M26.629 TMJPDS (TEMPOROMANDIBULAR JOINT PAIN DYSFUNCTION SYNDROME): ICD-10-CM

## 2021-06-08 DIAGNOSIS — E28.2 PCOS (POLYCYSTIC OVARIAN SYNDROME): ICD-10-CM

## 2021-06-08 DIAGNOSIS — D35.2 PITUITARY ADENOMA (HCC): ICD-10-CM

## 2021-06-08 DIAGNOSIS — G47.30 OBSERVED SLEEP APNEA: ICD-10-CM

## 2021-06-08 DIAGNOSIS — G50.0 TRIGEMINAL NEURALGIA OF RIGHT SIDE OF FACE: Primary | ICD-10-CM

## 2021-06-08 PROCEDURE — 99215 OFFICE O/P EST HI 40 MIN: CPT | Performed by: PHYSICIAN ASSISTANT

## 2021-06-08 RX ORDER — TRAMADOL HYDROCHLORIDE 50 MG/1
TABLET ORAL
Qty: 120 TABLET | Refills: 0 | Status: SHIPPED | OUTPATIENT
Start: 2021-06-08 | End: 2021-07-02 | Stop reason: SDUPTHER

## 2021-06-09 NOTE — PROGRESS NOTES
Neurology Progress Note      Chief Complaint:    Right-sided facial pain  Trigeminal neuralgia  Pituitary adenoma  Sleep apnea    Subjective     Subjective:  Patient returns today for follow-up evaluation of right-sided facial pain consistent with a diagnosis of trigeminal neuralgia primarily over the mandibular and maxillary branches.  The patient does have recognized comorbid TM joint dysfunction and has had observed sleep apnea for which I had previously ordered a sleep study.  This has not yet been obtained, however, the patient states that it is scheduled.  In the interim of seeing her last visit, the patient had called with increasing symptoms of pain and requested oral steroids for which I placed on a Medrol Dosepak.  She states this did help, however, in the past, Dr. Jasmine has placed her on a 60 mg taper of prednisone for these episodes of exacerbation.    The patient continues to have limited facial expression on the fears that she may have exacerbations of pain and wearing a mask because of COVID-19, has been a significant exacerbation for her facial sensitivity.    The patient also recognizes that she has diffusely carious dentition and poor oral hygiene.  We discussed how she grinds her teeth and clenches and this also may be manifested because of her suspected sleep apnea.  She has not previously used a bite guard.    Today, she localizes much of her pain around the right lateral aspect of the mouth and lips.  This does extend into the mandibular region also and up into the zygomatic region of the face.    She continues to be managed with:    Cymbalta 120 mg daily  Lyrica 200 mg 3 times daily  Trileptal 600 mg 3 times daily    She continues also to use episodic use of Percocet 10/325, however, does this on a limited basis without any significant benefit.  She also uses baclofen 20 mg 3 times daily and does not believe this provides any benefit.  In the past, she also has had a prescription for  buspirone 10 mg 2 times daily, however also does not take this medication.      Past Medical History:   Diagnosis Date   • Contraception    • Dyspareunia    • Hypertension    • Insulin resistance    • IUD check up    • Ovarian cyst    • PCOS (polycystic ovarian syndrome)    • Trigeminal neuralgia    • Vitamin D deficiency      Past Surgical History:   Procedure Laterality Date   •  SECTION N/A 1/10/2018    Procedure:  SECTION PRIMARY;  Surgeon: Soham Osorio MD;  Location: [facility] LABOR DELIVERY;  Service:    • TONSILLECTOMY AND ADENOIDECTOMY      hemorrhaged with surgical repair 3 days after surgery   • WISDOM TOOTH EXTRACTION       Family History   Problem Relation Age of Onset   • No Known Problems Mother    • No Known Problems Father    • No Known Problems Maternal Grandmother    • No Known Problems Maternal Grandfather    • Leukemia Paternal Grandmother    • No Known Problems Paternal Grandfather      Social History     Tobacco Use   • Smoking status: Never Smoker   • Smokeless tobacco: Never Used   Vaping Use   • Vaping Use: Never used   Substance Use Topics   • Alcohol use: Yes     Comment: occasional   • Drug use: No       Medications:  Current Outpatient Medications   Medication Sig Dispense Refill   • atorvastatin (Lipitor) 40 MG tablet Take 1 tablet by mouth Daily. 14 tablet 0   • DULoxetine (CYMBALTA) 60 MG capsule Take 60 mg by mouth 2 (Two) Times a Day.     • LO LOESTRIN FE 1 MG-10 MCG / 10 MCG tablet TAKE 1 TABLET BY MOUTH ONCE DAILY 28 tablet 6   • Lyrica 200 MG capsule Take 1 capsule by mouth 3 (Three) Times a Day. 90 capsule 2   • OXcarbazepine (Trileptal) 600 MG tablet Take 1 tablet by mouth 3 (Three) Times a Day. 90 tablet 2   • traMADol (Ultram) 50 MG tablet 2 tabs po  tablet 0     No current facility-administered medications for this visit.       Allergies:    Ibuprofen    Review of Systems:   -A 14 point review of systems is completed and is negative.      Objective       Vital Signs  Heart Rate:  [79] 79  BP: (130)/(86) 130/86    Physical Exam:    General Exam:  Head:  Normocephalic, atraumatic.  HEENT: PERRLA.  Full EOM.  Patient has normal movement of facial muscles and symmetry, however, is very guarded and apprehensive of movement of the facial muscles because of elicitation of pain.  Neck:  No lymphadenopathy, thyromegaly or bruit.  Cardiac:  Regular rate and rhythm.  Normal S1, S2.  No murmur, rub or gallop.  Lungs:  Clear to auscultation bilaterally.  No wheeze, rales or rhonchi.  Abdomen:  Non-tender, Non-distended.  Bowel sounds normoactive.  Extremities: Full peripheral pulses.  No clubbing, cyanosis or edema.  Skin: No ulceration, breakdown or rash.      Neurologic Exam:  CERVICAL SPINE EXAMINATION:  RANGE OF MOTION: The patient is able to flex, extend, rotate, and side bend without pain or difficulty.  There is full range of motion.    PALPATION: Nontender to palpation midline and through upper cervical musculature.  STRENGTH: 5/5 bilateral trapezius, deltoid, triceps, biceps, wrist extensors/flexors, finger opposition.  SENSATION: Light touch and pinprick intact C5-T1 bilaterally.  REFLEXES: DTRs are 2+ bilaterally at biceps, triceps, and brachioradialis.  Vargas's and palmomental are negative bilaterally.  SPECIAL TESTS:  Tinel's & Phalen's are negative. Spurling's is negative bilaterally.     Coordination:  -Finger to nose intact BUEs  -Heel to shin intact BLEs  -No ataxia     Gait  -No signs of ataxia  -ambulates unassisted       Results Review:        Assessment/Plan     Impression:  1.  Right-sided facial pain  2.  Trigeminal neuralgia, right  3.  Observed sleep apnea  4.  Bruxism  5.  Poor oral hygiene/diffusely carious teeth  6.  TM joint dysfunction, bilateral      Plan:  1.  At this time, I would like to improve her medication regimen.  We are going to discontinue baclofen, buspirone and oxycodone as treatment agents.  These have not been beneficial and,  especially, the narcotic, places her at risk for dependency.    2.  To help mitigate the more acute pain, I provided her an alternate to the oxycodone by providing tramadol 50 mg, 2 tablets twice daily to see if this further mitigates her pain.  This is being chosen for its additional effects on serotonin and norepinephrine opposed to other opioid agents and how it helps block the mu opioid receptor.  We discussed dependency risk, however, this is less than would be seen with medication such as oxycodone.    3.  Based upon her response to the tramadol, we may continue this on a scheduled basis or consider Nucynta as it also helps with severe neuropathic pain.    4.  I may consider referring her to pain management for management of such medications, however, I would be willing to initiate these medications first in an effort to further mitigate her chronic pain.    5.  We discussed the possibility of Botox injections for her trigeminal neuralgia.  If she fails the other regimens listed on today's encounter, we may consider this next.    6.  I would like for the patient to see oral surgery for consideration of intra-articular corticosteroid injection to the bilateral TM joints given her severe TM joint dysfunction and pain.  Additionally, I recommended a nighttime bite guard, however, I would not consider a custom nightguard until her dentition is improved as she has rather carious teeth that would not be amenable to fitting her with a custom bite guard.  However, in the interim, I would like her to get an over-the-counter sports bite guard that she can mold to wear as a soft guard at nighttime to see if this will mitigate some of her facial pain.    7.  The patient voices understanding and agreement with the plan of care will call for any concerns or questions in the interim.  Greater than 45 minutes spent preparing the patient's visit in chart, reviewing past history and diagnostic studies including imaging and  laboratory evaluation, obtaining clinical history, performing physical examination, and counseling the patient on the prescribed plan of therapy and care, ordering diagnostic testing, making appropriate referrals, documenting the encounter and interpretation of results and coordination of care.          Jose Duarte PA-C  06/09/21  09:09 CDT

## 2021-06-30 DIAGNOSIS — G50.0 TRIGEMINAL NEURALGIA OF RIGHT SIDE OF FACE: ICD-10-CM

## 2021-07-02 RX ORDER — OXCARBAZEPINE 600 MG/1
600 TABLET, FILM COATED ORAL 3 TIMES DAILY
Qty: 180 TABLET | Refills: 2 | Status: SHIPPED | OUTPATIENT
Start: 2021-07-02 | End: 2021-12-17 | Stop reason: SDUPTHER

## 2021-07-02 RX ORDER — TRAMADOL HYDROCHLORIDE 50 MG/1
TABLET ORAL
Qty: 120 TABLET | Refills: 0 | Status: SHIPPED | OUTPATIENT
Start: 2021-07-02 | End: 2021-08-18

## 2021-07-07 ENCOUNTER — HOSPITAL ENCOUNTER (OUTPATIENT)
Dept: SLEEP MEDICINE | Facility: HOSPITAL | Age: 31
Discharge: HOME | End: 2021-07-07

## 2021-07-15 ENCOUNTER — OFFICE VISIT (OUTPATIENT)
Dept: NEUROLOGY | Facility: CLINIC | Age: 31
End: 2021-07-15

## 2021-07-15 VITALS
OXYGEN SATURATION: 97 % | DIASTOLIC BLOOD PRESSURE: 90 MMHG | BODY MASS INDEX: 46.3 KG/M2 | HEART RATE: 99 BPM | WEIGHT: 271.2 LBS | HEIGHT: 64 IN | SYSTOLIC BLOOD PRESSURE: 122 MMHG

## 2021-07-15 DIAGNOSIS — G50.0 TRIGEMINAL NEURALGIA OF RIGHT SIDE OF FACE: Primary | ICD-10-CM

## 2021-07-15 DIAGNOSIS — M26.629 TMJPDS (TEMPOROMANDIBULAR JOINT PAIN DYSFUNCTION SYNDROME): ICD-10-CM

## 2021-07-15 DIAGNOSIS — R51.9 RIGHT FACIAL PAIN: ICD-10-CM

## 2021-07-15 PROCEDURE — 99213 OFFICE O/P EST LOW 20 MIN: CPT | Performed by: PHYSICIAN ASSISTANT

## 2021-07-15 PROCEDURE — 96372 THER/PROPH/DIAG INJ SC/IM: CPT | Performed by: PHYSICIAN ASSISTANT

## 2021-07-15 RX ORDER — METHYLPREDNISOLONE 4 MG/1
TABLET ORAL
Qty: 1 EACH | Refills: 0 | Status: SHIPPED | OUTPATIENT
Start: 2021-07-15 | End: 2021-07-20

## 2021-07-15 RX ORDER — NORTRIPTYLINE HYDROCHLORIDE 25 MG/1
CAPSULE ORAL
Qty: 60 CAPSULE | Refills: 0 | Status: SHIPPED | OUTPATIENT
Start: 2021-07-15 | End: 2021-08-18 | Stop reason: SDUPTHER

## 2021-07-15 RX ORDER — TRIAMCINOLONE ACETONIDE 40 MG/ML
40 INJECTION, SUSPENSION INTRA-ARTICULAR; INTRAMUSCULAR ONCE
Status: COMPLETED | OUTPATIENT
Start: 2021-07-15 | End: 2021-07-15

## 2021-07-15 RX ADMIN — TRIAMCINOLONE ACETONIDE 40 MG: 40 INJECTION, SUSPENSION INTRA-ARTICULAR; INTRAMUSCULAR at 11:34

## 2021-07-15 NOTE — PROGRESS NOTES
Neurology Progress Note      Chief Complaint:    Right-sided facial pain  Trigeminal neuralgia  Pituitary adenoma  Sleep apnea    Subjective     Subjective:  Patient returns clinic today for follow-up evaluation of persistent right-sided facial pain.  Patient states she began experiencing an exacerbation of this several days ago and despite her current medication regimen is in considerable pain.    Patient is currently managed with Lyrica 200 mg 3 times daily, tramadol 50 mg, 2 tablets twice daily, Trileptal 600 mg 3 times daily.    The patient localizes her pain in the distribution of the axillary branch of the trigeminal nerve.  She has pain that affects mastication as well as pain along the right side of the nose and right infraorbital region.      Past Medical History:   Diagnosis Date   • Contraception    • Dyspareunia    • Hypertension    • Insulin resistance    • IUD check up    • Ovarian cyst    • PCOS (polycystic ovarian syndrome)    • Trigeminal neuralgia    • Vitamin D deficiency      Past Surgical History:   Procedure Laterality Date   •  SECTION N/A 1/10/2018    Procedure:  SECTION PRIMARY;  Surgeon: Soham Osorio MD;  Location: [facility] LABOR DELIVERY;  Service:    • TONSILLECTOMY AND ADENOIDECTOMY      hemorrhaged with surgical repair 3 days after surgery   • WISDOM TOOTH EXTRACTION       Family History   Problem Relation Age of Onset   • No Known Problems Mother    • No Known Problems Father    • No Known Problems Maternal Grandmother    • No Known Problems Maternal Grandfather    • Leukemia Paternal Grandmother    • No Known Problems Paternal Grandfather      Social History     Tobacco Use   • Smoking status: Never Smoker   • Smokeless tobacco: Never Used   Vaping Use   • Vaping Use: Never used   Substance Use Topics   • Alcohol use: Yes     Comment: occasional   • Drug use: No       Medications:  Current Outpatient Medications   Medication Sig Dispense Refill   • LO LOESTRIN FE 1  "MG-10 MCG / 10 MCG tablet TAKE 1 TABLET BY MOUTH ONCE DAILY 28 tablet 6   • Lyrica 200 MG capsule Take 1 capsule by mouth 3 (Three) Times a Day. 90 capsule 2   • OXcarbazepine (Trileptal) 600 MG tablet Take 1 tablet by mouth 3 (Three) Times a Day. 180 tablet 2   • traMADol (Ultram) 50 MG tablet 2 tabs po  tablet 0     No current facility-administered medications for this visit.       Allergies:    Ibuprofen    Review of Systems:   -A 14 point review of systems is completed and is negative.      Objective      Vital Signs  Heart Rate:  [99] 99  BP: (122)/(90) 122/90    Physical Exam:    General Exam:  Head:  Normocephalic, atraumatic.  HEENT: PERRLA.  Full EOM.  Patient is very guarded and speaking.  She refuses to open the mouth significantly.  Neck:  No lymphadenopathy, thyromegaly or bruit.  Cardiac:  Regular rate and rhythm.  Normal S1, S2.  No murmur, rub or gallop.  Lungs:  Clear to auscultation bilaterally.  No wheeze, rales or rhonchi.  Abdomen:  Non-tender, Non-distended.  Bowel sounds normoactive.  Extremities: Full peripheral pulses.  No clubbing, cyanosis or edema.  Skin: No ulceration, breakdown or rash.  Exquisitely tender to very light touch on the right side of face.        Assessment/Plan     Impression:  1.  Right-sided facial pain  2.  Trigeminal neuralgia, right  3.  Observed sleep apnea  4.  Bruxism  5.  Poor oral hygiene/diffusely carious teeth  6.  TM joint dysfunction, bilateral      Plan:  1.  No change in current, existing medications.    2.  Addition of nortriptyline 25-50 mg nightly as this is her most painful time of the day and has difficulty resting.    3.  Kenalog 40 mg intramuscular injection followed by Medrol 4 mg Dosepak.  I advised that this is not my usual approach, however, patient insisted that her previous neurologist had administered this and this would oftentimes \"take the edge off\" for her.    4.  Referral to [facility] atypical facial pain clinic for " further evaluation and recommendations on treatment.  Locally, neurosurgery nor radiation oncology treats trigeminal neuralgia.    5.  I will work on seeing if we can get Botox approved by insurance to treat her atypical facial pain.  If I can, we will administer Botox to help mitigate this pain.    The patient voices understanding and agreement with plan of care will call for any concerns or questions in the interim.              Jose [place] PA-C  07/15/21  11:14 CDT

## 2021-07-20 ENCOUNTER — TELEPHONE (OUTPATIENT)
Dept: NEUROLOGY | Age: 31
End: 2021-07-20

## 2021-07-20 ENCOUNTER — OFFICE VISIT (OUTPATIENT)
Dept: FAMILY MEDICINE CLINIC | Facility: CLINIC | Age: 31
End: 2021-07-20

## 2021-07-20 VITALS
BODY MASS INDEX: 46.26 KG/M2 | HEIGHT: 64 IN | RESPIRATION RATE: 16 BRPM | WEIGHT: 271 LBS | DIASTOLIC BLOOD PRESSURE: 86 MMHG | TEMPERATURE: 97.5 F | HEART RATE: 93 BPM | SYSTOLIC BLOOD PRESSURE: 124 MMHG | OXYGEN SATURATION: 98 %

## 2021-07-20 DIAGNOSIS — E66.01 CLASS 3 SEVERE OBESITY WITHOUT SERIOUS COMORBIDITY WITH BODY MASS INDEX (BMI) OF 45.0 TO 49.9 IN ADULT, UNSPECIFIED OBESITY TYPE (HCC): ICD-10-CM

## 2021-07-20 DIAGNOSIS — L98.9 SKIN LESION: ICD-10-CM

## 2021-07-20 DIAGNOSIS — G50.0 TRIGEMINAL NEURALGIA OF RIGHT SIDE OF FACE: ICD-10-CM

## 2021-07-20 PROBLEM — Z34.90 TERM PREGNANCY: Status: RESOLVED | Noted: 2018-01-10 | Resolved: 2021-07-20

## 2021-07-20 PROBLEM — Z34.90 SUPERVISION OF NORMAL PREGNANCY: Status: RESOLVED | Noted: 2017-12-16 | Resolved: 2021-07-20

## 2021-07-20 PROCEDURE — 99213 OFFICE O/P EST LOW 20 MIN: CPT | Performed by: FAMILY MEDICINE

## 2021-07-20 NOTE — PROGRESS NOTES
Subjective   Jaquelin Rivera is a 30 y.o. female presenting with chief complaint of:   Chief Complaint   Patient presents with   • Annual Exam   • Skin Lesion     on back       History of Present Illness :  Alone.       Has few chronic problems to consider that might have a bearing on today's issues; not an interval appointment.       Chronic/acute problems reviewed today:   1. Skin lesion acute issue of a 1 to 2-week lesion on her left lower back.  It has drained minimally.   2. Class 3 severe obesity without serious comorbidity with body mass index (BMI) of 45.0 to 49.9 in adult, unspecified obesity type (CMS/HCC) Chronic/stable.  Aware, advised weight loss before.  On/off some success with weight loss but often with weight gain back.      3. Trigeminal neuralgia of right side of face chronic stable but no better; has been working with [facility] and there wanting to refer her to the [facility] facial nerve clinic and [facility].     Has an/another acute issue today: none.    The following portions of the patient's history were reviewed and updated as appropriate: allergies, current medications, past family history, past medical history, past social history, past surgical history and problem list.      Current Outpatient Medications:   •  LO LOESTRIN FE 1 MG-10 MCG / 10 MCG tablet, TAKE 1 TABLET BY MOUTH ONCE DAILY, Disp: 28 tablet, Rfl: 6  •  Lyrica 200 MG capsule, Take 1 capsule by mouth 3 (Three) Times a Day., Disp: 90 capsule, Rfl: 2  •  nortriptyline (Pamelor) 25 MG capsule, 1-2 capsules at bedtime for sleep/nerve pain, Disp: 60 capsule, Rfl: 0  •  OXcarbazepine (Trileptal) 600 MG tablet, Take 1 tablet by mouth 3 (Three) Times a Day., Disp: 180 tablet, Rfl: 2  •  traMADol (Ultram) 50 MG tablet, 2 tabs po BID, Disp: 120 tablet, Rfl: 0    No problems with medications.    Allergies   Allergen Reactions   • Ibuprofen      MAKES THROAT BURN       Review of Systems  GENERAL:  Active home/work  when no flares. Sleep is ok generally. No fever now/recent.  SKIN: No rash/skin lesion of concern (unless above/below)  ENDO:  No syncope, near or diaphoretic sweaty spells.  BS Ok past/even with past pregnancy.  HEENT: No recent head injury; occ/severe trigeminal neuralgia-R headache.  No vision change.  No significant hearing loss.  Ears without pain/drainage.  No sore throat.  No significant nasal/sinus congestion/drainage. No epistaxis.  CHEST: No chest wall tenderness or mass. No cough, without wheeze.  No SOB; no hemoptysis.  CV: No exertional chest pain, palpitations, ankle edema.  GI: No dysphagia or heartburn.  No abdominal pain, diarrhea, constipation.  No rectal bleeding, or melena.    :  Voids without dysuria; incontinence to completion.  ORTHO: No painful/swollen joints but various on /off sore.  No sore neck or back.  No acute neck or back pain without recent injury.  NEURO: No focal/significant weakness of extremities. No dizziness.   No numbness/paresthesias.   PSYCH: No memory loss.  Mood/variable; occ anxious, depressed but/and not suicidal.  Tries to tolerate stress .   Screening:  Mammogram: NA  Bone density: NA  Low dose CT chest: NA  GI: NA  Prostate: NA  Usual lab order: 12m CBC, CMP, LIPID, TSH    Data reviewed:   Recent admit/ER/MD visits: -neurology    Lab Results:  Results for orders placed or performed in visit on 04/08/21   ADH    Specimen: Blood   Result Value Ref Range    ADH <0.8 0.0 - 4.7 pg/mL   TSH    Specimen: Blood   Result Value Ref Range    TSH 2.070 0.270 - 4.200 uIU/mL   T4, free    Specimen: Blood   Result Value Ref Range    Free T4 0.69 (L) 0.93 - 1.70 ng/dL   Prolactin    Specimen: Blood   Result Value Ref Range    Prolactin 20.50 4.79 - 23.30 ng/mL   FSH & LH    Specimen: Blood   Result Value Ref Range    FSH 7.43 mIU/mL    LH 9.78 mIU/mL   Hemoglobin A1c    Specimen: Blood   Result Value Ref Range    Hemoglobin A1C 4.42 (L) 4.80 - 5.60 %   Growth Hormone    Specimen:  "Blood   Result Value Ref Range    Growth Hormone 0.5 0.0 - 10.0 ng/mL       A1C:  Lab Results - Last 18 Months   Lab Units 04/08/21  0810   HEMOGLOBIN A1C % 4.42*     LIPID:No results for input(s): CHLPL, LDL, HDL, TRIG in the last 59771 hours.  PSA:No results for input(s): PSA in the last 58408 hours.  CBC:No results for input(s): WBC, HGB, HCT, PLT, IRONSERUM, IRON in the last 94262 hours.   BMP/CMP:No results for input(s): NA, K, CL, CO2, GLU, BUN, CREATININE, EGFRIFNONA, EGFRIFAFRI, CALCIUM in the last 35436 hours.  HEPATIC:No results for input(s): ALT, AST, ALKPHOS, TOTAL in the last 96100 hours.    Invalid input(s): HEPATITSC  THYROID:  Lab Results - Last 18 Months   Lab Units 04/08/21  0810   TSH uIU/mL 2.070   FREE T4 ng/dL 0.69*       Objective   /86 (BP Location: Left arm)   Pulse 93   Temp 97.5 °F (36.4 °C)   Resp 16   Ht 162.6 cm (64\")   Wt 123 kg (271 lb)   SpO2 98%   BMI 46.52 kg/m²   Body mass index is 46.52 kg/m².    Recent Vitals       6/8/2021 7/15/2021 7/20/2021       BP:  130/86  122/90  124/86     Pulse:  79  99  93     Temp:  --  --  97.5 °F (36.4 °C)     Weight:  120 kg (265 lb)  123 kg (271 lb 3.2 oz)  123 kg (271 lb)     BMI (Calculated):  45.5  46.5  46.5           Physical Exam  GENERAL:  Well nourished/developed appears mildly uncomfortable.  SKIN: Turgor excellent, without wound, rash, lesion other than ready right facial complexion.  HEENT: Normal cephalic without trauma but significantly sore to palpation the right trigeminal distribution/.  Pupils equal round reactive to light. Extraocular motions full without nystagmus.  Sclera on the right seems slightly injected there is a little mild amount of hearing on and off.  External canals nonobstructive nontender without reddness. Tymphatic membranes lamont with venessa structures intact.   Oral cavity without growths, exudates, and moist.  Posterior pharynx without mass, obstruction, redness.  No thyromegaly, mass, tenderness, " lymphadenopathy and supple.  CV: Regular rhythm.  No murmur, gallop,  edema. Posterior pulses intact.  No carotid bruits.  CHEST: No chest wall tenderness or mass.   LUNGS: Symmetric motion with clear to auscultation.  ABD: Soft, nontender without mass.   ORTHO: Symmetric extremities without swelling/point tenderness.  Full gross range of motion.  NEURO: CN 2-12 grossly intact.  Symmetric facies and UE/LE. 3/5 strength throughout. 1/4 x bicep equal reflexes.  Nonfocal use extremities. Speech clear. Intact light touch with monofilament, vibratory sensation with tuning fork; equal toes/distal feet.    PSYCH: Oriented x 3.  Pleasant calm, well kept.  Purposeful/directed conservation with intact short/long gross memory.     Assessment/Plan     1. Skin lesion    2. Class 3 severe obesity without serious comorbidity with body mass index (BMI) of 45.0 to 49.9 in adult, unspecified obesity type (CMS/HCC)    3. Trigeminal neuralgia of right side of face        Discussion:  Work with facial nerve first; once done  Consider Rx for obesity    Medical decision issues:   Data review above:   Rx: reviewed and decisions:   Same Rx for now     Orders placed:   LAB/Testing/Referrals: reviewed/orders:   Today:   No orders of the defined types were placed in this encounter.    Chronic/recurrent labs above or change to:   same     Health maintenance:   Body mass index is 46.52 kg/m².  Patient's Body mass index is 46.52 kg/m². indicating that she is morbidly obese (BMI > 40 or > 35 with obesity - related health condition). Obesity-related health conditions include the following: osteoarthritis. Obesity is unchanged. BMI is is above average; BMI management plan is completed. We discussed portion control and increasing exercise..    Tobacco use reviewed:    Jaquelin BERNARD Miguel  reports that she has never smoked. She has never used smokeless tobacco..    There are no Patient Instructions on file for this visit.    Follow up: Return for  return when Mickey is done.  Future Appointments   Date Time Provider Department Center   8/13/2021  3:15 PM Joes [place] PA-C [facility]   9/15/2021  8:00 PM  [facility] SLEEP ROOM [address] [facility] SLEEP PAD

## 2021-07-29 ENCOUNTER — TELEPHONE (OUTPATIENT)
Dept: NEUROLOGY | Facility: CLINIC | Age: 31
End: 2021-07-29

## 2021-08-10 NOTE — TELEPHONE ENCOUNTER
Caller: DAVID    Relationship: W/ [facility]    Best call back number: [phone]    What was the call regarding: DAVID CALLED TO CHECK ON THE STATUS OF PREVIOUS ENCOUNTER. APOLOGIZED TO DAVID ABOUT THE DELAYED RESPONSE. DAVID STATES THEY RECEIVED REF DOCUMENT FOR PT, HOWEVER, THEY HAVE NO DEMOGRAPHICS OR INSURANCE INFORMATION REGARDING PT. STATES PT HAS BEEN SCHEDULED FOR NP APPT WITH DR. ALFREDO PEREZ WITH  NEUROLOGY ON April 13TH, 2022 AT 10:00AM. DAVID IS UNSURE IF PT IS AWARE OF THE APPT AS SHE DID NOT SEE INDICATION THAT SHE WAS DURING OUR CALL.    PLEASE FAX DEMOGRAPHICS, INSURANCE, LAST OV NOTE, AND MRI'S TO [phone].    PLEASE REVIEW AND ADVISE.

## 2021-08-18 DIAGNOSIS — M26.629 TMJPDS (TEMPOROMANDIBULAR JOINT PAIN DYSFUNCTION SYNDROME): ICD-10-CM

## 2021-08-18 DIAGNOSIS — G50.0 TRIGEMINAL NEURALGIA OF RIGHT SIDE OF FACE: ICD-10-CM

## 2021-08-18 DIAGNOSIS — R51.9 RIGHT FACIAL PAIN: ICD-10-CM

## 2021-08-18 RX ORDER — CELECOXIB 100 MG/1
100 CAPSULE ORAL DAILY
Qty: 30 CAPSULE | Refills: 2 | Status: SHIPPED | OUTPATIENT
Start: 2021-08-18 | End: 2021-11-23

## 2021-08-18 RX ORDER — NORTRIPTYLINE HYDROCHLORIDE 50 MG/1
CAPSULE ORAL
Qty: 60 CAPSULE | Refills: 0 | Status: SHIPPED | OUTPATIENT
Start: 2021-08-18 | End: 2021-08-26 | Stop reason: SDUPTHER

## 2021-08-25 ENCOUNTER — HOSPITAL ENCOUNTER (EMERGENCY)
Facility: HOSPITAL | Age: 31
Discharge: HOME | End: 2021-08-25
Attending: EMERGENCY MEDICINE | Admitting: EMERGENCY MEDICINE

## 2021-08-25 VITALS
RESPIRATION RATE: 17 BRPM | HEART RATE: 102 BPM | TEMPERATURE: 98.6 F | WEIGHT: 272 LBS | BODY MASS INDEX: 46.44 KG/M2 | DIASTOLIC BLOOD PRESSURE: 94 MMHG | SYSTOLIC BLOOD PRESSURE: 147 MMHG | OXYGEN SATURATION: 96 % | HEIGHT: 64 IN

## 2021-08-25 DIAGNOSIS — G50.0 TRIGEMINAL NEURALGIA OF RIGHT SIDE OF FACE: Primary | ICD-10-CM

## 2021-08-25 PROCEDURE — 25010000003 METHYLPREDNISOLONE PER 125 MG: Performed by: EMERGENCY MEDICINE

## 2021-08-25 PROCEDURE — 99283 EMERGENCY DEPT VISIT LOW MDM: CPT

## 2021-08-25 PROCEDURE — 96365 THER/PROPH/DIAG IV INF INIT: CPT

## 2021-08-25 RX ORDER — HYDROCODONE BITARTRATE AND ACETAMINOPHEN 10; 325 MG/1; MG/1
1 TABLET ORAL ONCE
Status: COMPLETED | OUTPATIENT
Start: 2021-08-25 | End: 2021-08-25

## 2021-08-25 RX ADMIN — HYDROCODONE BITARTRATE AND ACETAMINOPHEN 1 TABLET: 10; 325 TABLET ORAL at 20:37

## 2021-08-25 RX ADMIN — SODIUM CHLORIDE 1000 MG: 9 INJECTION, SOLUTION INTRAVENOUS at 21:06

## 2021-08-26 ENCOUNTER — TELEPHONE (OUTPATIENT)
Dept: NEUROLOGY | Facility: CLINIC | Age: 31
End: 2021-08-26

## 2021-08-26 DIAGNOSIS — G50.0 TRIGEMINAL NEURALGIA OF RIGHT SIDE OF FACE: ICD-10-CM

## 2021-08-26 DIAGNOSIS — R51.9 RIGHT FACIAL PAIN: ICD-10-CM

## 2021-08-26 DIAGNOSIS — M26.629 TMJPDS (TEMPOROMANDIBULAR JOINT PAIN DYSFUNCTION SYNDROME): ICD-10-CM

## 2021-08-26 RX ORDER — NORTRIPTYLINE HYDROCHLORIDE 75 MG/1
75 CAPSULE ORAL 2 TIMES DAILY
Qty: 60 CAPSULE | Refills: 0 | Status: SHIPPED | OUTPATIENT
Start: 2021-08-26 | End: 2021-08-30 | Stop reason: SDUPTHER

## 2021-08-26 NOTE — TELEPHONE ENCOUNTER
Jaquelin notified Jose said she needs to allow more time for Solu-Medrol to work.  He also increased Nortriptyline.

## 2021-08-26 NOTE — TELEPHONE ENCOUNTER
Caller: FANNIE     Relationship:      Best call back number: [phone]    What is the best time to reach you: ANY     What was the call regarding: RT SIDE OF FACE PAIN  RATES A #10    Do you require a callback  : YES PT  IS CALLING , STATES MERARI IS NOT ANY BETTER PAIN IS A 10 GOING UP RT SIDE OF HER FACE,   PLEASE CALL AND ADVISE       PLEASE ADVISE

## 2021-08-30 ENCOUNTER — TELEPHONE (OUTPATIENT)
Dept: NEUROLOGY | Facility: CLINIC | Age: 31
End: 2021-08-30

## 2021-08-30 DIAGNOSIS — G50.0 TRIGEMINAL NEURALGIA OF RIGHT SIDE OF FACE: ICD-10-CM

## 2021-08-30 DIAGNOSIS — G43.019 INTRACTABLE MIGRAINE WITHOUT AURA AND WITHOUT STATUS MIGRAINOSUS: Primary | ICD-10-CM

## 2021-08-30 DIAGNOSIS — M26.629 TMJPDS (TEMPOROMANDIBULAR JOINT PAIN DYSFUNCTION SYNDROME): ICD-10-CM

## 2021-08-30 DIAGNOSIS — R51.9 RIGHT FACIAL PAIN: ICD-10-CM

## 2021-08-30 RX ORDER — SODIUM CHLORIDE 9 MG/ML
500 INJECTION, SOLUTION INTRAVENOUS ONCE
Status: CANCELLED | OUTPATIENT
Start: 2021-08-30 | End: 2021-08-30

## 2021-08-30 RX ORDER — NORTRIPTYLINE HYDROCHLORIDE 75 MG/1
75 CAPSULE ORAL 2 TIMES DAILY
Qty: 60 CAPSULE | Refills: 0 | Status: CANCELLED | OUTPATIENT
Start: 2021-08-30

## 2021-08-30 RX ORDER — DEXAMETHASONE SODIUM PHOSPHATE 10 MG/ML
10 INJECTION INTRAMUSCULAR; INTRAVENOUS ONCE
Status: DISCONTINUED | OUTPATIENT
Start: 2021-08-30 | End: 2021-08-30

## 2021-08-30 RX ORDER — DEXAMETHASONE SODIUM PHOSPHATE 10 MG/ML
10 INJECTION INTRAMUSCULAR; INTRAVENOUS ONCE
Status: CANCELLED
Start: 2021-08-30 | End: 2021-08-30

## 2021-08-30 RX ORDER — PROCHLORPERAZINE EDISYLATE 5 MG/ML
10 INJECTION INTRAMUSCULAR; INTRAVENOUS ONCE
Status: CANCELLED | OUTPATIENT
Start: 2021-08-30

## 2021-08-30 RX ORDER — DIPHENHYDRAMINE HYDROCHLORIDE 50 MG/ML
50 INJECTION INTRAMUSCULAR; INTRAVENOUS ONCE
Status: CANCELLED | OUTPATIENT
Start: 2021-08-30

## 2021-08-30 RX ORDER — HYDROMORPHONE HYDROCHLORIDE 1 MG/ML
1 INJECTION, SOLUTION INTRAMUSCULAR; INTRAVENOUS; SUBCUTANEOUS ONCE
Status: CANCELLED | OUTPATIENT
Start: 2021-08-30

## 2021-08-30 RX ORDER — NORTRIPTYLINE HYDROCHLORIDE 75 MG/1
75 CAPSULE ORAL 2 TIMES DAILY
Qty: 60 CAPSULE | Refills: 0 | Status: SHIPPED | OUTPATIENT
Start: 2021-08-30 | End: 2021-11-23

## 2021-08-30 RX ORDER — LORAZEPAM 2 MG/ML
1 INJECTION INTRAMUSCULAR ONCE
Status: CANCELLED | OUTPATIENT
Start: 2021-08-30

## 2021-08-30 NOTE — TELEPHONE ENCOUNTER
Jaquelin notified of appointment for infusion tomorrow.  She needs to register in the cancer center at 1:00 tomorrow.

## 2021-08-30 NOTE — TELEPHONE ENCOUNTER
"----- Message from Jose [place] PA-C sent at 8/30/2021  1:11 PM CDT -----  I resent the Rx.  I would suggest we have her go to the infusion center for a \"migraine cocktail\" as I can administer medication to her through the IV for pain relief as well as additional steroid.  She will need a .  Let me know if she is agreeable and I will enter orders.    mb  ----- Message -----  From: Mel Watson LPN  Sent: 8/30/2021  12:27 PM CDT  To: Jose [place] PA-C    Jaquelin said Solu-Medrol helped until yesterday afternoon.  She has pain on the right side of her nose to the upper part of her forehead, around her eye and the right side of her jaw.  She has sharp electric type pain followed by constant deep throbbing pain.  The shocking and burning alternate.  The script for Nortriptyline was sent to the wrong pharmacy.  Received a message from the hub, her mother requested a script for steroids.      "

## 2021-08-30 NOTE — TELEPHONE ENCOUNTER
Jaquelin said she would like to try the migraine cocktail.  Notified she will need someone to drive.  Also notified the previous script was sent to [facility], a new script has been sent to [facility].

## 2021-08-30 NOTE — TELEPHONE ENCOUNTER
Provider: MYRTLE FRANCOIS  Caller: APARNA EMANUEL  Relationship to Patient: MOTHER  Pharmacy: [facility]  Phone Number: [phone]    Reason for Call: PTS IN A LOT OF PAIN AGAIN- HER T.N- Trigeminal Neuralgia IS ACTING UP AGAIN. HER STERIODS FROM THE ED WORE OFF, HER MOTHER IS CALLING REQUESTING A STERIOD BE CALLED IN FOR THE PATIENT.  SHE ALSO SAID THAT MYRTLE FRANCOIS WAS GOING TO CHANGE THE DOSAGE OR SOMETHING ON THE NORTRIPTYLINE BUT IT WAS NEVER CALLED IN-    PTS MOM SAID PT IS HAVING HARD TIME SPEAKING AND EATING.  PTS CALL  #- [phone]      Caller: IRVING EMANUEL    Relationship: Mother    When do you need the refill by: ASAP    What additional details did the patient provide when requesting the medication: PT NEEDS STEROID AND NORTRIPTYLINE       What is the patient's preferred pharmacy: [id number] - [place], [place] - [address] AT [address] [facility] - [phone] [facility] [phone] FX

## 2021-08-31 ENCOUNTER — INFUSION (OUTPATIENT)
Dept: ONCOLOGY | Facility: HOSPITAL | Age: 31
End: 2021-08-31

## 2021-08-31 VITALS
DIASTOLIC BLOOD PRESSURE: 64 MMHG | HEIGHT: 64 IN | RESPIRATION RATE: 16 BRPM | TEMPERATURE: 97.3 F | HEART RATE: 99 BPM | SYSTOLIC BLOOD PRESSURE: 112 MMHG | BODY MASS INDEX: 44.39 KG/M2 | WEIGHT: 260 LBS | OXYGEN SATURATION: 97 %

## 2021-08-31 DIAGNOSIS — G43.019 INTRACTABLE MIGRAINE WITHOUT AURA AND WITHOUT STATUS MIGRAINOSUS: Primary | ICD-10-CM

## 2021-08-31 PROCEDURE — 25010000002 DIPHENHYDRAMINE PER 50 MG

## 2021-08-31 PROCEDURE — 25010000003 HYDROMORPHONE 1 MG/ML SOLUTION: Performed by: PHYSICIAN ASSISTANT

## 2021-08-31 PROCEDURE — 25010000002 PROCHLORPERAZINE 10 MG/2ML SOLUTION

## 2021-08-31 PROCEDURE — 25010000002 LORAZEPAM PER 2 MG: Performed by: PHYSICIAN ASSISTANT

## 2021-08-31 PROCEDURE — 25010000002 DEXAMETHASONE PER 1 MG: Performed by: PHYSICIAN ASSISTANT

## 2021-08-31 PROCEDURE — 96374 THER/PROPH/DIAG INJ IV PUSH: CPT

## 2021-08-31 PROCEDURE — 25010000002 DIPHENHYDRAMINE PER 50 MG: Performed by: PHYSICIAN ASSISTANT

## 2021-08-31 PROCEDURE — 25010000002 DEXAMETHASONE PER 1 MG

## 2021-08-31 PROCEDURE — 96375 TX/PRO/DX INJ NEW DRUG ADDON: CPT

## 2021-08-31 PROCEDURE — 25010000002 LORAZEPAM PER 2 MG

## 2021-08-31 PROCEDURE — 96361 HYDRATE IV INFUSION ADD-ON: CPT

## 2021-08-31 PROCEDURE — 25010000002 PROCHLORPERAZINE 10 MG/2ML SOLUTION: Performed by: PHYSICIAN ASSISTANT

## 2021-08-31 PROCEDURE — 25010000002 HYDROMORPHONE 1 MG/ML SOLUTION

## 2021-08-31 RX ORDER — DEXAMETHASONE SODIUM PHOSPHATE 10 MG/ML
10 INJECTION INTRAMUSCULAR; INTRAVENOUS ONCE
Status: CANCELLED
Start: 2021-08-31 | End: 2021-08-31

## 2021-08-31 RX ORDER — SODIUM CHLORIDE 9 MG/ML
500 INJECTION, SOLUTION INTRAVENOUS ONCE
Status: CANCELLED | OUTPATIENT
Start: 2021-08-31 | End: 2021-08-31

## 2021-08-31 RX ORDER — PROCHLORPERAZINE EDISYLATE 5 MG/ML
10 INJECTION INTRAMUSCULAR; INTRAVENOUS ONCE
Status: COMPLETED | OUTPATIENT
Start: 2021-08-31 | End: 2021-08-31

## 2021-08-31 RX ORDER — DEXAMETHASONE SODIUM PHOSPHATE 10 MG/ML
10 INJECTION INTRAMUSCULAR; INTRAVENOUS ONCE
Status: COMPLETED | OUTPATIENT
Start: 2021-08-31 | End: 2021-08-31

## 2021-08-31 RX ORDER — SODIUM CHLORIDE 9 MG/ML
500 INJECTION, SOLUTION INTRAVENOUS ONCE
Status: COMPLETED | OUTPATIENT
Start: 2021-08-31 | End: 2021-08-31

## 2021-08-31 RX ORDER — LORAZEPAM 2 MG/ML
1 INJECTION INTRAMUSCULAR ONCE
Status: COMPLETED | OUTPATIENT
Start: 2021-08-31 | End: 2021-08-31

## 2021-08-31 RX ORDER — PROCHLORPERAZINE EDISYLATE 5 MG/ML
10 INJECTION INTRAMUSCULAR; INTRAVENOUS ONCE
Status: CANCELLED | OUTPATIENT
Start: 2021-08-31

## 2021-08-31 RX ORDER — DIPHENHYDRAMINE HYDROCHLORIDE 50 MG/ML
50 INJECTION INTRAMUSCULAR; INTRAVENOUS ONCE
Status: COMPLETED | OUTPATIENT
Start: 2021-08-31 | End: 2021-08-31

## 2021-08-31 RX ORDER — LORAZEPAM 2 MG/ML
1 INJECTION INTRAMUSCULAR ONCE
Status: CANCELLED | OUTPATIENT
Start: 2021-08-31

## 2021-08-31 RX ORDER — DIPHENHYDRAMINE HYDROCHLORIDE 50 MG/ML
50 INJECTION INTRAMUSCULAR; INTRAVENOUS ONCE
Status: CANCELLED | OUTPATIENT
Start: 2021-08-31

## 2021-08-31 RX ADMIN — LORAZEPAM 1 MG: 2 INJECTION INTRAMUSCULAR; INTRAVENOUS at 14:22

## 2021-08-31 RX ADMIN — SODIUM CHLORIDE 500 ML: 9 INJECTION, SOLUTION INTRAVENOUS at 14:13

## 2021-08-31 RX ADMIN — DEXAMETHASONE SODIUM PHOSPHATE 10 MG: 10 INJECTION INTRAMUSCULAR; INTRAVENOUS at 14:29

## 2021-08-31 RX ADMIN — DIPHENHYDRAMINE HYDROCHLORIDE 50 MG: 50 INJECTION, SOLUTION INTRAMUSCULAR; INTRAVENOUS at 14:14

## 2021-08-31 RX ADMIN — HYDROMORPHONE HYDROCHLORIDE 1 MG: 1 INJECTION, SOLUTION INTRAMUSCULAR; INTRAVENOUS; SUBCUTANEOUS at 14:17

## 2021-08-31 RX ADMIN — PROCHLORPERAZINE EDISYLATE 10 MG: 5 INJECTION INTRAMUSCULAR; INTRAVENOUS at 14:26

## 2021-09-08 ENCOUNTER — OFFICE VISIT (OUTPATIENT)
Dept: FAMILY MEDICINE CLINIC | Facility: CLINIC | Age: 31
End: 2021-09-08

## 2021-09-08 VITALS — RESPIRATION RATE: 18 BRPM | TEMPERATURE: 98.2 F | HEART RATE: 120 BPM | OXYGEN SATURATION: 98 %

## 2021-09-08 DIAGNOSIS — J02.0 STREP PHARYNGITIS: ICD-10-CM

## 2021-09-08 DIAGNOSIS — J02.9 ACUTE PHARYNGITIS, UNSPECIFIED ETIOLOGY: Primary | ICD-10-CM

## 2021-09-08 LAB
EXPIRATION DATE: ABNORMAL
INTERNAL CONTROL: ABNORMAL
Lab: ABNORMAL
S PYO AG THROAT QL: POSITIVE

## 2021-09-08 PROCEDURE — 87880 STREP A ASSAY W/OPTIC: CPT | Performed by: NURSE PRACTITIONER

## 2021-09-08 PROCEDURE — 99213 OFFICE O/P EST LOW 20 MIN: CPT | Performed by: NURSE PRACTITIONER

## 2021-09-08 RX ORDER — AMOXICILLIN 500 MG/1
500 TABLET, FILM COATED ORAL 3 TIMES DAILY
Qty: 30 TABLET | Refills: 0 | Status: SHIPPED | OUTPATIENT
Start: 2021-09-08 | End: 2021-09-18

## 2021-09-08 NOTE — PROGRESS NOTES
Subjective   Chief Complaint:  Sore throat    History of Present Illness:  This 30 y.o. female was seen in the office today.  Sore throat x 3-4 days.  x1 covid vaccine has been given.  Neg for covid at work yesterday-already tested.  Works at [employer].    Allergies   Allergen Reactions   • Ibuprofen      MAKES THROAT BURN      Current Outpatient Medications on File Prior to Visit   Medication Sig   • celecoxib (CeleBREX) 100 MG capsule Take 1 capsule by mouth Daily.   • LO LOESTRIN FE 1 MG-10 MCG / 10 MCG tablet TAKE 1 TABLET BY MOUTH ONCE DAILY   • Lyrica 200 MG capsule Take 1 capsule by mouth 3 (Three) Times a Day.   • nortriptyline (Pamelor) 75 MG capsule Take 1 capsule by mouth 2 (Two) Times a Day.   • OXcarbazepine (Trileptal) 600 MG tablet Take 1 tablet by mouth 3 (Three) Times a Day.     No current facility-administered medications on file prior to visit.      Past Medical, Surgical, Social, and Family History:  Past Medical History:   Diagnosis Date   • Contraception    • Dyspareunia    • Hypertension    • Insulin resistance    • Intractable migraine without aura and without status migrainosus 2021   • Intractable migraine without aura and without status migrainosus 2021   • IUD check up    • Ovarian cyst    • PCOS (polycystic ovarian syndrome)    • Trigeminal neuralgia    • Vitamin D deficiency      Past Surgical History:   Procedure Laterality Date   •  SECTION N/A 1/10/2018    Procedure:  SECTION PRIMARY;  Surgeon: Soham Osorio MD;  Location: [facility] LABOR DELIVERY;  Service:    • TONSILLECTOMY AND ADENOIDECTOMY      hemorrhaged with surgical repair 3 days after surgery   • WISDOM TOOTH EXTRACTION       Social History     Socioeconomic History   • Marital status:      Spouse name: Not on file   • Number of children: Not on file   • Years of education: Not on file   • Highest education level: Not on file   Tobacco Use   • Smoking status: Never Smoker   • Smokeless  tobacco: Never Used   Vaping Use   • Vaping Use: Never used   Substance and Sexual Activity   • Alcohol use: Yes     Comment: occasional   • Drug use: No   • Sexual activity: Yes     Partners: Male     Birth control/protection: None, Pill     Family History   Problem Relation Age of Onset   • No Known Problems Mother    • No Known Problems Father    • No Known Problems Maternal Grandmother    • No Known Problems Maternal Grandfather    • Leukemia Paternal Grandmother    • No Known Problems Paternal Grandfather      Objective   Physical Exam  Vitals reviewed.   Constitutional:       General: She is not in acute distress.     Appearance: Normal appearance.   HENT:      Right Ear: Hearing and tympanic membrane normal.      Left Ear: Hearing and tympanic membrane normal.      Mouth/Throat:      Pharynx: Oropharyngeal exudate and posterior oropharyngeal erythema present.   Cardiovascular:      Rate and Rhythm: Normal rate and regular rhythm.   Pulmonary:      Effort: Pulmonary effort is normal.      Breath sounds: Normal breath sounds.     Pulse 120   Temp 98.2 °F (36.8 °C) (Infrared)   Resp 18   SpO2 98%     Lab Results   Component Value Date    RAPSCRN Positive (A) 09/08/2021     Assessment/Plan   Diagnoses and all orders for this visit:    1. Acute pharyngitis, unspecified etiology (Primary)  -     POC Rapid Strep A    2. Strep pharyngitis    Other orders  -     amoxicillin (AMOXIL) 500 MG tablet; Take 1 tablet by mouth 3 (Three) Times a Day for 10 days.  Dispense: 30 tablet; Refill: 0    Discussion:  Advised and educated plan of care.      Follow-up:  Return if symptoms worsen or fail to improve.    Electronically signed by ZITA Crenshaw, 09/08/21, 10:42 AM CDT.   No

## 2021-09-14 DIAGNOSIS — Z34.90 TERM PREGNANCY: ICD-10-CM

## 2021-09-14 DIAGNOSIS — G50.0 TRIGEMINAL NEURALGIA OF RIGHT SIDE OF FACE: ICD-10-CM

## 2021-09-14 DIAGNOSIS — M26.629 TMJPDS (TEMPOROMANDIBULAR JOINT PAIN DYSFUNCTION SYNDROME): ICD-10-CM

## 2021-09-14 DIAGNOSIS — R51.9 RIGHT FACIAL PAIN: ICD-10-CM

## 2021-09-14 RX ORDER — METHYLPREDNISOLONE 4 MG/1
TABLET ORAL
Qty: 1 EACH | Refills: 0 | Status: SHIPPED | OUTPATIENT
Start: 2021-09-14 | End: 2021-10-04

## 2021-09-14 RX ORDER — OXYCODONE AND ACETAMINOPHEN 7.5; 325 MG/1; MG/1
1 TABLET ORAL EVERY 4 HOURS PRN
Qty: 30 TABLET | Refills: 0 | Status: SHIPPED | OUTPATIENT
Start: 2021-09-14 | End: 2021-09-22

## 2021-09-14 NOTE — TELEPHONE ENCOUNTER
Yes FMLA  Yes pain Rx refill  Suggest referral to pain management now; hard to get into/could take awhile and may need long term  Yes medrol dose pack      Regarding: Prescription Question  Contact: [phone]  ----- Message from Nati Song MA sent at 9/14/2021  8:00 AM CDT -----  Please advise      ----- Message from Jaquelin Rivera to Josef Ramsey MD sent at 9/14/2021  8:32 AM -----   Dr Ramsey I'm having major Trigeminal neuralgia pain and swelling in the right side of my face which has continually been happening for the last several weeks now.  [facility] neurology has referred me to a specialist out in [place] and I see them next month.  I've had to go to [facility] a couple of times now for IV steroids but they are only a temporary fix without having a prednisone taper to continue at home after to help until I fully get past this flair up. Would you be willing to call me in another 20mg taper prescription like you have in the past?  I also was wondering if you might be will to prescribe me a refill on my pain medication I would also normally take to help get me through these types of flairs, I know at one time you had offered but I did not need it at the time.  I've mentioned to Dr. [place]'s office multiple times since he took me off them that the medications he has tried since are not working but I never feel like I get heard except to have a dose increase on   first the Tramadol and now the Nortryptaline. I'm also in the process with the school to see if I am able to take between now and when I go out to the specialist off with FMLA leave and my short term disability if I am would your office be willing to handle any paperwork I might need done. Since this started I've only been able to work a handful of days.

## 2021-09-14 NOTE — TELEPHONE ENCOUNTER
"Please advise on pain rx,?   was put on Ultram 50 mg two tablets bid, (KARAN [place] PA-C -neuro)    I see percocet from KARAN [place] PA-C note  6-8-21  \"Trileptal 600 mg 3 times daily    She continues also to use episodic use of Percocet 10/325, however, does this on a limited basis without any significant benefit. She also uses baclofen 20 mg 3 times daily and does not believe this provides any benefit. In the past, she also has had a prescription for buspirone 10 mg 2 times daily, however also does not take this medication.\"    Per Joon last refills   8-13-21 lyrica  7-6-21 ultram 50 mg #120      Please advise what pain medication, as our office has not written any        "

## 2021-09-15 ENCOUNTER — HOSPITAL ENCOUNTER (OUTPATIENT)
Dept: SLEEP MEDICINE | Facility: HOSPITAL | Age: 31
Discharge: HOME | End: 2021-09-15

## 2021-11-23 ENCOUNTER — OFFICE VISIT (OUTPATIENT)
Dept: FAMILY MEDICINE CLINIC | Facility: CLINIC | Age: 31
End: 2021-11-23

## 2021-11-23 VITALS
BODY MASS INDEX: 45.24 KG/M2 | TEMPERATURE: 97.3 F | HEIGHT: 64 IN | DIASTOLIC BLOOD PRESSURE: 68 MMHG | HEART RATE: 94 BPM | RESPIRATION RATE: 16 BRPM | WEIGHT: 265 LBS | SYSTOLIC BLOOD PRESSURE: 118 MMHG | OXYGEN SATURATION: 98 %

## 2021-11-23 DIAGNOSIS — M25.562 ACUTE PAIN OF BOTH KNEES: Primary | ICD-10-CM

## 2021-11-23 DIAGNOSIS — J01.90 ACUTE NON-RECURRENT SINUSITIS, UNSPECIFIED LOCATION: ICD-10-CM

## 2021-11-23 DIAGNOSIS — M25.561 ACUTE PAIN OF BOTH KNEES: Primary | ICD-10-CM

## 2021-11-23 PROCEDURE — 99213 OFFICE O/P EST LOW 20 MIN: CPT | Performed by: NURSE PRACTITIONER

## 2021-11-23 RX ORDER — PREDNISONE 20 MG/1
20 TABLET ORAL DAILY
Qty: 5 TABLET | Refills: 0 | Status: SHIPPED | OUTPATIENT
Start: 2021-11-23 | End: 2021-11-28

## 2021-11-23 RX ORDER — TOPIRAMATE 25 MG/1
25 TABLET ORAL 2 TIMES DAILY
COMMUNITY
Start: 2021-11-05 | End: 2021-12-27 | Stop reason: SDUPTHER

## 2021-11-23 RX ORDER — CELECOXIB 100 MG/1
100 CAPSULE ORAL DAILY
Qty: 30 CAPSULE | Refills: 1 | Status: SHIPPED | OUTPATIENT
Start: 2021-11-23 | End: 2021-12-27

## 2021-11-23 RX ORDER — AMOXICILLIN AND CLAVULANATE POTASSIUM 875; 125 MG/1; MG/1
1 TABLET, FILM COATED ORAL 2 TIMES DAILY
Qty: 20 TABLET | Refills: 0 | Status: SHIPPED | OUTPATIENT
Start: 2021-11-23 | End: 2021-12-03

## 2021-11-23 NOTE — PATIENT INSTRUCTIONS
Obesity, Adult  Obesity is the condition of having too much total body fat. Being overweight or obese means that your weight is greater than what is considered healthy for your body size. Obesity is determined by a measurement called BMI. BMI is an estimate of body fat and is calculated from height and weight. For adults, a BMI of 30 or higher is considered obese.  Obesity can lead to other health concerns and major illnesses, including:  · Stroke.  · Coronary artery disease (CAD).  · Type 2 diabetes.  · Some types of cancer, including cancers of the colon, breast, uterus, and gallbladder.  · Osteoarthritis.  · High blood pressure (hypertension).  · High cholesterol.  · Sleep apnea.  · Gallbladder stones.  · Infertility problems.  What are the causes?  Common causes of this condition include:  · Eating daily meals that are high in calories, sugar, and fat.  · Being born with genes that may make you more likely to become obese.  · Having a medical condition that causes obesity, including:  ? Hypothyroidism.  ? Polycystic ovarian syndrome (PCOS).  ? Binge-eating disorder.  ? Cushing syndrome.  · Taking certain medicines, such as steroids, antidepressants, and seizure medicines.  · Not being physically active (sedentary lifestyle).  · Not getting enough sleep.  · Drinking high amounts of sugar-sweetened beverages, such as soft drinks.  What increases the risk?  The following factors may make you more likely to develop this condition:  · Having a family history of obesity.  · Being a woman of  descent.  · Being a man of  descent.  · Living in an area with limited access to:  ? [place], recreation centers, or sidewalks.  ? Healthy food choices, such as grocery stores and farmers' markets.  What are the signs or symptoms?  The main sign of this condition is having too much body fat.  How is this diagnosed?  This condition is diagnosed based on:  · Your BMI. If you are an adult with a BMI of 30 or  higher, you are considered obese.  · Your waist circumference. This measures the distance around your waistline.  · Your skinfold thickness. Your health care provider may gently pinch a fold of your skin and measure it.  You may have other tests to check for underlying conditions.  How is this treated?  Treatment for this condition often includes changing your lifestyle. Treatment may include some or all of the following:  · Dietary changes. This may include developing a healthy meal plan.  · Regular physical activity. This may include activity that causes your heart to beat faster (aerobic exercise) and strength training. Work with your health care provider to design an exercise program that works for you.  · Medicine to help you lose weight if you are unable to lose 1 pound a week after 6 weeks of healthy eating and more physical activity.  · Treating conditions that cause the obesity (underlying conditions).  · Surgery. Surgical options may include gastric banding and gastric bypass. Surgery may be done if:  ? Other treatments have not helped to improve your condition.  ? You have a BMI of 40 or higher.  ? You have life-threatening health problems related to obesity.  Follow these instructions at home:  Eating and drinking    · Follow recommendations from your health care provider about what you eat and drink. Your health care provider may advise you to:  ? Limit fast food, sweets, and processed snack foods.  ? Choose low-fat options, such as low-fat milk instead of whole milk.  ? Eat 5 or more servings of fruits or vegetables every day.  ? Eat at home more often. This gives you more control over what you eat.  ? Choose healthy foods when you eat out.  ? Learn to read food labels. This will help you understand how much food is considered 1 serving.  ? Learn what a healthy serving size is.  ? Keep low-fat snacks available.  ? Limit sugary drinks, such as soda, fruit juice, sweetened iced tea, and flavored  milk.  · Drink enough water to keep your urine pale yellow.  · Do not follow a fad diet. Fad diets can be unhealthy and even dangerous.    Physical activity  · Exercise regularly, as told by your health care provider.  ? Most adults should get up to 150 minutes of moderate-intensity exercise every week.  ? Ask your health care provider what types of exercise are safe for you and how often you should exercise.  · Warm up and stretch before being active.  · Cool down and stretch after being active.  · Rest between periods of activity.  Lifestyle  · Work with your health care provider and a dietitian to set a weight-loss goal that is healthy and reasonable for you.  · Limit your screen time.  · Find ways to reward yourself that do not involve food.  · Do not drink alcohol if:  ? Your health care provider tells you not to drink.  ? You are pregnant, may be pregnant, or are planning to become pregnant.  · If you drink alcohol:  ? Limit how much you use to:  § 0-1 drink a day for women.  § 0-2 drinks a day for men.  ? Be aware of how much alcohol is in your drink. In the [place] one drink equals one 12 oz bottle of beer (355 mL), one 5 oz glass of wine (148 mL), or one 1½ oz glass of hard liquor (44 mL).  General instructions  · Keep a weight-loss journal to keep track of the food you eat and how much exercise you get.  · Take over-the-counter and prescription medicines only as told by your health care provider.  · Take vitamins and supplements only as told by your health care provider.  · Consider joining a support group. Your health care provider may be able to recommend a support group.  · Keep all follow-up visits as told by your health care provider. This is important.  Contact a health care provider if:  · You are unable to meet your weight loss goal after 6 weeks of dietary and lifestyle changes.  Get help right away if you are having:  · Trouble breathing.  · Suicidal thoughts or behaviors.  Summary  · Obesity is  the condition of having too much total body fat.  · Being overweight or obese means that your weight is greater than what is considered healthy for your body size.  · Work with your health care provider and a dietitian to set a weight-loss goal that is healthy and reasonable for you.  · Exercise regularly, as told by your health care provider. Ask your health care provider what types of exercise are safe for you and how often you should exercise.  This information is not intended to replace advice given to you by your health care provider. Make sure you discuss any questions you have with your health care provider.  Document Revised: 08/22/2019 Document Reviewed: 08/22/2019  Elsevier Patient Education © 2021 [employer]

## 2021-11-23 NOTE — PROGRESS NOTES
Subjective   Chief Complaint:  Sinus pain and pressure, nasal congestion    History of Present Illness:  This 30 y.o. female was seen in the office today.  Reports sinus pain and pressure with nasal congestion and having to blow nose often with thick drainage x5 days.  She is tested negative for Covid within the last 5 days after symptom onset.  She reports her daughter had RSV recently as well.  She also reports an acute flareup of both knees and wanting to discuss this at a later time.    Allergies   Allergen Reactions   • Ibuprofen      MAKES THROAT BURN      Current Outpatient Medications on File Prior to Visit   Medication Sig   • celecoxib (CeleBREX) 100 MG capsule Take 1 capsule by mouth Daily.   • LO LOESTRIN FE 1 MG-10 MCG / 10 MCG tablet TAKE 1 TABLET BY MOUTH ONCE DAILY   • Lyrica 200 MG capsule Take 1 capsule by mouth 3 (Three) Times a Day.   • OXcarbazepine (Trileptal) 600 MG tablet Take 1 tablet by mouth 3 (Three) Times a Day.   • topiramate (TOPAMAX) 25 MG tablet Take 25 mg by mouth 2 (Two) Times a Day.   • [DISCONTINUED] nortriptyline (Pamelor) 75 MG capsule Take 1 capsule by mouth 2 (Two) Times a Day.     No current facility-administered medications on file prior to visit.      Past Medical, Surgical, Social, and Family History:  Past Medical History:   Diagnosis Date   • Contraception    • Dyspareunia    • Hypertension    • Insulin resistance    • Intractable migraine without aura and without status migrainosus 2021   • Intractable migraine without aura and without status migrainosus 2021   • IUD check up    • Ovarian cyst    • PCOS (polycystic ovarian syndrome)    • Trigeminal neuralgia    • Vitamin D deficiency      Past Surgical History:   Procedure Laterality Date   •  SECTION N/A 1/10/2018    Procedure:  SECTION PRIMARY;  Surgeon: Soham Osorio MD;  Location: [facility] LABOR DELIVERY;  Service:    • TONSILLECTOMY AND ADENOIDECTOMY      hemorrhaged with surgical repair  "3 days after surgery   • WISDOM TOOTH EXTRACTION       Social History     Socioeconomic History   • Marital status:    Tobacco Use   • Smoking status: Never Smoker   • Smokeless tobacco: Never Used   Vaping Use   • Vaping Use: Never used   Substance and Sexual Activity   • Alcohol use: Yes     Comment: occasional   • Drug use: No   • Sexual activity: Yes     Partners: Male     Birth control/protection: None, Pill     Family History   Problem Relation Age of Onset   • No Known Problems Mother    • No Known Problems Father    • No Known Problems Maternal Grandmother    • No Known Problems Maternal Grandfather    • Leukemia Paternal Grandmother    • No Known Problems Paternal Grandfather      Objective   Physical Exam  Vitals reviewed.   Constitutional:       General: She is not in acute distress.     Appearance: Normal appearance. She is obese.   HENT:      Nose:      Right Sinus: Maxillary sinus tenderness and frontal sinus tenderness present.      Left Sinus: Maxillary sinus tenderness and frontal sinus tenderness present.      Mouth/Throat:      Pharynx: Oropharyngeal exudate and posterior oropharyngeal erythema present.   Cardiovascular:      Rate and Rhythm: Normal rate and regular rhythm.   Pulmonary:      Effort: Pulmonary effort is normal.      Breath sounds: Normal breath sounds.   Musculoskeletal:      Right knee: No swelling. Tenderness present.      Left knee: No swelling. Tenderness present.     /68   Pulse 94   Temp 97.3 °F (36.3 °C)   Resp 16   Ht 162.6 cm (64\")   Wt 120 kg (265 lb)   SpO2 98%   BMI 45.49 kg/m²     Assessment/Plan   Diagnoses and all orders for this visit:    1. Acute pain of both knees (Primary)  -     XR Knee 3 View Right; Future  -     XR Knee 3 View Left; Future    2. Acute non-recurrent sinusitis, unspecified location    Other orders  -     predniSONE (DELTASONE) 20 MG tablet; Take 1 tablet by mouth Daily for 5 days.  Dispense: 5 tablet; Refill: 0  -     " amoxicillin-clavulanate (Augmentin) 875-125 MG per tablet; Take 1 tablet by mouth 2 (Two) Times a Day for 10 days.  Dispense: 20 tablet; Refill: 0    Discussion:  Advised and educated plan of care.  We discussed differentials with the respiratory issues, she is not showing signs of RSV, this is more acute sinusitis symptoms.    We discussed her problem with her knees, really we need baseline x-rays to look at the joint spaces first, the question here is is there an underlying rheumatic condition, with this giving steroids and suppressants today, will forego any kind of testing as she does not feel well.  She reports she wants to get this worked up but may be in a few weeks possibly during [place] break.  She is willing to get the baseline x-rays to start this process and see us back.  Advised weight loss also-teaching sheet given which ultimately would help her knees as well.    Patient's Body mass index is 45.49 kg/m². indicating that she is obese (BMI >30). Obesity-related health conditions include the following: osteoarthritis. Obesity is unchanged. BMI is is above average; BMI management plan is completed. We discussed portion control and increasing exercise..    Teaching sheet on weight loss given.    Follow-up:  Return in 1 month (on 12/23/2021) for 3-4 weeks after xrays.    Electronically signed by ZITA Crenshaw, 11/23/21, 9:32 AM CST.

## 2021-12-17 DIAGNOSIS — G50.0 TRIGEMINAL NEURALGIA OF RIGHT SIDE OF FACE: ICD-10-CM

## 2021-12-17 RX ORDER — OXCARBAZEPINE 600 MG/1
600 TABLET, FILM COATED ORAL 3 TIMES DAILY
Qty: 90 TABLET | Refills: 1 | Status: SHIPPED | OUTPATIENT
Start: 2021-12-17 | End: 2022-04-01

## 2021-12-23 NOTE — TELEPHONE ENCOUNTER
Pt was informed to use back up birth control due to the medication that she is currently taking is not as effective Pt states she is using back up birth control    No

## 2021-12-27 ENCOUNTER — OFFICE VISIT (OUTPATIENT)
Dept: NEUROLOGY | Facility: CLINIC | Age: 31
End: 2021-12-27

## 2021-12-27 VITALS
SYSTOLIC BLOOD PRESSURE: 130 MMHG | WEIGHT: 265 LBS | HEIGHT: 64 IN | OXYGEN SATURATION: 100 % | BODY MASS INDEX: 45.24 KG/M2 | HEART RATE: 97 BPM | RESPIRATION RATE: 17 BRPM | DIASTOLIC BLOOD PRESSURE: 82 MMHG

## 2021-12-27 DIAGNOSIS — R51.9 RIGHT FACIAL PAIN: ICD-10-CM

## 2021-12-27 DIAGNOSIS — G50.0 TRIGEMINAL NEURALGIA OF RIGHT SIDE OF FACE: Primary | ICD-10-CM

## 2021-12-27 PROCEDURE — 99213 OFFICE O/P EST LOW 20 MIN: CPT | Performed by: NURSE PRACTITIONER

## 2021-12-27 RX ORDER — TOPIRAMATE 50 MG/1
50 TABLET, FILM COATED ORAL 2 TIMES DAILY
Qty: 30 TABLET | Refills: 3 | Status: SHIPPED | OUTPATIENT
Start: 2021-12-27 | End: 2022-01-04 | Stop reason: SDUPTHER

## 2021-12-27 NOTE — PROGRESS NOTES
Neurology Progress Note      Chief Complaint:    Trigeminal Neuralgia    Subjective     Subjective:  Jaquelin Rivera is a 31 y.o. female who presents today for trigeminal neuralgia.  She is routinely followed by Dr. Braulio MD for primary care.  She was last referred to  facial pain clinic for evaluation.  They started her on Topamax 25mg twice daily and advised to have Botox done in [place] as it is closer to her home.  She notes that after the addition of Topamax, she did note improvement of her symptoms.  However they have started to slowly return.  She complains of shooting pains in all three branches of the trigeminal nerve.  She notes this is progressively gotten worse over time. She notes that since the addition of Topamax, she has noted an overall decrease in pain but she does have flairs at times which are more severe.  She continues to take trileptal 600mg 3 times daily and lyrica 200mg three times daily for treatment as well.  The patient is agreeable to the idea of Botox injections.  She does continue to note pain in association to TMJ as previously identified by Jose Osorio. She states she has not seen an oral/maxofacial surgeon for evaluation.  She continues with clicking in her jaw.  This does also exacerbate her symptoms of her trigeminal neuralgia as well.      Past Medical History:   Diagnosis Date   • Contraception    • Dyspareunia    • Hypertension    • Insulin resistance    • Intractable migraine without aura and without status migrainosus 2021   • Intractable migraine without aura and without status migrainosus 2021   • IUD check up    • Ovarian cyst    • PCOS (polycystic ovarian syndrome)    • Trigeminal neuralgia    • Vitamin D deficiency      Past Surgical History:   Procedure Laterality Date   •  SECTION N/A 1/10/2018    Procedure:  SECTION PRIMARY;  Surgeon: Soham Osorio MD;  Location: [facility] DELIVERY;  Service:    • TONSILLECTOMY AND  ADENOIDECTOMY      hemorrhaged with surgical repair 3 days after surgery   • WISDOM TOOTH EXTRACTION       Family History   Problem Relation Age of Onset   • No Known Problems Mother    • No Known Problems Father    • No Known Problems Maternal Grandmother    • No Known Problems Maternal Grandfather    • Leukemia Paternal Grandmother    • No Known Problems Paternal Grandfather      Social History     Tobacco Use   • Smoking status: Never Smoker   • Smokeless tobacco: Never Used   Vaping Use   • Vaping Use: Never used   Substance Use Topics   • Alcohol use: Yes     Comment: occasional   • Drug use: No     Medications:  Current Outpatient Medications   Medication Sig Dispense Refill   • LO LOESTRIN FE 1 MG-10 MCG / 10 MCG tablet TAKE 1 TABLET BY MOUTH ONCE DAILY 28 tablet 6   • OXcarbazepine (Trileptal) 600 MG tablet Take 1 tablet by mouth 3 (Three) Times a Day. 90 tablet 1   • topiramate (TOPAMAX) 50 MG tablet Take 1 tablet by mouth 2 (Two) Times a Day. 30 tablet 3   • Lyrica 200 MG capsule Take 1 capsule by mouth 3 (Three) Times a Day. 90 capsule 2     No current facility-administered medications for this visit.     Current outpatient and discharge medications have been reconciled for the patient.  Reviewed by: ZITA Connolly      Allergies:    Ibuprofen    Review of Systems:   Review of Systems   HENT: Negative for facial swelling.         Right Sided facial pain.    Musculoskeletal: Negative for gait problem.   Neurological: Negative for facial asymmetry, speech difficulty, weakness and numbness.     Objective      Vital Signs  Heart Rate:  [97] 97  Resp:  [17] 17  BP: (130)/(82) 130/82    Physical Exam:  Physical Exam  Constitutional:       Appearance: Normal appearance.   HENT:      Head: Normocephalic.      Jaw: Tenderness and pain on movement present.   Eyes:      Extraocular Movements: Extraocular movements intact.      Pupils: Pupils are equal, round, and reactive to light.   Cardiovascular:      Rate  and Rhythm: Normal rate and regular rhythm.      Pulses: Normal pulses.   Pulmonary:      Effort: Pulmonary effort is normal.   Musculoskeletal:         General: Normal range of motion.      Cervical back: Normal range of motion and neck supple.   Skin:     General: Skin is warm and dry.      Capillary Refill: Capillary refill takes less than 2 seconds.   Neurological:      Gait: Gait is intact.   Psychiatric:         Mood and Affect: Mood normal.       Neurologic Exam:  Mental Status:    -Awake. Alert. Oriented to person, place & time.  -No word finding difficulties.  -No aphasia.  -No dysarthria.  -Follows simple commands.     CN II:  Full visual fields with confrontation.  Pupils equally reactive to light.  CN III, IV, VI:  Extraocular muscles function intact with no nystagmus.  CN V:  Facial sensory is symmetric. Noted right sided facial pain tender to touch and movement of the mouth. Pain 2/10 today.  Located in all three branches of the trigeminal nerve.   CN VII:  Facial motor symmetric.  CN VIII:  Gross hearing intact bilaterally.  CN IX/X:  Palate elevates symmetrically.  CN XI:  Shoulder shrug symmetric.  CN XII:  Tongue is midline on protrusion.     Motor: (strength out of 5:  1= minimal movement, 2 = movement in plane of gravity, 3 = movement against gravity, 4 = movement against some resistance, 5 = full strength)     -5/5 in bilateral biceps, triceps, brachioradialis, wrist extensors and intrinsic muscles of the hand.    -5/5 in bilateral hip flexors, quadriceps, hamstrings, gastrocsoleus complex, anterior tibialis and extensor hallucis longus.       Deep Tendon Reflexes:  -Right              Biceps: 2+         Triceps: 2+      Brachioradialis: 2+              Patella: 2+       Ankle: 2+         Babinski:  negative  -Left              Biceps: 2+         Triceps: 2+      Brachioradialis: 2+              Patella: 2+       Ankle: 2+         Babinski:  negative    Tone (Modified Ara Scale):  No  appreciable increase in tone or rigidity noted.     Sensory:  -Intact to light touch, pinprick BUE (C5-T1) and BLE (L2-S1).     Coordination:  -Finger to nose intact BUEs  -Heel to shin intact BLEs  -No ataxia     Gait  -No signs of ataxia  -ambulates unassisted    Assessment/Plan     Impression:  • Right-Sided Trigeminal Neuralgia  • TMJ Dysfunction    Plan:  • Continue Trileptal 600mg 3 times daily and lyrica 200mg three times daily.     • Increase Topamax to 50mg twice daily.  The patient has seen a favorable response to the addition of this and I would like to optimize this medication.     • We will evaluate the response to the Topamax changes prior to Botox referral.  I have spoken with Dr. Karla Romero MD regarding this manner and she is agreeable to see the patient if this is not beneficial to evaluate her for botox administration.     • Referral to maxillofacial surgery for evaluation of TMJ dysfunction.  She has been referred in the past and had not been seen.     • She is to notify me of any changes in the interm.      The patient and I have discussed the plan of care and she is in full agreement at this time.     Follow-Up:  • Return in about 3 months (around 3/27/2022) for Trigeminal Neuralgia.      Sourav Arguelles, ZITA  12/27/21  16:42 CST

## 2021-12-28 ENCOUNTER — TELEPHONE (OUTPATIENT)
Dept: NEUROLOGY | Facility: CLINIC | Age: 31
End: 2021-12-28

## 2021-12-28 NOTE — TELEPHONE ENCOUNTER
Spoke with Jaquelin about the increase on her Topomax. Pt understood. She asked about refills on the Lyrica. I will let her know. LS

## 2021-12-29 DIAGNOSIS — G50.0 TRIGEMINAL NEURALGIA OF RIGHT SIDE OF FACE: ICD-10-CM

## 2022-01-04 ENCOUNTER — PATIENT MESSAGE (OUTPATIENT)
Dept: NEUROLOGY | Facility: CLINIC | Age: 32
End: 2022-01-04

## 2022-01-04 RX ORDER — TOPIRAMATE 50 MG/1
50 TABLET, FILM COATED ORAL 2 TIMES DAILY
Qty: 60 TABLET | Refills: 3 | Status: SHIPPED | OUTPATIENT
Start: 2022-01-04 | End: 2022-03-07 | Stop reason: SDUPTHER

## 2022-01-18 ENCOUNTER — HOSPITAL ENCOUNTER (OUTPATIENT)
Dept: GENERAL RADIOLOGY | Facility: HOSPITAL | Age: 32
Discharge: HOME | End: 2022-01-18
Admitting: NURSE PRACTITIONER

## 2022-01-18 DIAGNOSIS — M25.561 ACUTE PAIN OF BOTH KNEES: ICD-10-CM

## 2022-01-18 DIAGNOSIS — G50.0 TRIGEMINAL NEURALGIA OF RIGHT SIDE OF FACE: Primary | ICD-10-CM

## 2022-01-18 DIAGNOSIS — M25.562 ACUTE PAIN OF BOTH KNEES: ICD-10-CM

## 2022-01-18 DIAGNOSIS — R51.9 RIGHT FACIAL PAIN: ICD-10-CM

## 2022-01-18 PROCEDURE — 73562 X-RAY EXAM OF KNEE 3: CPT

## 2022-01-18 RX ORDER — CELECOXIB 100 MG/1
100 CAPSULE ORAL DAILY
Qty: 30 CAPSULE | Refills: 5 | Status: SHIPPED | OUTPATIENT
Start: 2022-01-18 | End: 2022-09-07

## 2022-01-19 NOTE — PROGRESS NOTES
Please call the patient - Negative xrays of knees.  These were ordered some time ago.  If knee pain has been this persistent, I would advise self-refer to ortho institute if needed.      Electronically signed by ZITA Crenshaw, 01/19/22, 8:45 AM CST.

## 2022-01-20 ENCOUNTER — OFFICE VISIT (OUTPATIENT)
Dept: FAMILY MEDICINE CLINIC | Facility: CLINIC | Age: 32
End: 2022-01-20

## 2022-01-20 VITALS
BODY MASS INDEX: 45.24 KG/M2 | HEIGHT: 64 IN | HEART RATE: 96 BPM | TEMPERATURE: 98.3 F | RESPIRATION RATE: 16 BRPM | WEIGHT: 265 LBS | OXYGEN SATURATION: 98 %

## 2022-01-20 DIAGNOSIS — M25.561 CHRONIC PAIN OF BOTH KNEES: Primary | ICD-10-CM

## 2022-01-20 DIAGNOSIS — M25.562 CHRONIC PAIN OF BOTH KNEES: Primary | ICD-10-CM

## 2022-01-20 DIAGNOSIS — G89.29 CHRONIC PAIN OF BOTH KNEES: Primary | ICD-10-CM

## 2022-01-20 PROCEDURE — 99213 OFFICE O/P EST LOW 20 MIN: CPT | Performed by: NURSE PRACTITIONER

## 2022-01-20 NOTE — PROGRESS NOTES
Subjective   Chief Complaint:  Knee pain    History of Present Illness:  This 31 y.o. female was seen in the office today.  She presents back today with bilateral knee pain.  X-rays were ordered in November, she got these done recently which did not show any degenerative changes.  She reports this has become a chronic issue for several months.  She is already on oral Ku 2 inhibitors.    Allergies   Allergen Reactions   • Ibuprofen      MAKES THROAT BURN      Current Outpatient Medications on File Prior to Visit   Medication Sig   • celecoxib (CeleBREX) 100 MG capsule Take 1 capsule by mouth Daily.   • LO LOESTRIN FE 1 MG-10 MCG / 10 MCG tablet TAKE 1 TABLET BY MOUTH ONCE DAILY   • Lyrica 200 MG capsule Take 1 capsule by mouth 3 (Three) Times a Day.   • OXcarbazepine (Trileptal) 600 MG tablet Take 1 tablet by mouth 3 (Three) Times a Day.   • topiramate (TOPAMAX) 50 MG tablet Take 1 tablet by mouth 2 (Two) Times a Day.     No current facility-administered medications on file prior to visit.      Past Medical, Surgical, Social, and Family History:  Past Medical History:   Diagnosis Date   • Contraception    • Dyspareunia    • Hypertension    • Insulin resistance    • Intractable migraine without aura and without status migrainosus 2021   • Intractable migraine without aura and without status migrainosus 2021   • IUD check up    • Ovarian cyst    • PCOS (polycystic ovarian syndrome)    • Trigeminal neuralgia    • Vitamin D deficiency      Past Surgical History:   Procedure Laterality Date   •  SECTION N/A 1/10/2018    Procedure:  SECTION PRIMARY;  Surgeon: Soham Osorio MD;  Location: [facility] LABOR DELIVERY;  Service:    • TONSILLECTOMY AND ADENOIDECTOMY      hemorrhaged with surgical repair 3 days after surgery   • WISDOM TOOTH EXTRACTION       Social History     Socioeconomic History   • Marital status:    Tobacco Use   • Smoking status: Never Smoker   • Smokeless tobacco: Never  "Used   Vaping Use   • Vaping Use: Never used   Substance and Sexual Activity   • Alcohol use: Yes     Comment: occasional   • Drug use: No   • Sexual activity: Yes     Partners: Male     Birth control/protection: None, Pill     Family History   Problem Relation Age of Onset   • No Known Problems Mother    • No Known Problems Father    • No Known Problems Maternal Grandmother    • No Known Problems Maternal Grandfather    • Leukemia Paternal Grandmother    • No Known Problems Paternal Grandfather      Objective   Physical Exam  Vitals reviewed.   Constitutional:       General: She is not in acute distress.     Appearance: Normal appearance.   Cardiovascular:      Rate and Rhythm: Normal rate and regular rhythm.   Pulmonary:      Effort: Pulmonary effort is normal.      Breath sounds: Normal breath sounds.   Musculoskeletal:         General: Tenderness present. No swelling.     Pulse 96   Temp 98.3 °F (36.8 °C)   Resp 16   Ht 162.6 cm (64\")   Wt 120 kg (265 lb)   SpO2 98%   BMI 45.49 kg/m²     Assessment/Plan   Diagnoses and all orders for this visit:    1. Chronic pain of both knees (Primary)  -     Diclofenac Sodium (VOLTAREN) 1 % gel gel; Apply 4 g topically to the appropriate area as directed 4 (Four) Times a Day As Needed (pain).  Dispense: 100 g; Refill: 0  -     Ambulatory Referral to Orthopedic Surgery    Discussion:  Advised and educated plan of care.  Topical meds ordered-referral placed.    Follow-up:  Return if symptoms worsen or fail to improve.    Electronically signed by ZITA Crenshaw, 01/20/22, 4:16 PM CST.  "

## 2022-02-25 ENCOUNTER — OFFICE VISIT (OUTPATIENT)
Dept: FAMILY MEDICINE CLINIC | Facility: CLINIC | Age: 32
End: 2022-02-25

## 2022-02-25 ENCOUNTER — TELEPHONE (OUTPATIENT)
Dept: FAMILY MEDICINE CLINIC | Facility: CLINIC | Age: 32
End: 2022-02-25

## 2022-02-25 VITALS — TEMPERATURE: 98 F | OXYGEN SATURATION: 97 % | RESPIRATION RATE: 20 BRPM | HEART RATE: 81 BPM

## 2022-02-25 DIAGNOSIS — R05.9 COUGH: Primary | ICD-10-CM

## 2022-02-25 DIAGNOSIS — Z20.822 EXPOSURE TO COVID-19 VIRUS: ICD-10-CM

## 2022-02-25 DIAGNOSIS — Z20.822 SUSPECTED COVID-19 VIRUS INFECTION: Primary | ICD-10-CM

## 2022-02-25 DIAGNOSIS — R68.89 FLU-LIKE SYMPTOMS: ICD-10-CM

## 2022-02-25 DIAGNOSIS — J98.8 CONGESTION OF UPPER AIRWAY: ICD-10-CM

## 2022-02-25 PROCEDURE — 99213 OFFICE O/P EST LOW 20 MIN: CPT | Performed by: NURSE PRACTITIONER

## 2022-02-25 NOTE — TELEPHONE ENCOUNTER
tested positive and had infusion done yesterday. Pt tried a home test that read negative yesterday when symptoms started. Today the URI symptoms have worsened. Placed order for Covid and flu test and directed her to [facility] thru for testing. Do you want to go ahead and see her this afternoon or wait til Monday when covid test is in?

## 2022-02-25 NOTE — PROGRESS NOTES
Subjective   Chief Complaint:  Evaluation of Respiratory Symptoms    History of Present Illness:  This 31 y.o. female was seen in the office today in the drive through.  Reports has been experiencing chest congestion, fever, body aches, fatigue, headache, brain fog with an acute onset starting today.    Pertinent negatives include no report of loss of taste or smell.    Patient reports recent exposure to active Covid-19 cases.  She reports  is positive and lives in the same household.        Covid-19/Influenza Vaccinations Reviewed:    Covid-19 Moderna x1  Influenza not vaccinated    Allergies   Allergen Reactions   • Ibuprofen      MAKES THROAT BURN      Current Outpatient Medications on File Prior to Visit   Medication Sig   • celecoxib (CeleBREX) 100 MG capsule Take 1 capsule by mouth Daily.   • Diclofenac Sodium (VOLTAREN) 1 % gel gel Apply 4 g topically to the appropriate area as directed 4 (Four) Times a Day As Needed (pain).   • LO LOESTRIN FE 1 MG-10 MCG / 10 MCG tablet TAKE 1 TABLET BY MOUTH ONCE DAILY   • Lyrica 200 MG capsule Take 1 capsule by mouth 3 (Three) Times a Day.   • OXcarbazepine (Trileptal) 600 MG tablet Take 1 tablet by mouth 3 (Three) Times a Day.   • topiramate (TOPAMAX) 50 MG tablet Take 1 tablet by mouth 2 (Two) Times a Day.     No current facility-administered medications on file prior to visit.      Past Medical, Surgical, Social, and Family History:  Past Medical History:   Diagnosis Date   • Contraception    • Dyspareunia    • Hypertension    • Insulin resistance    • Intractable migraine without aura and without status migrainosus 2021   • Intractable migraine without aura and without status migrainosus 2021   • IUD check up    • Ovarian cyst    • PCOS (polycystic ovarian syndrome)    • Trigeminal neuralgia    • Vitamin D deficiency      Past Surgical History:   Procedure Laterality Date   •  SECTION N/A 1/10/2018    Procedure:  SECTION PRIMARY;   Surgeon: Soham Osorio MD;  Location: [facility] LABOR DELIVERY;  Service:    • TONSILLECTOMY AND ADENOIDECTOMY      hemorrhaged with surgical repair 3 days after surgery   • WISDOM TOOTH EXTRACTION       Social History     Socioeconomic History   • Marital status:    Tobacco Use   • Smoking status: Never Smoker   • Smokeless tobacco: Never Used   Vaping Use   • Vaping Use: Never used   Substance and Sexual Activity   • Alcohol use: Yes     Comment: occasional   • Drug use: No   • Sexual activity: Yes     Partners: Male     Birth control/protection: None, Pill     Family History   Problem Relation Age of Onset   • No Known Problems Mother    • No Known Problems Father    • No Known Problems Maternal Grandmother    • No Known Problems Maternal Grandfather    • Leukemia Paternal Grandmother    • No Known Problems Paternal Grandfather      Objective   Covid Precautions Maintained  Physical Exam  Constitutional:       General: She is not in acute distress.     Appearance: She is obese. She is ill-appearing.   Pulmonary:      Effort: Pulmonary effort is normal. No respiratory distress.   Neurological:      Mental Status: She is alert.     There were no vitals taken for this visit.    Assessment/Plan   Diagnoses and all orders for this visit:    1. Suspected COVID-19 virus infection (Primary)    Discussion:  Advised and educated plan of care.  Covid test today, we discussed vitamin C, D, zinc, quarantine.    Follow-up:  No follow-ups on file.    Electronically signed by ZITA Crenshaw, 02/25/22, 2:36 PM CST.

## 2022-02-25 NOTE — TELEPHONE ENCOUNTER
Chronic and ongoing. August 2020 she had a mammo and ultrasounds that showed a lump in her breast. She was told that it looked like a cyst and that she should have another mammo in 6 months. She has not had that mammo yet.   See if can do a back door visit today    Electronically signed by ZITA Crenshaw, 02/25/22, 12:09 PM CST.

## 2022-02-25 NOTE — TELEPHONE ENCOUNTER
Caller: Jaquelin Rivera    Relationship to patient: Self    Best call back number: [phone]    COVID19 symptoms: BODY ACHES, HEADACHES, FEVER    Additional information or concerns: PATIENT STATES HER  RECENTLY TESTED POSITIVE FOR COVID AND SHE IS NOW EXPERIENCING BODY ACHES, FEVER, AND HEADACHES. SHE ALSO TESTED NEGATIVE TODAY 2/25 WITH AN AT HOME TEST. SHE WOULD LIKE A CALLBACK WITH ADVICE ON RECEIVING ANOTHER COVID TEST/NEXT STEPS OF CARE.

## 2022-02-28 ENCOUNTER — TELEPHONE (OUTPATIENT)
Dept: FAMILY MEDICINE CLINIC | Facility: CLINIC | Age: 32
End: 2022-02-28

## 2022-02-28 NOTE — TELEPHONE ENCOUNTER
Caller: Jaquelin Rivera    Relationship: Self    Best call back number: [phone]    What is the best time to reach you: ANY     Who are you requesting to speak with (clinical staff, provider,  specific staff member): CLINICAL, DR. CADENA'S NURSE     What was the call regarding: PATIENT STATED SHE WAS IN CONTACT WITH DR. CADENA'S NURSE THIS MORNING TO CONSULT ABOUT ONGOING COVID SYMPTOMS DESPITE NEGATIVE TEST, SHE WOULD LIKE A CALL BACK AS TO WHETHER SHE NEEDS TO BE OFF OF WORK AGAIN TOMORROW AS SHE WILL NEED TO CALL IN A SUBSTITUTE IF SO.     Do you require a callback: YES PLEASE

## 2022-03-01 ENCOUNTER — OFFICE VISIT (OUTPATIENT)
Dept: FAMILY MEDICINE CLINIC | Facility: CLINIC | Age: 32
End: 2022-03-01

## 2022-03-01 VITALS
RESPIRATION RATE: 20 BRPM | OXYGEN SATURATION: 98 % | HEART RATE: 103 BPM | TEMPERATURE: 97.7 F | HEIGHT: 64 IN | BODY MASS INDEX: 45.07 KG/M2 | WEIGHT: 264 LBS

## 2022-03-01 DIAGNOSIS — J01.90 ACUTE NON-RECURRENT SINUSITIS, UNSPECIFIED LOCATION: Primary | ICD-10-CM

## 2022-03-01 DIAGNOSIS — Z20.822 EXPOSURE TO COVID-19 VIRUS: ICD-10-CM

## 2022-03-01 PROCEDURE — 99213 OFFICE O/P EST LOW 20 MIN: CPT | Performed by: NURSE PRACTITIONER

## 2022-03-01 RX ORDER — AMOXICILLIN AND CLAVULANATE POTASSIUM 875; 125 MG/1; MG/1
1 TABLET, FILM COATED ORAL 2 TIMES DAILY
Qty: 20 TABLET | Refills: 0 | Status: SHIPPED | OUTPATIENT
Start: 2022-03-01 | End: 2022-03-07

## 2022-03-01 RX ORDER — METHYLPREDNISOLONE 4 MG/1
TABLET ORAL
Qty: 1 EACH | Refills: 0 | Status: SHIPPED | OUTPATIENT
Start: 2022-03-01 | End: 2022-03-07

## 2022-03-01 NOTE — PROGRESS NOTES
Subjective   Chief Complaint:  Evaluation of Respiratory Symptoms    History of Present Illness:  This 31 y.o. female was seen in the office today in the drive through.  Reports has been experiencing nasal congestion, earache, sinus pain and pressure, fever, body aches with an acute onset starting last Thursday l.    Pertinent negatives include no report of loss of taste or smell.    Patient reports recent exposure to active Covid-19 cases.  Reports  is positive for Covid.    Reports tested negative for Covid last week but still having same symptoms but having increased green discharge at this point.        Allergies   Allergen Reactions   • Ibuprofen      MAKES THROAT BURN      Current Outpatient Medications on File Prior to Visit   Medication Sig   • celecoxib (CeleBREX) 100 MG capsule Take 1 capsule by mouth Daily.   • Diclofenac Sodium (VOLTAREN) 1 % gel gel Apply 4 g topically to the appropriate area as directed 4 (Four) Times a Day As Needed (pain).   • LO LOESTRIN FE 1 MG-10 MCG / 10 MCG tablet TAKE 1 TABLET BY MOUTH ONCE DAILY   • Lyrica 200 MG capsule Take 1 capsule by mouth 3 (Three) Times a Day.   • OXcarbazepine (Trileptal) 600 MG tablet Take 1 tablet by mouth 3 (Three) Times a Day.   • topiramate (TOPAMAX) 50 MG tablet Take 1 tablet by mouth 2 (Two) Times a Day.     No current facility-administered medications on file prior to visit.      Past Medical, Surgical, Social, and Family History:  Past Medical History:   Diagnosis Date   • Contraception    • Dyspareunia    • Hypertension    • Insulin resistance    • Intractable migraine without aura and without status migrainosus 2021   • Intractable migraine without aura and without status migrainosus 2021   • IUD check up    • Ovarian cyst    • PCOS (polycystic ovarian syndrome)    • Trigeminal neuralgia    • Vitamin D deficiency      Past Surgical History:   Procedure Laterality Date   •  SECTION N/A 1/10/2018    Procedure:   "SECTION PRIMARY;  Surgeon: Soham Osorio MD;  Location: [facility] LABOR DELIVERY;  Service:    • TONSILLECTOMY AND ADENOIDECTOMY      hemorrhaged with surgical repair 3 days after surgery   • WISDOM TOOTH EXTRACTION       Social History     Socioeconomic History   • Marital status:    Tobacco Use   • Smoking status: Never Smoker   • Smokeless tobacco: Never Used   Vaping Use   • Vaping Use: Never used   Substance and Sexual Activity   • Alcohol use: Yes     Comment: occasional   • Drug use: No   • Sexual activity: Yes     Partners: Male     Birth control/protection: None, Pill     Family History   Problem Relation Age of Onset   • No Known Problems Mother    • No Known Problems Father    • No Known Problems Maternal Grandmother    • No Known Problems Maternal Grandfather    • Leukemia Paternal Grandmother    • No Known Problems Paternal Grandfather      Objective   Covid Precautions Maintained  Physical Exam  Constitutional:       General: She is not in acute distress.     Appearance: She is ill-appearing.   Pulmonary:      Effort: Pulmonary effort is normal. No respiratory distress.   Neurological:      Mental Status: She is alert.     Pulse 103   Temp 97.7 °F (36.5 °C)   Resp 20   Ht 162.6 cm (64\")   Wt 120 kg (264 lb)   SpO2 98%   BMI 45.32 kg/m²     Assessment/Plan   Diagnoses and all orders for this visit:    1. Acute non-recurrent sinusitis, unspecified location (Primary)    2. Exposure to COVID-19 virus    Other orders  -     amoxicillin-clavulanate (Augmentin) 875-125 MG per tablet; Take 1 tablet by mouth 2 (Two) Times a Day for 10 days.  Dispense: 20 tablet; Refill: 0  -     methylPREDNISolone (MEDROL) 4 MG dose pack; Take as directed on package instructions.  Dispense: 1 each; Refill: 0    Discussion:  Advised and educated plan of care.  We discussed retesting versus not, by the time the test comes back, she will be passed the quarantine if it was Covid.  Antibiotics to follow for classic " sinusitis symptoms.-work note provided.    Follow-up:  Return if symptoms worsen or fail to improve.    Electronically signed by ZITA Crenshaw, 03/01/22, 1:16 PM CST.

## 2022-03-07 ENCOUNTER — OFFICE VISIT (OUTPATIENT)
Dept: NEUROLOGY | Facility: CLINIC | Age: 32
End: 2022-03-07

## 2022-03-07 VITALS
OXYGEN SATURATION: 98 % | BODY MASS INDEX: 45.07 KG/M2 | WEIGHT: 264 LBS | HEIGHT: 64 IN | DIASTOLIC BLOOD PRESSURE: 78 MMHG | SYSTOLIC BLOOD PRESSURE: 128 MMHG | RESPIRATION RATE: 19 BRPM | HEART RATE: 117 BPM

## 2022-03-07 DIAGNOSIS — G50.0 TRIGEMINAL NEURALGIA OF RIGHT SIDE OF FACE: Primary | ICD-10-CM

## 2022-03-07 DIAGNOSIS — R51.9 RIGHT FACIAL PAIN: ICD-10-CM

## 2022-03-07 PROCEDURE — 99213 OFFICE O/P EST LOW 20 MIN: CPT | Performed by: NURSE PRACTITIONER

## 2022-03-07 RX ORDER — DEXAMETHASONE SODIUM PHOSPHATE 10 MG/ML
10 INJECTION INTRAMUSCULAR; INTRAVENOUS ONCE
Status: CANCELLED
Start: 2022-03-07 | End: 2022-03-07

## 2022-03-07 RX ORDER — TOPIRAMATE 100 MG/1
100 TABLET, FILM COATED ORAL 2 TIMES DAILY
Qty: 60 TABLET | Refills: 5 | Status: SHIPPED | OUTPATIENT
Start: 2022-03-07 | End: 2022-05-24

## 2022-03-07 NOTE — PROGRESS NOTES
Neurology Progress Note      Chief Complaint:    Trigeminal Neuralgia    Subjective     Subjective:  Jaquelin Rivera is a 31 y.o. female who presents today for trigeminal neuralgia. She is routinely followed by Dr. Braulio MD for primary care.  She was last seen by me on 12/27/2021 after having been started on Topamax for her trigeminal neuralgia.  She had seen some benefit from this but felt it could have been better.  I then increased her to 50mg BID to see how she responded to this.      Today, she presents with an exacerbation of symptoms.  She reports that on Friday 3/4/2022 she felt a very sharp and shooting pain in the V2 branch of the trigeminal nerve.  Of note, she states that she has not had any pain to that degree in this branch since 2015 when she had a gamma knife procedure.  She states that she then had burning pains that then extended and has now affected all 3 branches of the trigeminal nerve.  She rates her pain at a 10/10.  She reports that she is having shooting and burning pains to the point that she cannot move her mouth from the pain. She does, however, continue to have full functionality of her face but this is just hindered by the pain she is in.  She did have an infusion back in august of 2021 due to exacerbation.  She reports that this was very helpful for her pain at that time.  She has not had an exacerbation since that time.  She reports to me that overall, she has seen improvement of her daily symptoms with the Topamax.     Past Medical History:   Diagnosis Date   • Contraception    • Dyspareunia    • Hypertension    • Insulin resistance    • Intractable migraine without aura and without status migrainosus 8/30/2021   • Intractable migraine without aura and without status migrainosus 8/30/2021   • IUD check up    • Ovarian cyst    • PCOS (polycystic ovarian syndrome)    • Trigeminal neuralgia    • Vitamin D deficiency      Past Surgical History:   Procedure Laterality Date   •   SECTION N/A 1/10/2018    Procedure:  SECTION PRIMARY;  Surgeon: Soham Osorio MD;  Location: [facility] LABOR DELIVERY;  Service:    • TONSILLECTOMY AND ADENOIDECTOMY      hemorrhaged with surgical repair 3 days after surgery   • WISDOM TOOTH EXTRACTION       Family History   Problem Relation Age of Onset   • No Known Problems Mother    • No Known Problems Father    • No Known Problems Maternal Grandmother    • No Known Problems Maternal Grandfather    • Leukemia Paternal Grandmother    • No Known Problems Paternal Grandfather      Social History     Tobacco Use   • Smoking status: Never Smoker   • Smokeless tobacco: Never Used   Vaping Use   • Vaping Use: Never used   Substance Use Topics   • Alcohol use: Yes     Comment: occasional   • Drug use: No     Medications:  Current Outpatient Medications   Medication Sig Dispense Refill   • celecoxib (CeleBREX) 100 MG capsule Take 1 capsule by mouth Daily. 30 capsule 5   • Diclofenac Sodium (VOLTAREN) 1 % gel gel Apply 4 g topically to the appropriate area as directed 4 (Four) Times a Day As Needed (pain). 100 g 0   • LO LOESTRIN FE 1 MG-10 MCG / 10 MCG tablet TAKE 1 TABLET BY MOUTH ONCE DAILY 28 tablet 6   • Lyrica 200 MG capsule Take 1 capsule by mouth 3 (Three) Times a Day. 90 capsule 2   • OXcarbazepine (Trileptal) 600 MG tablet Take 1 tablet by mouth 3 (Three) Times a Day. 90 tablet 1   • topiramate (TOPAMAX) 100 MG tablet Take 1 tablet by mouth 2 (Two) Times a Day. 60 tablet 5     No current facility-administered medications for this visit.     Current outpatient and discharge medications have been reconciled for the patient.  Reviewed by: ZITA Connolly      Allergies:    Ibuprofen    Review of Systems:   Review of Systems   Neurological: Positive for headache. Negative for weakness and memory problem.        Pain in all three branches of trigeminal nerve.   All other systems reviewed and are negative.    Objective      Vital Signs  Heart  Rate:  [117] 117  Resp:  [19] 19  BP: (128)/(78) 128/78    Physical Exam:  Physical Exam  Constitutional:       Appearance: Normal appearance.   HENT:      Head: Normocephalic.   Eyes:      Extraocular Movements: Extraocular movements intact.      Pupils: Pupils are equal, round, and reactive to light.   Cardiovascular:      Rate and Rhythm: Normal rate and regular rhythm.      Pulses: Normal pulses.   Pulmonary:      Effort: Pulmonary effort is normal.   Musculoskeletal:         General: Normal range of motion.      Cervical back: Normal range of motion and neck supple.   Skin:     General: Skin is warm and dry.      Capillary Refill: Capillary refill takes less than 2 seconds.   Neurological:      Gait: Gait is intact.   Psychiatric:         Mood and Affect: Mood normal.       Neurologic Exam:  Mental Status:    -Awake. Alert. Oriented to person, place & time.  -No word finding difficulties.  -No aphasia.  -No dysarthria.  -Follows simple commands.     CN II:  Full visual fields with confrontation.  Pupils equally reactive to light.  CN III, IV, VI:  Extraocular muscles function intact with no nystagmus.  CN V:  Facial sensory is symmetric.  Intense pain reported with movement of the face.   CN VII:  Facial motor symmetric.  CN VIII:  Gross hearing intact bilaterally.  CN IX/X:  Palate elevates symmetrically.  CN XI:  Shoulder shrug symmetric.  CN XII:  Tongue is midline on protrusion.     Motor: (strength out of 5:  1= minimal movement, 2 = movement in plane of gravity, 3 = movement against gravity, 4 = movement against some resistance, 5 = full strength)     -5/5 in bilateral biceps, triceps, brachioradialis, wrist extensors and intrinsic muscles of the hand.    -5/5 in bilateral hip flexors, quadriceps, hamstrings, gastrocsoleus complex, anterior tibialis and extensor hallucis longus.       Deep Tendon Reflexes:  -Right              Biceps: 2+         Triceps: 2+      Brachioradialis: 2+              Patella:  2+       Ankle: 2+         Babinski:  negative  -Left              Biceps: 2+         Triceps: 2+      Brachioradialis: 2+              Patella: 2+       Ankle: 2+         Babinski:  negative    Tone (Modified Ara Scale):  No appreciable increase in tone or rigidity noted.     Sensory:  -Intact to light touch, pinprick BUE (C5-T1) and BLE (L2-S1).     Coordination:  -Finger to nose intact BUEs  -Heel to shin intact BLEs  -No ataxia     Gait  -No signs of ataxia  -ambulates unassisted      Assessment/Plan     Impression:  • Trigeminal Neuralgia    Plan:  • PRoceed with infusion.  This is scheduled at 8:30am at the infusion center.  We will proceed with decadron 10mg IV, benadryl 50mg IV, compazine 10mg IV, and 500ml NS infusion.       • Increase topamax to 100mg twice daily as she has seen benefit from this.  I would like to optimize treatments with this.      • Still would consider Botox if all other avenues were to fail.      • She is to notify me of any questions, concerns, or changes in the interm.  She states understanding.     The patient and I have discussed the plan of care and she is in full agreement at this time.     Follow-Up:  Return in about 1 month (around 4/7/2022) for Trigeminal Neuralgia.      ZITA Connolly  03/07/22  16:24 CST

## 2022-03-08 ENCOUNTER — INFUSION (OUTPATIENT)
Dept: ONCOLOGY | Facility: HOSPITAL | Age: 32
End: 2022-03-08

## 2022-03-08 ENCOUNTER — TELEPHONE (OUTPATIENT)
Dept: NEUROLOGY | Facility: CLINIC | Age: 32
End: 2022-03-08

## 2022-03-08 VITALS
SYSTOLIC BLOOD PRESSURE: 114 MMHG | WEIGHT: 270 LBS | RESPIRATION RATE: 17 BRPM | BODY MASS INDEX: 44.98 KG/M2 | HEIGHT: 65 IN | OXYGEN SATURATION: 98 % | HEART RATE: 84 BPM | DIASTOLIC BLOOD PRESSURE: 73 MMHG | TEMPERATURE: 98 F

## 2022-03-08 DIAGNOSIS — G43.019 INTRACTABLE MIGRAINE WITHOUT AURA AND WITHOUT STATUS MIGRAINOSUS: Primary | ICD-10-CM

## 2022-03-08 PROCEDURE — 25010000002 DEXAMETHASONE PER 1 MG: Performed by: NURSE PRACTITIONER

## 2022-03-08 PROCEDURE — 96361 HYDRATE IV INFUSION ADD-ON: CPT

## 2022-03-08 PROCEDURE — 96374 THER/PROPH/DIAG INJ IV PUSH: CPT

## 2022-03-08 PROCEDURE — 25010000002 PROCHLORPERAZINE 10 MG/2ML SOLUTION: Performed by: PHYSICIAN ASSISTANT

## 2022-03-08 PROCEDURE — 96375 TX/PRO/DX INJ NEW DRUG ADDON: CPT

## 2022-03-08 PROCEDURE — 25010000002 DIPHENHYDRAMINE PER 50 MG: Performed by: PHYSICIAN ASSISTANT

## 2022-03-08 RX ORDER — DIPHENHYDRAMINE HYDROCHLORIDE 50 MG/ML
50 INJECTION INTRAMUSCULAR; INTRAVENOUS ONCE
OUTPATIENT
Start: 2022-03-08

## 2022-03-08 RX ORDER — SODIUM CHLORIDE 9 MG/ML
500 INJECTION, SOLUTION INTRAVENOUS ONCE
OUTPATIENT
Start: 2022-03-08 | End: 2022-03-08

## 2022-03-08 RX ORDER — PROCHLORPERAZINE EDISYLATE 5 MG/ML
10 INJECTION INTRAMUSCULAR; INTRAVENOUS ONCE
Status: COMPLETED | OUTPATIENT
Start: 2022-03-08 | End: 2022-03-08

## 2022-03-08 RX ORDER — DIPHENHYDRAMINE HYDROCHLORIDE 50 MG/ML
50 INJECTION INTRAMUSCULAR; INTRAVENOUS ONCE
Status: COMPLETED | OUTPATIENT
Start: 2022-03-08 | End: 2022-03-08

## 2022-03-08 RX ORDER — DEXAMETHASONE SODIUM PHOSPHATE 10 MG/ML
10 INJECTION INTRAMUSCULAR; INTRAVENOUS ONCE
Start: 2022-03-08 | End: 2022-03-08

## 2022-03-08 RX ORDER — SODIUM CHLORIDE 9 MG/ML
500 INJECTION, SOLUTION INTRAVENOUS ONCE
Status: COMPLETED | OUTPATIENT
Start: 2022-03-08 | End: 2022-03-08

## 2022-03-08 RX ORDER — PROCHLORPERAZINE EDISYLATE 5 MG/ML
10 INJECTION INTRAMUSCULAR; INTRAVENOUS ONCE
OUTPATIENT
Start: 2022-03-08

## 2022-03-08 RX ORDER — DEXAMETHASONE SODIUM PHOSPHATE 10 MG/ML
10 INJECTION INTRAMUSCULAR; INTRAVENOUS ONCE
Status: COMPLETED | OUTPATIENT
Start: 2022-03-08 | End: 2022-03-08

## 2022-03-08 RX ADMIN — PROCHLORPERAZINE EDISYLATE 10 MG: 5 INJECTION INTRAMUSCULAR; INTRAVENOUS at 09:09

## 2022-03-08 RX ADMIN — DEXAMETHASONE SODIUM PHOSPHATE 10 MG: 10 INJECTION INTRAMUSCULAR; INTRAVENOUS at 09:09

## 2022-03-08 RX ADMIN — DIPHENHYDRAMINE HYDROCHLORIDE 50 MG: 50 INJECTION, SOLUTION INTRAMUSCULAR; INTRAVENOUS at 09:09

## 2022-03-08 RX ADMIN — SODIUM CHLORIDE 500 ML: 9 INJECTION, SOLUTION INTRAVENOUS at 09:08

## 2022-03-08 NOTE — TELEPHONE ENCOUNTER
I spoke with Jaquelin and she is doing a little better post infusion for migraine. She states she is improving. States she usually take a couple days to feel better. She states she feels much better than yesterday.  I will call and check on her again later this week. Pt will call if needed. NARINDER

## 2022-03-14 ENCOUNTER — TELEPHONE (OUTPATIENT)
Dept: NEUROLOGY | Facility: CLINIC | Age: 32
End: 2022-03-14

## 2022-03-18 ENCOUNTER — OFFICE VISIT (OUTPATIENT)
Dept: NEUROLOGY | Facility: CLINIC | Age: 32
End: 2022-03-18

## 2022-03-18 VITALS
HEART RATE: 88 BPM | WEIGHT: 270 LBS | BODY MASS INDEX: 44.98 KG/M2 | OXYGEN SATURATION: 99 % | SYSTOLIC BLOOD PRESSURE: 112 MMHG | HEIGHT: 65 IN | RESPIRATION RATE: 18 BRPM | DIASTOLIC BLOOD PRESSURE: 70 MMHG

## 2022-03-18 DIAGNOSIS — G50.0 TRIGEMINAL NEURALGIA OF RIGHT SIDE OF FACE: ICD-10-CM

## 2022-03-18 PROCEDURE — 64505 N BLOCK SPENOPALATINE GANGL: CPT | Performed by: NURSE PRACTITIONER

## 2022-03-18 PROCEDURE — 99214 OFFICE O/P EST MOD 30 MIN: CPT | Performed by: NURSE PRACTITIONER

## 2022-03-18 NOTE — PROGRESS NOTES
Neurology Progress Note      Chief Complaint:    Trigeminal Neuralgia    Subjective     Subjective:  Jaquelin Rivera is a 31 y.o. female who presents today for trigeminal neuralgia.  She was last seen by me on 3/7/2022 where she was having a significant flair of pain in her right trigeminal nerve affecting all 3 branches of the trigeminal nerve.  She went for infusion with steroid which provided no relief.  She presents today for evaluation and potential SPG block to hopefully help with her symptoms.      She has had no change in intensity of her symptoms.  She reports that she continues to have burning and shooting pains in all 3 branches of the trigeminal nerve.  She continues her topamax, trileptal, and lyrica as prescribed.  She has had gamma knife back in  at [facility].  She has had evaluation at the facial pain clinic at  with minimal treatments offered.  She has not seen  neurosurgery for evaluation of vascular decompression evaluation.      Past Medical History:   Diagnosis Date   • Contraception    • Dyspareunia    • Hypertension    • Insulin resistance    • Intractable migraine without aura and without status migrainosus 2021   • Intractable migraine without aura and without status migrainosus 2021   • IUD check up    • Ovarian cyst    • PCOS (polycystic ovarian syndrome)    • Trigeminal neuralgia    • Vitamin D deficiency      Past Surgical History:   Procedure Laterality Date   •  SECTION N/A 1/10/2018    Procedure:  SECTION PRIMARY;  Surgeon: Soham Osorio MD;  Location: [facility] LABOR DELIVERY;  Service:    • TONSILLECTOMY AND ADENOIDECTOMY      hemorrhaged with surgical repair 3 days after surgery   • WISDOM TOOTH EXTRACTION       Family History   Problem Relation Age of Onset   • No Known Problems Mother    • No Known Problems Father    • No Known Problems Maternal Grandmother    • No Known Problems Maternal Grandfather    • Leukemia Paternal  Grandmother    • No Known Problems Paternal Grandfather      Social History     Tobacco Use   • Smoking status: Never Smoker   • Smokeless tobacco: Never Used   Vaping Use   • Vaping Use: Never used   Substance Use Topics   • Alcohol use: Yes     Comment: occasional   • Drug use: No     Medications:  Current Outpatient Medications   Medication Sig Dispense Refill   • celecoxib (CeleBREX) 100 MG capsule Take 1 capsule by mouth Daily. 30 capsule 5   • Diclofenac Sodium (VOLTAREN) 1 % gel gel Apply 4 g topically to the appropriate area as directed 4 (Four) Times a Day As Needed (pain). 100 g 0   • LO LOESTRIN FE 1 MG-10 MCG / 10 MCG tablet TAKE 1 TABLET BY MOUTH ONCE DAILY 28 tablet 6   • Lyrica 200 MG capsule Take 1 capsule by mouth 3 (Three) Times a Day. 90 capsule 2   • OXcarbazepine (Trileptal) 600 MG tablet Take 1 tablet by mouth 3 (Three) Times a Day. 90 tablet 1   • topiramate (TOPAMAX) 100 MG tablet Take 1 tablet by mouth 2 (Two) Times a Day. 60 tablet 5     No current facility-administered medications for this visit.     Current outpatient and discharge medications have been reconciled for the patient.  Reviewed by: ZITA Connolly      Allergies:    Ibuprofen    Review of Systems:   Review of Systems   Neurological:        Right sided facial pain.   All other systems reviewed and are negative.    Objective      Vital Signs  Heart Rate:  [88] 88  Resp:  [18] 18  BP: (112)/(70) 112/70    Physical Exam:  Physical Exam  Vitals reviewed.   Constitutional:       Appearance: Normal appearance. She is obese.   HENT:      Head: Normocephalic.   Eyes:      Extraocular Movements: Extraocular movements intact.      Pupils: Pupils are equal, round, and reactive to light.   Cardiovascular:      Rate and Rhythm: Normal rate and regular rhythm.      Pulses: Normal pulses.   Pulmonary:      Effort: Pulmonary effort is normal.   Musculoskeletal:         General: Normal range of motion.      Cervical back: Normal range of  motion and neck supple.   Skin:     General: Skin is warm and dry.      Capillary Refill: Capillary refill takes less than 2 seconds.   Neurological:      Gait: Gait is intact.   Psychiatric:         Mood and Affect: Mood normal.       Neurologic Exam:  Mental Status:    -Awake. Alert. Oriented to person, place & time.  -No word finding difficulties.  -No aphasia.  -No dysarthria.  -Follows simple commands.     CN II:  Full visual fields with confrontation.  Pupils equally reactive to light.  CN III, IV, VI:  Extraocular muscles function intact with no nystagmus.  CN V:  Facial sensory is symmetric.  Burning and shooting pains in right side.  CN VII:  Facial motor symmetric.  CN VIII:  Gross hearing intact bilaterally.  CN IX/X:  Palate elevates symmetrically.  CN XI:  Shoulder shrug symmetric.  CN XII:  Tongue is midline on protrusion.     Motor: (strength out of 5:  1= minimal movement, 2 = movement in plane of gravity, 3 = movement against gravity, 4 = movement against some resistance, 5 = full strength)     -5/5 in bilateral biceps, triceps, brachioradialis, wrist extensors and intrinsic muscles of the hand.    -5/5 in bilateral hip flexors, quadriceps, hamstrings, gastrocsoleus complex, anterior tibialis and extensor hallucis longus.       Deep Tendon Reflexes:  -Right              Biceps: 2+         Triceps: 2+      Brachioradialis: 2+              Patella: 2+       Ankle: 2+         Babinski:  negative  -Left              Biceps: 2+         Triceps: 2+      Brachioradialis: 2+              Patella: 2+       Ankle: 2+         Babinski:  negative    Tone (Modified Ara Scale):  No appreciable increase in tone or rigidity noted.     Sensory:  -Intact to light touch, pinprick BUE (C5-T1) and BLE (L2-S1).     Coordination:  -Finger to nose intact BUEs  -Heel to shin intact BLEs  -No ataxia     Gait  -No signs of ataxia  -ambulates unassisted        PROCEUDRE NOTE:    Sphenopalatine ganglion block:    After  informed consent was obtained, patient was placed in a supine position with the chin lifted.  A 1 inch portion of silicone catheter taken from a butterfly needle was used used to introduce lidocaine into the left nares with 2.0 mL 1% lidocaine placed.  Procedure was then repeated on the right nares with 2.0 mL 1% lidocaine placed.    There were no complications to the procedure.    Subjective pain assessment (patient reported @ 1 minute post-procedure):  Pre-procedural pain: 9/10  Post-procedure pain: 6/10    Assessment/Plan     Impression:  Right Trigeminal Neuralgia    Plan:  SPG Block today.     Will refer to Dr. Curtis at  Neurosurgery for evaluation of potential vascular decompression for trigeminal neuralgia.       Continue Trileptal 600mg 3 tiems daily.    Continue lyrica 200mg 3 times daily.    Continue Topiramate 100mg twice daily.    She is to call with any questions in the interm. She states understanding.      The patient and I have discussed the plan of care and she is in full agreement at this time.     Follow-Up:  Continue with current f/u in April      ZITA Connolly  03/18/22  15:58 CDT

## 2022-03-21 ENCOUNTER — TELEPHONE (OUTPATIENT)
Dept: NEUROLOGY | Facility: CLINIC | Age: 32
End: 2022-03-21

## 2022-03-21 NOTE — TELEPHONE ENCOUNTER
SPOKE TO MERARI AND SHE IS DOING MUCH BETTER TODAY AND SHE IS GOOD AT WORK. SHE VERY HAPPY. LET HER KNOW WE SENT REF. TO [facility]. PT VERY PLEASED. LS

## 2022-04-01 DIAGNOSIS — G50.0 TRIGEMINAL NEURALGIA OF RIGHT SIDE OF FACE: ICD-10-CM

## 2022-04-01 RX ORDER — OXCARBAZEPINE 600 MG/1
TABLET, FILM COATED ORAL
Qty: 90 TABLET | Refills: 1 | Status: SHIPPED | OUTPATIENT
Start: 2022-04-01 | End: 2022-05-19

## 2022-04-01 NOTE — TELEPHONE ENCOUNTER
Rx Refill Note  Requested Prescriptions     Pending Prescriptions Disp Refills   • OXcarbazepine (TRILEPTAL) 600 MG tablet [Pharmacy Med Name: OXCARBAZEPINE 600MG TABLETS] 90 tablet 1     Sig: TAKE 1 TABLET BY MOUTH THREE TIMES DAILY      Last office visit with prescribing clinician: 3/1/2022      Next office visit with prescribing clinician: Visit date not found            Maia Hall MA  04/01/22, 10:22 CDT

## 2022-04-05 ENCOUNTER — OFFICE VISIT (OUTPATIENT)
Dept: FAMILY MEDICINE CLINIC | Facility: CLINIC | Age: 32
End: 2022-04-05

## 2022-04-05 VITALS
OXYGEN SATURATION: 98 % | BODY MASS INDEX: 44.98 KG/M2 | DIASTOLIC BLOOD PRESSURE: 78 MMHG | RESPIRATION RATE: 16 BRPM | SYSTOLIC BLOOD PRESSURE: 126 MMHG | TEMPERATURE: 97.5 F | HEIGHT: 65 IN | WEIGHT: 270 LBS | HEART RATE: 117 BPM

## 2022-04-05 DIAGNOSIS — J01.90 ACUTE NON-RECURRENT SINUSITIS, UNSPECIFIED LOCATION: Primary | ICD-10-CM

## 2022-04-05 PROCEDURE — 99213 OFFICE O/P EST LOW 20 MIN: CPT | Performed by: NURSE PRACTITIONER

## 2022-04-05 RX ORDER — AMOXICILLIN 500 MG/1
500 TABLET, FILM COATED ORAL 3 TIMES DAILY
Qty: 30 TABLET | Refills: 0 | Status: SHIPPED | OUTPATIENT
Start: 2022-04-05 | End: 2022-04-15

## 2022-04-05 NOTE — PROGRESS NOTES
Subjective   Chief Complaint:  Sinus pain and pressure    History of Present Illness:  This 31 y.o. female was seen in the office today.  Pressure starting last week-reports has tried to fight on her own but continually has thick sinus drainage and throat irritation.    Allergies   Allergen Reactions   • Ibuprofen      MAKES THROAT BURN      Current Outpatient Medications on File Prior to Visit   Medication Sig   • celecoxib (CeleBREX) 100 MG capsule Take 1 capsule by mouth Daily.   • Diclofenac Sodium (VOLTAREN) 1 % gel gel Apply 4 g topically to the appropriate area as directed 4 (Four) Times a Day As Needed (pain).   • LO LOESTRIN FE 1 MG-10 MCG / 10 MCG tablet TAKE 1 TABLET BY MOUTH ONCE DAILY   • Lyrica 200 MG capsule Take 1 capsule by mouth 3 (Three) Times a Day.   • OXcarbazepine (TRILEPTAL) 600 MG tablet TAKE 1 TABLET BY MOUTH THREE TIMES DAILY   • topiramate (TOPAMAX) 100 MG tablet Take 1 tablet by mouth 2 (Two) Times a Day.     No current facility-administered medications on file prior to visit.      Past Medical, Surgical, Social, and Family History:  Past Medical History:   Diagnosis Date   • Contraception    • Dyspareunia    • Hypertension    • Insulin resistance    • Intractable migraine without aura and without status migrainosus 2021   • Intractable migraine without aura and without status migrainosus 2021   • IUD check up    • Ovarian cyst    • PCOS (polycystic ovarian syndrome)    • Trigeminal neuralgia    • Vitamin D deficiency      Past Surgical History:   Procedure Laterality Date   •  SECTION N/A 1/10/2018    Procedure:  SECTION PRIMARY;  Surgeon: Soham Osorio MD;  Location: [facility] LABOR DELIVERY;  Service:    • TONSILLECTOMY AND ADENOIDECTOMY      hemorrhaged with surgical repair 3 days after surgery   • WISDOM TOOTH EXTRACTION       Social History     Socioeconomic History   • Marital status:    Tobacco Use   • Smoking status: Never Smoker   • Smokeless  "tobacco: Never Used   Vaping Use   • Vaping Use: Never used   Substance and Sexual Activity   • Alcohol use: Yes     Comment: occasional   • Drug use: No   • Sexual activity: Yes     Partners: Male     Birth control/protection: None, Pill     Family History   Problem Relation Age of Onset   • No Known Problems Mother    • No Known Problems Father    • No Known Problems Maternal Grandmother    • No Known Problems Maternal Grandfather    • Leukemia Paternal Grandmother    • No Known Problems Paternal Grandfather      Objective   Physical Exam  Vitals reviewed.   Constitutional:       General: She is not in acute distress.     Appearance: Normal appearance.   HENT:      Right Ear: A middle ear effusion is present.      Left Ear: A middle ear effusion is present.      Nose:      Right Sinus: Frontal sinus tenderness present.      Left Sinus: Frontal sinus tenderness present.      Mouth/Throat:      Pharynx: Oropharyngeal exudate present. No posterior oropharyngeal erythema.   Cardiovascular:      Rate and Rhythm: Normal rate and regular rhythm.   Pulmonary:      Effort: Pulmonary effort is normal.      Breath sounds: Normal breath sounds.     /78 (BP Location: Left arm)   Pulse 117   Temp 97.5 °F (36.4 °C)   Resp 16   Ht 165.1 cm (65\")   Wt 122 kg (270 lb)   SpO2 98%   BMI 44.93 kg/m²     Assessment/Plan   Diagnoses and all orders for this visit:    1. Acute non-recurrent sinusitis, unspecified location (Primary)    Other orders  -     amoxicillin (AMOXIL) 500 MG tablet; Take 1 tablet by mouth 3 (Three) Times a Day for 10 days.  Dispense: 30 tablet; Refill: 0    Discussion:  Advised and educated plan of care.      Patient's Body mass index is 44.93 kg/m². indicating that she is morbidly obese (BMI > 40 or > 35 with obesity - related health condition). Obesity-related health conditions include the following: none. Obesity is unchanged. BMI is is above average; BMI management plan is completed. We discussed " portion control and increasing exercise..    Follow-up:  Return if symptoms worsen or fail to improve.    Electronically signed by ZITA Crenshaw, 04/05/22, 4:02 PM CDT.

## 2022-04-10 ENCOUNTER — HOSPITAL ENCOUNTER (EMERGENCY)
Facility: HOSPITAL | Age: 32
Discharge: HOME | End: 2022-04-10
Attending: EMERGENCY MEDICINE | Admitting: EMERGENCY MEDICINE

## 2022-04-10 VITALS
BODY MASS INDEX: 43.65 KG/M2 | WEIGHT: 262 LBS | OXYGEN SATURATION: 98 % | DIASTOLIC BLOOD PRESSURE: 83 MMHG | TEMPERATURE: 98.2 F | HEART RATE: 81 BPM | HEIGHT: 65 IN | RESPIRATION RATE: 16 BRPM | SYSTOLIC BLOOD PRESSURE: 142 MMHG

## 2022-04-10 DIAGNOSIS — G50.0 TRIGEMINAL NEURALGIA: Primary | ICD-10-CM

## 2022-04-10 PROCEDURE — 0 HYDROMORPHONE 1 MG/ML SOLUTION: Performed by: EMERGENCY MEDICINE

## 2022-04-10 PROCEDURE — 96374 THER/PROPH/DIAG INJ IV PUSH: CPT

## 2022-04-10 PROCEDURE — 96375 TX/PRO/DX INJ NEW DRUG ADDON: CPT

## 2022-04-10 PROCEDURE — 25010000002 DEXAMETHASONE PER 1 MG: Performed by: EMERGENCY MEDICINE

## 2022-04-10 PROCEDURE — 99283 EMERGENCY DEPT VISIT LOW MDM: CPT

## 2022-04-10 RX ORDER — DEXAMETHASONE SODIUM PHOSPHATE 10 MG/ML
10 INJECTION INTRAMUSCULAR; INTRAVENOUS ONCE
Status: COMPLETED | OUTPATIENT
Start: 2022-04-10 | End: 2022-04-10

## 2022-04-10 RX ORDER — SODIUM CHLORIDE 0.9 % (FLUSH) 0.9 %
10 SYRINGE (ML) INJECTION AS NEEDED
Status: DISCONTINUED | OUTPATIENT
Start: 2022-04-10 | End: 2022-04-10 | Stop reason: HOSPADM

## 2022-04-10 RX ORDER — METHYLPREDNISOLONE 4 MG/1
TABLET ORAL
Qty: 21 TABLET | Refills: 0 | Status: SHIPPED | OUTPATIENT
Start: 2022-04-10 | End: 2022-05-04

## 2022-04-10 RX ADMIN — HYDROMORPHONE HYDROCHLORIDE 1 MG: 1 INJECTION, SOLUTION INTRAMUSCULAR; INTRAVENOUS; SUBCUTANEOUS at 11:47

## 2022-04-10 RX ADMIN — DEXAMETHASONE SODIUM PHOSPHATE 10 MG: 10 INJECTION INTRAMUSCULAR; INTRAVENOUS at 11:47

## 2022-04-10 NOTE — ED PROVIDER NOTES
Subjective   Patient is a 31-year-old female who presents to the ER with trigeminal neuralgia attack.  Patient states she has had pain in the right side of her face and her trigeminal nerve region for the last 4 days.  Patient describes it as a sharp, shooting and burning pain.  This is identical to her previous trigeminal neuralgia attacks.  Patient takes Topamax, Trileptal and Lyrica on a daily basis.  She has also tried gamma knife in the past.  She has currently been referred to neurosurgery at  to see if she can have a trigeminal nerve decompression.  Patient states that usually when she has an attack she requires steroids.  She denies any fever, chest pain, shortness of air, abdominal pain, nausea vomiting diarrhea, urinary changes, neurologic changes.          Review of Systems   Constitutional: Negative.    HENT:        Face pain   Eyes: Negative.    Respiratory: Negative.    Cardiovascular: Negative.    Gastrointestinal: Negative.    Endocrine: Negative.    Genitourinary: Negative.    Musculoskeletal: Negative.    Skin: Negative.    Allergic/Immunologic: Negative.    Neurological: Negative.    Hematological: Negative.    Psychiatric/Behavioral: Negative.    All other systems reviewed and are negative.      Past Medical History:   Diagnosis Date   • Contraception    • Dyspareunia    • Hypertension    • Insulin resistance    • Intractable migraine without aura and without status migrainosus 2021   • Intractable migraine without aura and without status migrainosus 2021   • IUD check up    • Ovarian cyst    • PCOS (polycystic ovarian syndrome)    • Trigeminal neuralgia    • Vitamin D deficiency        Allergies   Allergen Reactions   • Ibuprofen      MAKES THROAT BURN       Past Surgical History:   Procedure Laterality Date   •  SECTION N/A 1/10/2018    Procedure:  SECTION PRIMARY;  Surgeon: Soham Osorio MD;  Location: [facility] LABOR DELIVERY;  Service:    • TONSILLECTOMY AND  ADENOIDECTOMY      hemorrhaged with surgical repair 3 days after surgery   • WISDOM TOOTH EXTRACTION         Family History   Problem Relation Age of Onset   • No Known Problems Mother    • No Known Problems Father    • No Known Problems Maternal Grandmother    • No Known Problems Maternal Grandfather    • Leukemia Paternal Grandmother    • No Known Problems Paternal Grandfather        Social History     Socioeconomic History   • Marital status:    Tobacco Use   • Smoking status: Never Smoker   • Smokeless tobacco: Never Used   Vaping Use   • Vaping Use: Never used   Substance and Sexual Activity   • Alcohol use: Yes     Comment: occasional   • Drug use: No   • Sexual activity: Yes     Partners: Male     Birth control/protection: None, Pill           Objective   Physical Exam  Vitals and nursing note reviewed.   Constitutional:       Appearance: She is well-developed.   HENT:      Head: Normocephalic and atraumatic.      Comments: Tenderness to palpation to the right side of her face in the trigeminal nerve distribution, no obvious swelling or signs of infection     Right Ear: Tympanic membrane normal.      Left Ear: Tympanic membrane normal.      Ears:      Comments: Dry ear canals bilaterally  Eyes:      Conjunctiva/sclera: Conjunctivae normal.      Pupils: Pupils are equal, round, and reactive to light.   Cardiovascular:      Rate and Rhythm: Normal rate and regular rhythm.      Heart sounds: Normal heart sounds.   Pulmonary:      Effort: Pulmonary effort is normal.      Breath sounds: Normal breath sounds.   Abdominal:      Palpations: Abdomen is soft.      Tenderness: There is no abdominal tenderness.   Musculoskeletal:         General: No deformity. Normal range of motion.      Cervical back: Normal range of motion.   Skin:     General: Skin is warm.   Neurological:      Mental Status: She is alert and oriented to person, place, and time.      Cranial Nerves: Cranial nerves are intact.      Sensory:  Sensation is intact.      Motor: Motor function is intact.   Psychiatric:         Behavior: Behavior normal.         Procedures           ED Course      Patient was given decadron and dilaudid.    Pain resolved with treatment.  Patient was feeling much better.  She will be discharged home with a Medrol Dosepak to follow-up with her PCP and neurologist.  She is also to keep her appointment with neurosurgery at .  She is return to the ER for any worsening or new pain or other concerns.  Patient agreeable.                                           MDM    Final diagnoses:   Trigeminal neuralgia       ED Disposition  ED Disposition     ED Disposition   Discharge    Condition   Good    Comment   --             Josef Ramsey MD  [address] [place] [id number]  [phone]    Schedule an appointment as soon as possible for a visit            Medication List      New Prescriptions    methylPREDNISolone 4 MG dose pack  Commonly known as: MEDROL  Take as directed on package instructions.           Where to Get Your Medications      These medications were sent to [facility][id number] - [place], [place] - [address] [place] [address] AT [address] [facility] - [phone]  - [phone] FX  [address] [place] [address]    Phone: [phone]   · methylPREDNISolone 4 MG dose pack          Erika Winston MD  04/10/22 [id number]

## 2022-04-12 ENCOUNTER — TELEPHONE (OUTPATIENT)
Dept: NEUROLOGY | Facility: CLINIC | Age: 32
End: 2022-04-12

## 2022-04-12 NOTE — TELEPHONE ENCOUNTER
Spoke with Jaquelin and she was having pain, went to er the other day and this was helpful. Orders sent to [facility] for consult. NARINDER

## 2022-04-15 DIAGNOSIS — G50.0 TRIGEMINAL NEURALGIA OF RIGHT SIDE OF FACE: ICD-10-CM

## 2022-04-15 RX ORDER — PREGABALIN 200 MG/1
200 CAPSULE ORAL 3 TIMES DAILY
Qty: 90 CAPSULE | Refills: 2 | Status: SHIPPED | OUTPATIENT
Start: 2022-04-15 | End: 2022-09-07

## 2022-05-03 ENCOUNTER — TELEPHONE (OUTPATIENT)
Dept: NEUROLOGY | Facility: CLINIC | Age: 32
End: 2022-05-03

## 2022-05-03 DIAGNOSIS — G50.0 TRIGEMINAL NEURALGIA OF RIGHT SIDE OF FACE: Primary | ICD-10-CM

## 2022-05-03 NOTE — TELEPHONE ENCOUNTER
Spoke to Jaquelin and advised her to go to the ER or Urgent care for her Migraine. We have sent a ref to [facility]. Pt understood and she will let me know if she needs anything else. LS

## 2022-05-04 ENCOUNTER — OFFICE VISIT (OUTPATIENT)
Dept: FAMILY MEDICINE CLINIC | Facility: CLINIC | Age: 32
End: 2022-05-04

## 2022-05-04 VITALS
OXYGEN SATURATION: 97 % | DIASTOLIC BLOOD PRESSURE: 82 MMHG | HEIGHT: 65 IN | BODY MASS INDEX: 42.99 KG/M2 | WEIGHT: 258 LBS | TEMPERATURE: 97.8 F | SYSTOLIC BLOOD PRESSURE: 136 MMHG | HEART RATE: 106 BPM

## 2022-05-04 DIAGNOSIS — G50.0 TRIGEMINAL NEURALGIA: Primary | ICD-10-CM

## 2022-05-04 PROCEDURE — 99213 OFFICE O/P EST LOW 20 MIN: CPT | Performed by: NURSE PRACTITIONER

## 2022-05-04 PROCEDURE — 96372 THER/PROPH/DIAG INJ SC/IM: CPT | Performed by: NURSE PRACTITIONER

## 2022-05-04 RX ORDER — TRIAMCINOLONE ACETONIDE 40 MG/ML
40 INJECTION, SUSPENSION INTRA-ARTICULAR; INTRAMUSCULAR ONCE
Status: COMPLETED | OUTPATIENT
Start: 2022-05-04 | End: 2022-05-04

## 2022-05-04 RX ORDER — PREDNISONE 10 MG/1
TABLET ORAL
Qty: 30 TABLET | Refills: 0 | Status: SHIPPED | OUTPATIENT
Start: 2022-05-04 | End: 2022-05-16

## 2022-05-04 RX ADMIN — TRIAMCINOLONE ACETONIDE 40 MG: 40 INJECTION, SUSPENSION INTRA-ARTICULAR; INTRAMUSCULAR at 15:06

## 2022-05-04 NOTE — PROGRESS NOTES
Subjective   Chief Complaint:  Trigeminal neuralgia flareup    History of Present Illness:  This 31 y.o. female was seen in the office today.  Long history of trigeminal neuralgia and TM pain.  Reports acute flareup this week.    Allergies   Allergen Reactions   • Ibuprofen      MAKES THROAT BURN      Current Outpatient Medications on File Prior to Visit   Medication Sig   • celecoxib (CeleBREX) 100 MG capsule Take 1 capsule by mouth Daily.   • Diclofenac Sodium (VOLTAREN) 1 % gel gel Apply 4 g topically to the appropriate area as directed 4 (Four) Times a Day As Needed (pain).   • LO LOESTRIN FE 1 MG-10 MCG / 10 MCG tablet TAKE 1 TABLET BY MOUTH ONCE DAILY   • OXcarbazepine (TRILEPTAL) 600 MG tablet TAKE 1 TABLET BY MOUTH THREE TIMES DAILY   • pregabalin (Lyrica) 200 MG capsule Take 1 capsule by mouth 3 (Three) Times a Day.   • topiramate (TOPAMAX) 100 MG tablet Take 1 tablet by mouth 2 (Two) Times a Day.     No current facility-administered medications on file prior to visit.      Past Medical, Surgical, Social, and Family History:  Past Medical History:   Diagnosis Date   • Contraception    • Dyspareunia    • Hypertension    • Insulin resistance    • Intractable migraine without aura and without status migrainosus 2021   • Intractable migraine without aura and without status migrainosus 2021   • IUD check up    • Ovarian cyst    • PCOS (polycystic ovarian syndrome)    • Trigeminal neuralgia    • Vitamin D deficiency      Past Surgical History:   Procedure Laterality Date   •  SECTION N/A 1/10/2018    Procedure:  SECTION PRIMARY;  Surgeon: Soham Osorio MD;  Location: [facility] LABOR DELIVERY;  Service:    • TONSILLECTOMY AND ADENOIDECTOMY      hemorrhaged with surgical repair 3 days after surgery   • WISDOM TOOTH EXTRACTION       Social History     Socioeconomic History   • Marital status:    Tobacco Use   • Smoking status: Never Smoker   • Smokeless tobacco: Never Used   Vaping  "Use   • Vaping Use: Never used   Substance and Sexual Activity   • Alcohol use: Yes     Comment: occasional   • Drug use: No   • Sexual activity: Yes     Partners: Male     Birth control/protection: None, Pill     Family History   Problem Relation Age of Onset   • No Known Problems Mother    • No Known Problems Father    • No Known Problems Maternal Grandmother    • No Known Problems Maternal Grandfather    • Leukemia Paternal Grandmother    • No Known Problems Paternal Grandfather      Objective   Physical Exam  Vitals reviewed.   Constitutional:       General: She is not in acute distress.     Appearance: Normal appearance.   HENT:      Head:      Comments: Right TM tenderness, right facial tenderness  Cardiovascular:      Rate and Rhythm: Normal rate and regular rhythm.   Pulmonary:      Effort: Pulmonary effort is normal.      Breath sounds: Normal breath sounds.     /82   Pulse 106   Temp 97.8 °F (36.6 °C)   Ht 165.1 cm (65\")   Wt 117 kg (258 lb)   SpO2 97%   BMI 42.93 kg/m²     Assessment/Plan   Diagnoses and all orders for this visit:    1. Trigeminal neuralgia (Primary)  -     predniSONE (DELTASONE) 10 MG tablet; Take 4 tablets by mouth Daily for 3 days, THEN 3 tablets Daily for 3 days, THEN 2 tablets Daily for 3 days, THEN 1 tablet Daily for 3 days.  Dispense: 30 tablet; Refill: 0  -     triamcinolone acetonide (KENALOG-40) injection 40 mg    Discussion:  Advised and educated plan of care.       Follow-up:  No follow-ups on file.    Electronically signed by ZITA Crenshaw, 05/04/22, 2:23 PM CDT.  "

## 2022-05-18 DIAGNOSIS — G50.0 TRIGEMINAL NEURALGIA OF RIGHT SIDE OF FACE: ICD-10-CM

## 2022-05-19 RX ORDER — OXCARBAZEPINE 600 MG/1
TABLET, FILM COATED ORAL
Qty: 90 TABLET | Refills: 1 | Status: SHIPPED | OUTPATIENT
Start: 2022-05-19 | End: 2022-07-12

## 2022-05-19 NOTE — TELEPHONE ENCOUNTER
Rx Refill Note  Requested Prescriptions     Pending Prescriptions Disp Refills   • OXcarbazepine (TRILEPTAL) 600 MG tablet [Pharmacy Med Name: OXCARBAZEPINE 600MG TABLETS] 90 tablet 1     Sig: TAKE 1 TABLET BY MOUTH THREE TIMES DAILY      Last office visit with prescribing clinician: 5/4/2022      Next office visit with prescribing clinician: Visit date not found            Maia Hall MA  05/19/22, 10:49 CDT

## 2022-05-23 ENCOUNTER — HOSPITAL ENCOUNTER (OUTPATIENT)
Dept: MRI IMAGING | Facility: HOSPITAL | Age: 32
Discharge: HOME | End: 2022-05-23
Admitting: NURSE PRACTITIONER

## 2022-05-23 DIAGNOSIS — G50.0 TRIGEMINAL NEURALGIA OF RIGHT SIDE OF FACE: ICD-10-CM

## 2022-05-23 PROCEDURE — 0 GADOBENATE DIMEGLUMINE 529 MG/ML SOLUTION: Performed by: NURSE PRACTITIONER

## 2022-05-23 PROCEDURE — 70553 MRI BRAIN STEM W/O & W/DYE: CPT

## 2022-05-23 PROCEDURE — A9577 INJ MULTIHANCE: HCPCS | Performed by: NURSE PRACTITIONER

## 2022-05-23 RX ADMIN — GADOBENATE DIMEGLUMINE 20 ML: 529 INJECTION, SOLUTION INTRAVENOUS at 17:35

## 2022-05-24 ENCOUNTER — OFFICE VISIT (OUTPATIENT)
Dept: NEUROLOGY | Facility: CLINIC | Age: 32
End: 2022-05-24

## 2022-05-24 VITALS
WEIGHT: 245 LBS | BODY MASS INDEX: 40.82 KG/M2 | HEIGHT: 65 IN | RESPIRATION RATE: 17 BRPM | SYSTOLIC BLOOD PRESSURE: 132 MMHG | OXYGEN SATURATION: 99 % | DIASTOLIC BLOOD PRESSURE: 66 MMHG

## 2022-05-24 DIAGNOSIS — G50.0 TRIGEMINAL NEURALGIA OF RIGHT SIDE OF FACE: ICD-10-CM

## 2022-05-24 DIAGNOSIS — R29.898 TMJ CLICK: Primary | ICD-10-CM

## 2022-05-24 PROCEDURE — 99214 OFFICE O/P EST MOD 30 MIN: CPT | Performed by: NURSE PRACTITIONER

## 2022-05-24 RX ORDER — PHENYTOIN SODIUM 100 MG/1
100 CAPSULE, EXTENDED RELEASE ORAL 2 TIMES DAILY
Qty: 90 CAPSULE | Refills: 2 | Status: SHIPPED | OUTPATIENT
Start: 2022-05-24 | End: 2022-08-02

## 2022-05-24 NOTE — PROGRESS NOTES
Neurology Progress Note      Chief Complaint:    Trigeminal Neuralgia    Subjective     Subjective:  Jqauelin Rivera is a 31 y.o. female who presents today for trigeminal neuralgia.  She is routinely followed by Dr. Josef Ramsey MD for primary care.  She was last seen by me on 3/18/2022.  She did receive SPG block at that time that she reports helped for a few days.  Since that time her right sided facial pain has recurred.  She continues to be managed on Celebrex 100mg daily, lyrica 200mg 3 times daily and Topamax 100mg twice daily.  She continues to report significant pains in primarily the V2 branch of the right trigeminal nerve.  She reports burning, stabbing, and paresthesias.  She reports some intermittent pains of the left V2 branch of the trigeminal nerve.  She has had steroids in the past which she states takes the edge off the pain.  She notes that she has been missing a significant amount of work recently.  We are still in the process of working on [facility] neurosurgery referral for evaluation of further gamma knife or decompression surgery.  She did have MRI which was stable.    She continues to have clicking of bilateral TM joints.  She has not seen oral surgery.  This may be beneficial to see how this is playing into her current symptoms. She denies any symptoms of YAZMIN as previously noted.      Past Medical History:   Diagnosis Date   • Contraception    • Dyspareunia    • Hypertension    • Insulin resistance    • Intractable migraine without aura and without status migrainosus 2021   • Intractable migraine without aura and without status migrainosus 2021   • IUD check up    • Ovarian cyst    • PCOS (polycystic ovarian syndrome)    • Trigeminal neuralgia    • Vitamin D deficiency      Past Surgical History:   Procedure Laterality Date   •  SECTION N/A 1/10/2018    Procedure:  SECTION PRIMARY;  Surgeon: Soham Osorio MD;  Location: [facility] LABOR DELIVERY;  Service:    •  TONSILLECTOMY AND ADENOIDECTOMY      hemorrhaged with surgical repair 3 days after surgery   • WISDOM TOOTH EXTRACTION       Family History   Problem Relation Age of Onset   • No Known Problems Mother    • No Known Problems Father    • No Known Problems Maternal Grandmother    • No Known Problems Maternal Grandfather    • Leukemia Paternal Grandmother    • No Known Problems Paternal Grandfather      Social History     Tobacco Use   • Smoking status: Never Smoker   • Smokeless tobacco: Never Used   Vaping Use   • Vaping Use: Never used   Substance Use Topics   • Alcohol use: Yes     Comment: occasional   • Drug use: No     Medications:  Current Outpatient Medications   Medication Sig Dispense Refill   • celecoxib (CeleBREX) 100 MG capsule Take 1 capsule by mouth Daily. 30 capsule 5   • Diclofenac Sodium (VOLTAREN) 1 % gel gel Apply 4 g topically to the appropriate area as directed 4 (Four) Times a Day As Needed (pain). 100 g 0   • LO LOESTRIN FE 1 MG-10 MCG / 10 MCG tablet TAKE 1 TABLET BY MOUTH ONCE DAILY 28 tablet 6   • OXcarbazepine (TRILEPTAL) 600 MG tablet TAKE 1 TABLET BY MOUTH THREE TIMES DAILY 90 tablet 1   • pregabalin (Lyrica) 200 MG capsule Take 1 capsule by mouth 3 (Three) Times a Day. 90 capsule 2   • phenytoin ER (Dilantin) 100 MG capsule Take 1 capsule by mouth 2 (Two) Times a Day. 90 capsule 2     No current facility-administered medications for this visit.     Current outpatient and discharge medications have been reconciled for the patient.  Reviewed by: ZITA Connolly      Allergies:    Ibuprofen    Review of Systems:   Review of Systems   Neurological: Negative for facial asymmetry (facial pain.) and weakness.   All other systems reviewed and are negative.    Objective      Vital Signs  Resp:  [17] 17  BP: (132)/(66) 132/66    Physical Exam:  Physical Exam  Constitutional:       Appearance: Normal appearance.   HENT:      Head: Normocephalic.   Eyes:      Extraocular Movements: Extraocular  movements intact.      Pupils: Pupils are equal, round, and reactive to light.   Cardiovascular:      Rate and Rhythm: Normal rate and regular rhythm.      Pulses: Normal pulses.   Pulmonary:      Effort: Pulmonary effort is normal.   Musculoskeletal:         General: Normal range of motion.      Cervical back: Normal range of motion and neck supple.   Skin:     General: Skin is warm and dry.      Capillary Refill: Capillary refill takes less than 2 seconds.   Neurological:      Gait: Gait is intact.   Psychiatric:         Mood and Affect: Mood normal.       Neurologic Exam:  Mental Status:    -Awake. Alert. Oriented to person, place & time.  -No word finding difficulties.  -No aphasia.  -No dysarthria.  -Follows simple commands.     CN II:  Full visual fields with confrontation.  Pupils equally reactive to light.  CN III, IV, VI:  Extraocular muscles function intact with no nystagmus.  CN V:  Facial sensory is symmetric. Noted burning and sharp pains on the right.   CN VII:  Facial motor symmetric. Hesitancy to movement with anticipation of pain.   CN VIII:  Gross hearing intact bilaterally.  CN IX/X:  Palate elevates symmetrically.  CN XI:  Shoulder shrug symmetric.  CN XII:  Tongue is midline on protrusion.     Motor: (strength out of 5:  1= minimal movement, 2 = movement in plane of gravity, 3 = movement against gravity, 4 = movement against some resistance, 5 = full strength)     -5/5 in bilateral biceps, triceps, brachioradialis, wrist extensors and intrinsic muscles of the hand.    -5/5 in bilateral hip flexors, quadriceps, hamstrings, gastrocsoleus complex, anterior tibialis and extensor hallucis longus.       Deep Tendon Reflexes:  -Right              Biceps: 2+         Triceps: 2+      Brachioradialis: 2+              Patella: 2+       Ankle: 2+         Babinski:  negative  -Left              Biceps: 2+         Triceps: 2+      Brachioradialis: 2+              Patella: 2+       Ankle: 2+         Babinski:   negative    Tone (Modified Ara Scale):  No appreciable increase in tone or rigidity noted.     Sensory:  -Intact to light touch, pinprick BUE (C5-T1) and BLE (L2-S1).  -Pain/tenderness of right face.      Coordination:  -Finger to nose intact BUEs  -Heel to shin intact BLEs  -No ataxia     Gait  -No signs of ataxia  -ambulates unassisted    Assessment/Plan     Impression:  • Right Trigeminal Neuralgia    Plan:  • Continue Celebrex 100mg daily.     • Continue Lyrica 200mg three times daily.      • Start Dilantin 100mg twice daily.      • Wean and discontinue Topamax.  Instructed her on weaning instructions.      • I have discussed with her about the importance of not getting pregnant and staying on contraception at this time.      • Continue with [facility] neurosurgical referral.      • Will have her see Oral surgery to ensure that TM dysfunction is not worsening some of her pain.     • She is to call me with any worsening or changes in the interm.     The patient and I have discussed the plan of care and she is in full agreement at this time.     Follow-Up:  Return in about 2 months (around 7/24/2022) for Trigeminal Neuralgia.      Sourav Arguelles, ZITA  05/24/22  17:10 CDT

## 2022-06-10 ENCOUNTER — TRANSCRIBE ORDERS (OUTPATIENT)
Dept: LAB | Facility: HOSPITAL | Age: 32
End: 2022-06-10

## 2022-06-10 DIAGNOSIS — Z11.52 ENCOUNTER FOR SCREENING FOR COVID-19: Primary | ICD-10-CM

## 2022-06-16 ENCOUNTER — LAB (OUTPATIENT)
Dept: LAB | Facility: HOSPITAL | Age: 32
End: 2022-06-16

## 2022-06-16 DIAGNOSIS — Z11.52 ENCOUNTER FOR SCREENING FOR COVID-19: ICD-10-CM

## 2022-06-16 LAB — SARS-COV-2 ORF1AB RESP QL NAA+PROBE: NOT DETECTED

## 2022-06-16 PROCEDURE — C9803 HOPD COVID-19 SPEC COLLECT: HCPCS

## 2022-06-16 PROCEDURE — U0004 COV-19 TEST NON-CDC HGH THRU: HCPCS

## 2022-07-01 ENCOUNTER — TELEPHONE (OUTPATIENT)
Dept: NEUROLOGY | Facility: CLINIC | Age: 32
End: 2022-07-01

## 2022-07-01 NOTE — TELEPHONE ENCOUNTER
Provider: ZITA ALLEN    Caller: MERARI    Relationship to Patient: PT     Phone Number: [phone]    Reason for Call: PT CALLED REGARDING LETTER RECEIVED FOR RESULTS OF LAB WORK FOR CREATINE.  PT DID NOT KNOW THAT THIS WAS ORDERED.  STATED SHE IS UNABLE TO DRIVE UNTIL 7-11-22 D/T HAVING  SURGERY 2 WEEKS AGO.  STATED SHE HAD THE MICRO VASCULAR DECOMPRESSION.  PT WANTS TO KNOW IF THAT WILL BE ALRIGHT.    PLEASE CALL AND ADVISE.

## 2022-07-12 DIAGNOSIS — G50.0 TRIGEMINAL NEURALGIA OF RIGHT SIDE OF FACE: ICD-10-CM

## 2022-07-12 RX ORDER — OXCARBAZEPINE 600 MG/1
TABLET, FILM COATED ORAL
Qty: 90 TABLET | Refills: 1 | Status: SHIPPED | OUTPATIENT
Start: 2022-07-12 | End: 2022-07-12 | Stop reason: SDUPTHER

## 2022-07-12 NOTE — TELEPHONE ENCOUNTER
Rx Refill Note  Requested Prescriptions     Pending Prescriptions Disp Refills   • OXcarbazepine (TRILEPTAL) 600 MG tablet [Pharmacy Med Name: OXCARBAZEPINE 600MG TABLETS] 90 tablet 1     Sig: TAKE 1 TABLET BY MOUTH THREE TIMES DAILY      Last office visit with prescribing clinician: 5/4/2022      Next office visit with prescribing clinician: Visit date not found            Merlene Simeon LPN  07/12/22, 12:11 CDT

## 2022-07-12 NOTE — TELEPHONE ENCOUNTER
Caller: MERARI Vargas call back number: [phone]    Requested Prescriptions:OXCARBAZEPINE 600 MG          Requested Prescriptions      No prescriptions requested or ordered in this encounter      Pharmacy where request should be sent:    [id number] - [facility] [address] - [phone] PH - [phone] FX  [phone]    Additional details provided by patient: PT CALLING AS THERE IS CONFUSION ON RX SHE NEEDS [facility]RL[facility] TO SEND SCRIPT FOR RX. THIS KEEPS GOING TO HER DR IN [place] AND [facility] IS SUPPOSED TO BE FILLING THIS. PT WILL BE OUT OF RX ON Thursday AND NEEDS FILLED BY THEN. PLEASE CALL PT TO LET HER KNOW WHAT WILL BE DONE.      Does the patient have less than a 3 day supply:  [x] Yes  [] No    PLEASE ADVISE      Mando COHEN Rep   07/12/22 15:24 CDT

## 2022-07-13 RX ORDER — OXCARBAZEPINE 600 MG/1
600 TABLET, FILM COATED ORAL 3 TIMES DAILY
Qty: 90 TABLET | Refills: 0 | Status: SHIPPED | OUTPATIENT
Start: 2022-07-13 | End: 2022-08-08

## 2022-07-13 NOTE — TELEPHONE ENCOUNTER
Jaquelin said the script from her PCP has been cancelled.  Explained that Sourav is out of the office this week, one of the other providers can refill it.

## 2022-08-02 ENCOUNTER — OFFICE VISIT (OUTPATIENT)
Dept: NEUROLOGY | Facility: CLINIC | Age: 32
End: 2022-08-02

## 2022-08-02 VITALS
HEIGHT: 65 IN | BODY MASS INDEX: 40.82 KG/M2 | WEIGHT: 245 LBS | SYSTOLIC BLOOD PRESSURE: 142 MMHG | HEART RATE: 104 BPM | OXYGEN SATURATION: 98 % | DIASTOLIC BLOOD PRESSURE: 88 MMHG | RESPIRATION RATE: 17 BRPM

## 2022-08-02 DIAGNOSIS — G50.0 TRIGEMINAL NEURALGIA OF RIGHT SIDE OF FACE: Primary | ICD-10-CM

## 2022-08-02 PROCEDURE — 99213 OFFICE O/P EST LOW 20 MIN: CPT | Performed by: NURSE PRACTITIONER

## 2022-08-02 NOTE — PROGRESS NOTES
Neurology Progress Note      Chief Complaint:    Trigeminal Neuralgia    Subjective     Subjective:  Jaquelin Rivera is a 31 y.o. female who presents today for trigeminal neuralgia.  At our last visit we added dilantin 100mg twice daily in an attempt to further help her symptoms of severe trigeminal neuralgia.  Since that time, she was seen and evaluated by Dr. Kirsten MD at  neurosurgery where he performed microvascular decompression of the right trigeminal nerve.  This was performed on 2022.  She reports that since that time she has had complete pain freedom and only has some minor numbness and pressure of the right side of her face.  She states that her quality of life has drastically improved.  She cotninues to take celebrex 100mg daily, trileptal 600mg three times daily, lyrica 200mg 3 times daily, and dilantin 100mg twice daily.  She is hoping to start weaning off of medications at this point.     Past Medical History:   Diagnosis Date   • Contraception    • Dyspareunia    • Hypertension    • Insulin resistance    • Intractable migraine without aura and without status migrainosus 2021   • Intractable migraine without aura and without status migrainosus 2021   • IUD check up    • Ovarian cyst    • PCOS (polycystic ovarian syndrome)    • Trigeminal neuralgia    • Vitamin D deficiency      Past Surgical History:   Procedure Laterality Date   •  SECTION N/A 1/10/2018    Procedure:  SECTION PRIMARY;  Surgeon: Soham Osorio MD;  Location: [facility] LABOR DELIVERY;  Service:    • TONSILLECTOMY AND ADENOIDECTOMY      hemorrhaged with surgical repair 3 days after surgery   • WISDOM TOOTH EXTRACTION       Family History   Problem Relation Age of Onset   • No Known Problems Mother    • No Known Problems Father    • No Known Problems Maternal Grandmother    • No Known Problems Maternal Grandfather    • Leukemia Paternal Grandmother    • No Known Problems Paternal Grandfather       Social History     Tobacco Use   • Smoking status: Never Smoker   • Smokeless tobacco: Never Used   Vaping Use   • Vaping Use: Never used   Substance Use Topics   • Alcohol use: Yes     Comment: occasional   • Drug use: No     Medications:  Current Outpatient Medications   Medication Sig Dispense Refill   • celecoxib (CeleBREX) 100 MG capsule Take 1 capsule by mouth Daily. 30 capsule 5   • Diclofenac Sodium (VOLTAREN) 1 % gel gel Apply 4 g topically to the appropriate area as directed 4 (Four) Times a Day As Needed (pain). 100 g 0   • LO LOESTRIN FE 1 MG-10 MCG / 10 MCG tablet TAKE 1 TABLET BY MOUTH ONCE DAILY 28 tablet 6   • OXcarbazepine (TRILEPTAL) 600 MG tablet Take 1 tablet by mouth 3 (Three) Times a Day. 90 tablet 0   • pregabalin (Lyrica) 200 MG capsule Take 1 capsule by mouth 3 (Three) Times a Day. 90 capsule 2     No current facility-administered medications for this visit.     Current outpatient and discharge medications have been reconciled for the patient.  Reviewed by: ZITA Connolly      Allergies:    Ibuprofen    Review of Systems:   Review of Systems   Neurological:        Right sided facial pain.   All other systems reviewed and are negative.    Objective      Vital Signs  Heart Rate:  [104] 104  Resp:  [17] 17  BP: (142)/(88) 142/88    Physical Exam:  Physical Exam  Vitals reviewed.   Constitutional:       Appearance: Normal appearance. She is obese.   HENT:      Head: Normocephalic.   Eyes:      Extraocular Movements: Extraocular movements intact.      Pupils: Pupils are equal, round, and reactive to light.   Cardiovascular:      Rate and Rhythm: Normal rate and regular rhythm.      Pulses: Normal pulses.   Pulmonary:      Effort: Pulmonary effort is normal.   Musculoskeletal:         General: Normal range of motion.      Cervical back: Normal range of motion and neck supple.   Skin:     General: Skin is warm and dry.      Capillary Refill: Capillary refill takes less than 2 seconds.    Neurological:      Gait: Gait is intact.   Psychiatric:         Mood and Affect: Mood normal.       Neurologic Exam:  Mental Status:    -Awake. Alert. Oriented to person, place & time.  -No word finding difficulties.  -No aphasia.  -No dysarthria.  -Follows simple commands.     CN II:  Full visual fields with confrontation.  Pupils equally reactive to light.  CN III, IV, VI:  Extraocular muscles function intact with no nystagmus.  CN V:  Facial sensory is symmetric.  Burning and shooting pains in right side.  CN VII:  Facial motor symmetric.  CN VIII:  Gross hearing intact bilaterally.  CN IX/X:  Palate elevates symmetrically.  CN XI:  Shoulder shrug symmetric.  CN XII:  Tongue is midline on protrusion.     Motor: (strength out of 5:  1= minimal movement, 2 = movement in plane of gravity, 3 = movement against gravity, 4 = movement against some resistance, 5 = full strength)     -5/5 in bilateral biceps, triceps, brachioradialis, wrist extensors and intrinsic muscles of the hand.    -5/5 in bilateral hip flexors, quadriceps, hamstrings, gastrocsoleus complex, anterior tibialis and extensor hallucis longus.       Deep Tendon Reflexes:  -Right              Biceps: 2+         Triceps: 2+      Brachioradialis: 2+              Patella: 2+       Ankle: 2+         Babinski:  negative  -Left              Biceps: 2+         Triceps: 2+      Brachioradialis: 2+              Patella: 2+       Ankle: 2+         Babinski:  negative    Tone (Modified Ara Scale):  No appreciable increase in tone or rigidity noted.     Sensory:  -Intact to light touch, pinprick BUE (C5-T1) and BLE (L2-S1).     Coordination:  -Finger to nose intact BUEs  -Heel to shin intact BLEs  -No ataxia     Gait  -No signs of ataxia  -ambulates unassisted    Assessment/Plan     Impression:  • Right Trigeminal Neuralgia    Plan:   • Continue Trileptal 600mg 3 tiems daily.    · Continue lyrica 200mg 3 times daily.     · Continue Celebrex 100mg daily.      · Wean and stop Dilantin 100mg twice daily.  She will drop to 100mg daily for 1 week then stop.     · I would like to wean in this order after her dilantin is disontinued: celebrex, lyrica, then derease dosing of trileptal.  I discussed this with her and she is in agreement with this plan.     · She is to call with any questions in the interm. She states understanding.      The patient and I have discussed the plan of care and she is in full agreement at this time.     Follow-Up:  · Return in about 1 month (around 9/2/2022) for Trigeminal Neuralgia.      Sourav Arguelles, ZITA  08/02/22  14:45 CDT

## 2022-08-06 DIAGNOSIS — G50.0 TRIGEMINAL NEURALGIA OF RIGHT SIDE OF FACE: ICD-10-CM

## 2022-08-08 RX ORDER — OXCARBAZEPINE 600 MG/1
TABLET, FILM COATED ORAL
Qty: 90 TABLET | Refills: 1 | Status: SHIPPED | OUTPATIENT
Start: 2022-08-08 | End: 2022-12-19

## 2022-09-07 ENCOUNTER — OFFICE VISIT (OUTPATIENT)
Dept: NEUROLOGY | Facility: CLINIC | Age: 32
End: 2022-09-07

## 2022-09-07 VITALS
SYSTOLIC BLOOD PRESSURE: 116 MMHG | OXYGEN SATURATION: 99 % | BODY MASS INDEX: 40.82 KG/M2 | HEART RATE: 74 BPM | WEIGHT: 245 LBS | HEIGHT: 65 IN | RESPIRATION RATE: 17 BRPM | DIASTOLIC BLOOD PRESSURE: 70 MMHG

## 2022-09-07 DIAGNOSIS — G50.0 TRIGEMINAL NEURALGIA OF RIGHT SIDE OF FACE: Primary | ICD-10-CM

## 2022-09-07 PROCEDURE — 99213 OFFICE O/P EST LOW 20 MIN: CPT | Performed by: NURSE PRACTITIONER

## 2022-09-07 NOTE — PROGRESS NOTES
Neurology Progress Note      Chief Complaint:    Trigeminal Neuralgia    Subjective   History of Present Illness:  Jaquelin Rivera is a 31 y.o. female who presents today for trigeminal neuralgia.  She is routinely followed by Josef Ramsey MD for primary care.     Trigeminal Neuralgia  She continues to be pain free.  She denies any issues with her facial pain.  She has stopped Lyrica, Celebrex, and Dilantin at this point.  She is taking Trileptal 600mg once a day now.  She is very happy with her results.  She has no new complaints today.    Allergies:    Ibuprofen    Medications:  Current Outpatient Medications   Medication Sig Dispense Refill   • LO LOESTRIN FE 1 MG-10 MCG / 10 MCG tablet TAKE 1 TABLET BY MOUTH ONCE DAILY 28 tablet 6   • OXcarbazepine (TRILEPTAL) 600 MG tablet TAKE 1 TABLET BY MOUTH THREE TIMES DAILY 90 tablet 1     No current facility-administered medications for this visit.     Current outpatient and discharge medications have been reconciled for the patient.  Reviewed by: ZITA Connolly    Past Medical History:  Past Medical History:   Diagnosis Date   • Contraception    • Dyspareunia    • Hypertension    • Insulin resistance    • Intractable migraine without aura and without status migrainosus 2021   • Intractable migraine without aura and without status migrainosus 2021   • IUD check up    • Ovarian cyst    • PCOS (polycystic ovarian syndrome)    • Trigeminal neuralgia    • Vitamin D deficiency      Past Surgical History:   Procedure Laterality Date   •  SECTION N/A 1/10/2018    Procedure:  SECTION PRIMARY;  Surgeon: Soham Osorio MD;  Location: [facility] LABOR DELIVERY;  Service:    • TONSILLECTOMY AND ADENOIDECTOMY      hemorrhaged with surgical repair 3 days after surgery   • WISDOM TOOTH EXTRACTION       Family History   Problem Relation Age of Onset   • No Known Problems Mother    • No Known Problems Father    • No Known Problems Maternal  "Grandmother    • No Known Problems Maternal Grandfather    • Leukemia Paternal Grandmother    • No Known Problems Paternal Grandfather      Social History     Tobacco Use   • Smoking status: Never Smoker   • Smokeless tobacco: Never Used   Vaping Use   • Vaping Use: Never used   Substance Use Topics   • Alcohol use: Yes     Comment: occasional   • Drug use: No     Review of Systems   HENT: Negative for facial swelling.    Neurological: Negative for facial asymmetry, speech difficulty and weakness.   All other systems reviewed and are negative.        Objective   Vital Signs:  Heart Rate:  [74] 74  Resp:  [17] 17  BP: (116)/(70) 116/70      09/07/22  1418   Weight: 111 kg (245 lb)     165.1 cm (65\")  Body mass index is 40.77 kg/m².    Physical Exam  Vitals reviewed.   Constitutional:       Appearance: Normal appearance.   HENT:      Head: Normocephalic.   Eyes:      Extraocular Movements: Extraocular movements intact.      Pupils: Pupils are equal, round, and reactive to light.   Cardiovascular:      Rate and Rhythm: Normal rate and regular rhythm.      Pulses: Normal pulses.   Pulmonary:      Effort: Pulmonary effort is normal.   Musculoskeletal:         General: Normal range of motion.      Cervical back: Normal range of motion and neck supple.   Skin:     General: Skin is warm and dry.      Capillary Refill: Capillary refill takes less than 2 seconds.   Neurological:      Gait: Gait is intact.   Psychiatric:         Mood and Affect: Mood normal.       Neurologic Exam:  Mental Status:    -Awake. Alert. Oriented to person, place & time.  -No word finding difficulties.  -No aphasia.  -No dysarthria.  -Follows simple commands.     CN II:  Full visual fields with confrontation.  Pupils equally reactive to light.  CN III, IV, VI:  Extraocular muscles function intact with no nystagmus.  CN V:  Facial sensory is symmetric.  CN VII:  Facial motor symmetric.  CN VIII:  Gross hearing intact bilaterally.  CN IX/X:  Palate " elevates symmetrically.  CN XI:  Shoulder shrug symmetric.  CN XII:  Tongue is midline on protrusion.     Motor: (strength out of 5:  1= minimal movement, 2 = movement in plane of gravity, 3 = movement against gravity, 4 = movement against some resistance, 5 = full strength)     -5/5 in bilateral biceps, triceps, brachioradialis, wrist extensors and intrinsic muscles of the hand.    -5/5 in bilateral hip flexors, quadriceps, hamstrings, gastrocsoleus complex, anterior tibialis and extensor hallucis longus.       Deep Tendon Reflexes:  -Right              Biceps: 2+         Triceps: 2+      Brachioradialis: 2+              Patella: 2+       Ankle: 2+         Babinski:  negative  -Left              Biceps: 2+         Triceps: 2+      Brachioradialis: 2+              Patella: 2+       Ankle: 2+         Babinski:  negative    Tone (Modified Ara Scale):  No appreciable increase in tone or rigidity noted.     Sensory:  -Intact to light touch, pinprick BUE (C5-T1) and BLE (L2-S1).     Coordination:  -Finger to nose intact BUEs  -Heel to shin intact BLEs  -No ataxia     Gait  -No signs of ataxia  -ambulates unassisted     Results Review:    Lab Results   Component Value Date    GLUCOSE 133 (H) 01/18/2020    BUN 10 06/09/2022    CREATININE 0.87 06/09/2022    EGFRIFNONA >60 06/09/2022    EGFRIFAFRI >60 06/09/2022    BCR 11 06/09/2022    K 4.2 06/09/2022    CO2 24 06/09/2022    CALCIUM 9.8 06/09/2022    PROTENTOTREF 7.0 06/20/2019    ALBUMIN 4.8 01/18/2020    LABIL2 2.2 06/20/2019    AST 32 01/18/2020    ALT 45 (H) 01/18/2020     Lab Results   Component Value Date    WBC 7.05 06/09/2022    HGB 14.8 06/09/2022    HCT 42.1 06/09/2022    MCV 90 06/09/2022     06/09/2022     Lab Results   Component Value Date    CHLPL 191 04/03/2017    TRIG 76 04/03/2017    HDL 46 (L) 04/03/2017     (H) 04/03/2017     Lab Results   Component Value Date    TSH 2.070 04/08/2021     Lab Results   Component Value Date    HGBA1C 4.42  (L) 04/08/2021     No results found for: FOLATE  No results found for: [id number]     Plan .  Assessment:  Jaquelin Rivera is a 31 y.o. female who presents with trigeminal neuralgia.  She continues to do well and has decreased her medications significantly.  She has told me that due to her lack of pain she would forget to take her medications and simply just stopped.  She is taking the Trileptal 600mg once a day right now.  Otherwise she is not on any other [place] medications.  She is pain free and doing very well with regard to the results of her surgery.  We will wean her off of the trileptal.    Plan:  • Decrease trileptal to 300mg once a day for 1 week then discontinue  • OK to keep off of other medications  • Call me if facial pain increases or if any other issues arise.     The patient and I have discussed the plan of care and she is in full agreement at this time.     Follow-Up:  Return in about 3 months (around 12/7/2022) for Trigeminal Nueralgia.       Sourav Arguelles, ZITA  09/07/22  14:39 CDT

## 2022-11-07 ENCOUNTER — TELEPHONE (OUTPATIENT)
Dept: FAMILY MEDICINE CLINIC | Facility: CLINIC | Age: 32
End: 2022-11-07

## 2022-11-07 ENCOUNTER — OFFICE VISIT (OUTPATIENT)
Dept: FAMILY MEDICINE CLINIC | Facility: CLINIC | Age: 32
End: 2022-11-07

## 2022-11-07 VITALS
OXYGEN SATURATION: 99 % | HEART RATE: 86 BPM | TEMPERATURE: 98.4 F | RESPIRATION RATE: 14 BRPM | WEIGHT: 255 LBS | BODY MASS INDEX: 42.49 KG/M2 | HEIGHT: 65 IN

## 2022-11-07 DIAGNOSIS — R68.89 FLU-LIKE SYMPTOMS: Primary | ICD-10-CM

## 2022-11-07 DIAGNOSIS — J10.1 INFLUENZA A: ICD-10-CM

## 2022-11-07 LAB
EXPIRATION DATE: ABNORMAL
FLUAV AG NPH QL: POSITIVE
FLUBV AG NPH QL: NEGATIVE
INTERNAL CONTROL: ABNORMAL
Lab: ABNORMAL

## 2022-11-07 PROCEDURE — 87804 INFLUENZA ASSAY W/OPTIC: CPT | Performed by: NURSE PRACTITIONER

## 2022-11-07 PROCEDURE — 99213 OFFICE O/P EST LOW 20 MIN: CPT | Performed by: NURSE PRACTITIONER

## 2022-11-07 RX ORDER — OSELTAMIVIR PHOSPHATE 75 MG/1
75 CAPSULE ORAL 2 TIMES DAILY
Qty: 10 CAPSULE | Refills: 0 | Status: SHIPPED | OUTPATIENT
Start: 2022-11-07 | End: 2022-11-12

## 2022-11-07 NOTE — PROGRESS NOTES
Subjective   Chief Complaint:  Respiratory concern    History of Present Illness:  This 31 y.o. female was seen in the office today.  Outside drive-through clinic    Jaquelin Rivera is a 31-year-old female who presents today with cough, chest congestion, body aches, fatigue, headaches and nausea.     Allergies   Allergen Reactions   • Ibuprofen      MAKES THROAT BURN      Current Outpatient Medications on File Prior to Visit   Medication Sig   • LO LOESTRIN FE 1 MG-10 MCG / 10 MCG tablet TAKE 1 TABLET BY MOUTH ONCE DAILY   • OXcarbazepine (TRILEPTAL) 600 MG tablet TAKE 1 TABLET BY MOUTH THREE TIMES DAILY     No current facility-administered medications on file prior to visit.      Past Medical, Surgical, Social, and Family History:  Past Medical History:   Diagnosis Date   • Contraception    • Dyspareunia    • Hypertension    • Insulin resistance    • Intractable migraine without aura and without status migrainosus 2021   • Intractable migraine without aura and without status migrainosus 2021   • IUD check up    • Ovarian cyst    • PCOS (polycystic ovarian syndrome)    • Trigeminal neuralgia    • Vitamin D deficiency      Past Surgical History:   Procedure Laterality Date   •  SECTION N/A 1/10/2018    Procedure:  SECTION PRIMARY;  Surgeon: Soham Osorio MD;  Location: [facility] LABOR DELIVERY;  Service:    • TONSILLECTOMY AND ADENOIDECTOMY      hemorrhaged with surgical repair 3 days after surgery   • WISDOM TOOTH EXTRACTION       Social History     Socioeconomic History   • Marital status:    Tobacco Use   • Smoking status: Never   • Smokeless tobacco: Never   Vaping Use   • Vaping Use: Never used   Substance and Sexual Activity   • Alcohol use: Yes     Comment: occasional   • Drug use: No   • Sexual activity: Yes     Partners: Male     Birth control/protection: None, Pill     Family History   Problem Relation Age of Onset   • No Known Problems Mother    • No Known Problems Father  "   • No Known Problems Maternal Grandmother    • No Known Problems Maternal Grandfather    • Leukemia Paternal Grandmother    • No Known Problems Paternal Grandfather        Objective   Physical Exam  Constitutional:       General: She is not in acute distress.     Comments: Appears fatigued.    Pulmonary:      Effort: Pulmonary effort is normal. No respiratory distress.   Neurological:      Mental Status: She is alert.     Pulse 86   Temp 98.4 °F (36.9 °C)   Resp 14   Ht 165.1 cm (65\")   Wt 116 kg (255 lb)   SpO2 99%   BMI 42.43 kg/m²     Prior Visit Notes/Records, Lab, Imaging, and Diagnostic Results Reviewed:  CBC:  Lab Results - Last 18 Months   Lab Units 06/09/22  1102   WBC 10*3/uL 7.05   HEMOGLOBIN g/dL 14.8   HEMATOCRIT % 42.1   PLATELETS 10*3/uL 260      Chemistry:  Lab Results - Last 18 Months   Lab Units 06/09/22  1102   SODIUM mmol/L 141   POTASSIUM mmol/L 4.2   CHLORIDE mmol/L 107   TOTAL CO2 mmol/L 24   BUN mg/dL 10   CREATININE mg/dL 0.87   EGFR IF NONAFRICN AM mL/min/1.73m*2 >60   EGFR IF AFRICN AM mL/min/1.73m*2 >60   CALCIUM mg/dL 9.8     No results for input(s): ALT, AST, ALKPHOS, TOTAL in the last 15377 hours.    Invalid input(s): HEPATITSC    Assessment & Plan   Diagnoses and all orders for this visit:    1. Flu-like symptoms (Primary)  -     POC Influenza A / B    2. Influenza A    Other orders  -     oseltamivir (Tamiflu) 75 MG capsule; Take 1 capsule by mouth 2 (Two) Times a Day for 5 days.  Dispense: 10 capsule; Refill: 0    Discussion:  Advised and educated plan of care.      Advised patient point of care influenza test is positive, proceed with Tamiflu.    Follow-up:  Return if symptoms worsen or fail to improve.    Transcribed from ambient dictation for ZITA Crenshaw by Onesimo Shannon.  11/07/22   17:51 CST    I have personally performed the services described in this document as transcribed by the above individual, and it is both accurate and complete.    "

## 2022-12-02 ENCOUNTER — TELEPHONE (OUTPATIENT)
Dept: FAMILY MEDICINE CLINIC | Facility: CLINIC | Age: 32
End: 2022-12-02

## 2022-12-02 NOTE — TELEPHONE ENCOUNTER
Caller: Jaquelin Rivera    Relationship: Self    Best call back number: [phone]    What orders are you requesting (i.e. lab or imaging): HCG PANEL FOR PREGNANCY    In what timeframe would the patient need to come in: AS SOON AS POSSIBLE    Where will you receive your lab/imaging services: AT OFFICE    Additional notes: PATIENT HAS TAKEN 2 AT HOME PREGNANCY TEST'S AND WOULD LIKE TO CONFIRM FOR SURE

## 2022-12-05 ENCOUNTER — LAB (OUTPATIENT)
Dept: FAMILY MEDICINE CLINIC | Facility: CLINIC | Age: 32
End: 2022-12-05

## 2022-12-05 DIAGNOSIS — N92.6 MISSED PERIOD: ICD-10-CM

## 2022-12-05 DIAGNOSIS — F41.9 ANXIETY: ICD-10-CM

## 2022-12-05 DIAGNOSIS — Z00.00 WELLNESS EXAMINATION: ICD-10-CM

## 2022-12-05 DIAGNOSIS — G43.019 INTRACTABLE MIGRAINE WITHOUT AURA AND WITHOUT STATUS MIGRAINOSUS: ICD-10-CM

## 2022-12-05 DIAGNOSIS — G50.0 TRIGEMINAL NEURALGIA OF RIGHT SIDE OF FACE: ICD-10-CM

## 2022-12-05 DIAGNOSIS — E66.01 CLASS 3 SEVERE OBESITY WITHOUT SERIOUS COMORBIDITY WITH BODY MASS INDEX (BMI) OF 45.0 TO 49.9 IN ADULT, UNSPECIFIED OBESITY TYPE: Primary | ICD-10-CM

## 2022-12-06 LAB
ALBUMIN SERPL-MCNC: 4.3 G/DL (ref 3.5–5.2)
ALBUMIN/GLOB SERPL: 1.9 G/DL
ALP SERPL-CCNC: 57 U/L (ref 39–117)
ALT SERPL-CCNC: 22 U/L (ref 1–33)
AST SERPL-CCNC: 15 U/L (ref 1–32)
B-HCG SERPL QL: POSITIVE
BASOPHILS # BLD AUTO: 0.04 10*3/MM3 (ref 0–0.2)
BASOPHILS NFR BLD AUTO: 0.4 % (ref 0–1.5)
BILIRUB SERPL-MCNC: 0.3 MG/DL (ref 0–1.2)
BUN SERPL-MCNC: 10 MG/DL (ref 6–20)
BUN/CREAT SERPL: 10.5 (ref 7–25)
CALCIUM SERPL-MCNC: 9.7 MG/DL (ref 8.6–10.5)
CHLORIDE SERPL-SCNC: 105 MMOL/L (ref 98–107)
CHOLEST SERPL-MCNC: 158 MG/DL (ref 0–200)
CO2 SERPL-SCNC: 23 MMOL/L (ref 22–29)
CREAT SERPL-MCNC: 0.95 MG/DL (ref 0.57–1)
EGFRCR SERPLBLD CKD-EPI 2021: 81.8 ML/MIN/1.73
EOSINOPHIL # BLD AUTO: 0.21 10*3/MM3 (ref 0–0.4)
EOSINOPHIL NFR BLD AUTO: 2 % (ref 0.3–6.2)
ERYTHROCYTE [DISTWIDTH] IN BLOOD BY AUTOMATED COUNT: 13 % (ref 12.3–15.4)
GLOBULIN SER CALC-MCNC: 2.3 GM/DL
GLUCOSE SERPL-MCNC: 78 MG/DL (ref 65–99)
HCT VFR BLD AUTO: 41.1 % (ref 34–46.6)
HCV AB S/CO SERPL IA: 0.1 S/CO RATIO (ref 0–0.9)
HDLC SERPL-MCNC: 54 MG/DL (ref 40–60)
HGB BLD-MCNC: 14.3 G/DL (ref 12–15.9)
IMM GRANULOCYTES # BLD AUTO: 0.02 10*3/MM3 (ref 0–0.05)
IMM GRANULOCYTES NFR BLD AUTO: 0.2 % (ref 0–0.5)
LDLC SERPL CALC-MCNC: 82 MG/DL (ref 0–100)
LDLC/HDLC SERPL: 1.47 {RATIO}
LYMPHOCYTES # BLD AUTO: 3.31 10*3/MM3 (ref 0.7–3.1)
LYMPHOCYTES NFR BLD AUTO: 30.9 % (ref 19.6–45.3)
Lab: NORMAL
MCH RBC QN AUTO: 28.8 PG (ref 26.6–33)
MCHC RBC AUTO-ENTMCNC: 34.8 G/DL (ref 31.5–35.7)
MCV RBC AUTO: 82.9 FL (ref 79–97)
MONOCYTES # BLD AUTO: 0.6 10*3/MM3 (ref 0.1–0.9)
MONOCYTES NFR BLD AUTO: 5.6 % (ref 5–12)
NEUTROPHILS # BLD AUTO: 6.53 10*3/MM3 (ref 1.7–7)
NEUTROPHILS NFR BLD AUTO: 60.9 % (ref 42.7–76)
NRBC BLD AUTO-RTO: 0 /100 WBC (ref 0–0.2)
PLATELET # BLD AUTO: 304 10*3/MM3 (ref 140–450)
POTASSIUM SERPL-SCNC: 3.7 MMOL/L (ref 3.5–5.2)
PROT SERPL-MCNC: 6.6 G/DL (ref 6–8.5)
RBC # BLD AUTO: 4.96 10*6/MM3 (ref 3.77–5.28)
SODIUM SERPL-SCNC: 137 MMOL/L (ref 136–145)
TRIGL SERPL-MCNC: 124 MG/DL (ref 0–150)
TSH SERPL DL<=0.005 MIU/L-ACNC: 2.48 UIU/ML (ref 0.27–4.2)
VLDLC SERPL CALC-MCNC: 22 MG/DL (ref 5–40)
WBC # BLD AUTO: 10.71 10*3/MM3 (ref 3.4–10.8)
WRITTEN AUTHORIZATION: NORMAL

## 2022-12-14 ENCOUNTER — TELEPHONE (OUTPATIENT)
Dept: FAMILY MEDICINE CLINIC | Facility: CLINIC | Age: 32
End: 2022-12-14

## 2022-12-14 ENCOUNTER — TELEPHONE (OUTPATIENT)
Dept: OBGYN CLINIC | Age: 32
End: 2022-12-14

## 2022-12-14 NOTE — TELEPHONE ENCOUNTER
Pt returned Tapan's call, unable to reach through Anant Sexton. Please contact pt when able.   Thank you

## 2022-12-14 NOTE — TELEPHONE ENCOUNTER
Pt returned call. Please advise.  Pt requested to return call after 3pm today if can't call back before 12:10 pm.

## 2022-12-14 NOTE — TELEPHONE ENCOUNTER
Caller: Jaquelin Rivera    Relationship: Self    Best call back number: [phone]    What form or medical record are you requesting: LAB RESULTS FROM 12.04.2022    Who is requesting this form or medical record from you: PATIENTS OBGYN    How would you like to receive the form or medical records (pick-up, mail, fax): FAX  If fax, what is the fax number: [phone]    Timeframe paperwork needed: AS SOON AS POSSIBLE

## 2022-12-16 ENCOUNTER — TELEPHONE (OUTPATIENT)
Dept: FAMILY MEDICINE CLINIC | Facility: CLINIC | Age: 32
End: 2022-12-16

## 2022-12-19 ENCOUNTER — OFFICE VISIT (OUTPATIENT)
Dept: NEUROLOGY | Facility: CLINIC | Age: 32
End: 2022-12-19

## 2022-12-19 VITALS
BODY MASS INDEX: 44.98 KG/M2 | HEART RATE: 100 BPM | DIASTOLIC BLOOD PRESSURE: 65 MMHG | SYSTOLIC BLOOD PRESSURE: 129 MMHG | OXYGEN SATURATION: 98 % | WEIGHT: 270 LBS | HEIGHT: 65 IN

## 2022-12-19 DIAGNOSIS — G50.0 TRIGEMINAL NEURALGIA OF RIGHT SIDE OF FACE: Primary | ICD-10-CM

## 2022-12-19 PROCEDURE — 99213 OFFICE O/P EST LOW 20 MIN: CPT | Performed by: NURSE PRACTITIONER

## 2022-12-20 ENCOUNTER — OFFICE VISIT (OUTPATIENT)
Dept: FAMILY MEDICINE CLINIC | Facility: CLINIC | Age: 32
End: 2022-12-20

## 2022-12-20 VITALS
HEIGHT: 65 IN | OXYGEN SATURATION: 99 % | DIASTOLIC BLOOD PRESSURE: 82 MMHG | SYSTOLIC BLOOD PRESSURE: 124 MMHG | RESPIRATION RATE: 18 BRPM | HEART RATE: 93 BPM | TEMPERATURE: 97.3 F | BODY MASS INDEX: 45.12 KG/M2 | WEIGHT: 270.8 LBS

## 2022-12-20 DIAGNOSIS — J40 BRONCHITIS: Primary | ICD-10-CM

## 2022-12-20 PROCEDURE — 99213 OFFICE O/P EST LOW 20 MIN: CPT | Performed by: NURSE PRACTITIONER

## 2022-12-20 RX ORDER — AMOXICILLIN 500 MG/1
500 TABLET, FILM COATED ORAL 3 TIMES DAILY
Qty: 30 TABLET | Refills: 0 | Status: SHIPPED | OUTPATIENT
Start: 2022-12-20 | End: 2022-12-30

## 2022-12-20 NOTE — PROGRESS NOTES
Subjective   Chief Complaint:  Cough and congestion    History of Present Illness:  This 32 y.o. female was seen in the office today.  She reports persistent cough and respiratory congestion despite using Mucinex and Delsym products since having influenza over a month ago.  Reports she is pregnant.    Allergies   Allergen Reactions   • Ibuprofen      MAKES THROAT BURN      Current Outpatient Medications on File Prior to Visit   Medication Sig   • Prenatal Multivit-Min-Fe-FA (PRE- PO) Take  by mouth.     No current facility-administered medications on file prior to visit.      Past Medical, Surgical, Social, and Family History:  Past Medical History:   Diagnosis Date   • Contraception    • Dyspareunia    • Hypertension    • Insulin resistance    • Intractable migraine without aura and without status migrainosus 2021   • Intractable migraine without aura and without status migrainosus 2021   • IUD check up    • Ovarian cyst    • PCOS (polycystic ovarian syndrome)    • Trigeminal neuralgia    • Vitamin D deficiency      Past Surgical History:   Procedure Laterality Date   •  SECTION N/A 1/10/2018    Procedure:  SECTION PRIMARY;  Surgeon: Soham Osorio MD;  Location: [facility] LABOR DELIVERY;  Service:    • TONSILLECTOMY AND ADENOIDECTOMY      hemorrhaged with surgical repair 3 days after surgery   • WISDOM TOOTH EXTRACTION       Social History     Socioeconomic History   • Marital status:    Tobacco Use   • Smoking status: Never   • Smokeless tobacco: Never   Vaping Use   • Vaping Use: Never used   Substance and Sexual Activity   • Alcohol use: Yes     Comment: occasional   • Drug use: No   • Sexual activity: Yes     Partners: Male     Birth control/protection: None, Pill     Family History   Problem Relation Age of Onset   • No Known Problems Mother    • No Known Problems Father    • No Known Problems Maternal Grandmother    • No Known Problems Maternal Grandfather    • Leukemia  "Paternal Grandmother    • No Known Problems Paternal Grandfather        Prior Visit Notes/Records, Lab, Imaging, and Diagnostic Results Reviewed:  A1C:No results for input(s): HGBA1C in the last 72600 hours.  GLUCOSE:  Lab Results - Last 18 Months   Lab Units 12/04/22  2300   GLUCOSE mg/dL 78     LIPID:  Lab Results - Last 18 Months   Lab Units 12/04/22  2300   CHOLESTEROL mg/dL 158   LDL CHOL mg/dL 82   HDL CHOL mg/dL 54   TRIGLYCERIDES mg/dL 124     PSA:No results for input(s): PSA in the last 93393 hours.  CBC:  Lab Results - Last 18 Months   Lab Units 12/04/22  2300 06/09/22  1102   WBC 10*3/mm3 10.71 7.05   HEMOGLOBIN g/dL 14.3 14.8   HEMATOCRIT % 41.1 42.1   PLATELETS 10*3/mm3 304 260      BMP/CMP:  Lab Results - Last 18 Months   Lab Units 12/04/22  2300 06/09/22  1102   SODIUM mmol/L 137 141   POTASSIUM mmol/L 3.7 4.2   CHLORIDE mmol/L 105 107   TOTAL CO2 mmol/L 23.0 24   GLUCOSE mg/dL 78  --    BUN mg/dL 10 10   CREATININE mg/dL 0.95 0.87   EGFR IF NONAFRICN AM mL/min/1.73m*2  --  >60   EGFR IF AFRICN AM mL/min/1.73m*2  --  >60   EGFR RESULT mL/min/1.73 81.8  --    CALCIUM mg/dL 9.7 9.8     HEPATIC:  Lab Results - Last 18 Months   Lab Units 12/04/22  2300   ALT (SGPT) U/L 22   AST (SGOT) U/L 15   ALK PHOS U/L 57     Vit D:No results for input(s): ELCR77LJ in the last 68235 hours.  THYROID:  Lab Results - Last 18 Months   Lab Units 12/04/22  2300   TSH uIU/mL 2.480       Objective   Physical Exam  Vitals reviewed.   Constitutional:       General: She is not in acute distress.     Appearance: Normal appearance.   Cardiovascular:      Rate and Rhythm: Normal rate and regular rhythm.   Pulmonary:      Effort: Pulmonary effort is normal.      Breath sounds: Rhonchi (*biLateral bronchial*) present.     /82 (BP Location: Left arm, Patient Position: Sitting, Cuff Size: Adult)   Pulse 93   Temp 97.3 °F (36.3 °C) (Infrared)   Resp 18   Ht 165.1 cm (65\")   Wt 123 kg (270 lb 12.8 oz)   SpO2 99%   BMI 45.06 " kg/m²     Assessment & Plan   Diagnoses and all orders for this visit:    1. Bronchitis (Primary)    Other orders  -     amoxicillin (AMOXIL) 500 MG tablet; Take 1 tablet by mouth 3 (Three) Times a Day for 10 days.  Dispense: 30 tablet; Refill: 0    Discussion:  Advised and educated plan of care.      Follow-up:  Return if symptoms worsen or fail to improve.    Electronically signed by ZITA Crenshaw, 12/20/22, 12:54 PM CST.

## 2022-12-27 NOTE — PROGRESS NOTES
Neurology Progress Note      Chief Complaint:    Trigeminal Neuralgia    Subjective   History of Present Illness:  Jaquelin Rivera is a 32 y.o. female who presents today for trigeminal neuralgia.  She is routinely followed by Josef Ramsey MD for primary care.     Trigeminal Neuralgia  She continues to be pain free.  She denies any issues with her facial pain.  She has now stopped her Trileptal as well.  She continues without any new concerns.  She reports that she is pregnant and does mention that she has some concerns that her pain will return as she had this occur with her previous pregnancy.      Allergies:    Ibuprofen    Medications:  Current Outpatient Medications   Medication Sig Dispense Refill   • Prenatal Multivit-Min-Fe-FA (PRE-DELBERT PO) Take  by mouth.     • amoxicillin (AMOXIL) 500 MG tablet Take 1 tablet by mouth 3 (Three) Times a Day for 10 days. 30 tablet 0     No current facility-administered medications for this visit.     Current outpatient and discharge medications have been reconciled for the patient.  Reviewed by: ZITA Connolly    Past Medical History:  Past Medical History:   Diagnosis Date   • Contraception    • Dyspareunia    • Hypertension    • Insulin resistance    • Intractable migraine without aura and without status migrainosus 2021   • Intractable migraine without aura and without status migrainosus 2021   • IUD check up    • Ovarian cyst    • PCOS (polycystic ovarian syndrome)    • Trigeminal neuralgia    • Vitamin D deficiency      Past Surgical History:   Procedure Laterality Date   •  SECTION N/A 1/10/2018    Procedure:  SECTION PRIMARY;  Surgeon: Soham Osorio MD;  Location: [facility] LABOR DELIVERY;  Service:    • TONSILLECTOMY AND ADENOIDECTOMY      hemorrhaged with surgical repair 3 days after surgery   • WISDOM TOOTH EXTRACTION       Family History   Problem Relation Age of Onset   • No Known Problems Mother    • No Known  "Problems Father    • No Known Problems Maternal Grandmother    • No Known Problems Maternal Grandfather    • Leukemia Paternal Grandmother    • No Known Problems Paternal Grandfather      Social History     Tobacco Use   • Smoking status: Never   • Smokeless tobacco: Never   Vaping Use   • Vaping Use: Never used   Substance Use Topics   • Alcohol use: Yes     Comment: occasional   • Drug use: No     Review of Systems   HENT: Negative for facial swelling.    Neurological: Negative for facial asymmetry, speech difficulty and weakness.   All other systems reviewed and are negative.        Objective   Vital Signs:         12/19/22  1444   Weight: 122 kg (270 lb)     165.1 cm (65\")  Body mass index is 44.93 kg/m².    Physical Exam  Vitals reviewed.   Constitutional:       Appearance: Normal appearance.   HENT:      Head: Normocephalic.   Eyes:      Extraocular Movements: Extraocular movements intact.      Pupils: Pupils are equal, round, and reactive to light.   Cardiovascular:      Rate and Rhythm: Normal rate and regular rhythm.      Pulses: Normal pulses.   Pulmonary:      Effort: Pulmonary effort is normal.   Musculoskeletal:         General: Normal range of motion.      Cervical back: Normal range of motion and neck supple.   Skin:     General: Skin is warm and dry.      Capillary Refill: Capillary refill takes less than 2 seconds.   Neurological:      Gait: Gait is intact.   Psychiatric:         Mood and Affect: Mood normal.       Neurologic Exam:  Mental Status:    -Awake. Alert. Oriented to person, place & time.  -No word finding difficulties.  -No aphasia.  -No dysarthria.  -Follows simple commands.     CN II:  Full visual fields with confrontation.  Pupils equally reactive to light.  CN III, IV, VI:  Extraocular muscles function intact with no nystagmus.  CN V:  Facial sensory is symmetric.  CN VII:  Facial motor symmetric.  CN VIII:  Gross hearing intact bilaterally.  CN IX/X:  Palate elevates " symmetrically.  CN XI:  Shoulder shrug symmetric.  CN XII:  Tongue is midline on protrusion.     Motor: (strength out of 5:  1= minimal movement, 2 = movement in plane of gravity, 3 = movement against gravity, 4 = movement against some resistance, 5 = full strength)     -5/5 in bilateral biceps, triceps, brachioradialis, wrist extensors and intrinsic muscles of the hand.    -5/5 in bilateral hip flexors, quadriceps, hamstrings, gastrocsoleus complex, anterior tibialis and extensor hallucis longus.       Deep Tendon Reflexes:  -Right              Biceps: 2+         Triceps: 2+      Brachioradialis: 2+              Patella: 2+       Ankle: 2+         Babinski:  negative  -Left              Biceps: 2+         Triceps: 2+      Brachioradialis: 2+              Patella: 2+       Ankle: 2+         Babinski:  negative    Tone (Modified Ara Scale):  No appreciable increase in tone or rigidity noted.     Sensory:  -Intact to light touch, pinprick BUE (C5-T1) and BLE (L2-S1).     Coordination:  -Finger to nose intact BUEs  -Heel to shin intact BLEs  -No ataxia     Gait  -No signs of ataxia  -ambulates unassisted     Results Review:    Lab Results   Component Value Date    GLUCOSE 78 12/04/2022    BUN 10 12/04/2022    CREATININE 0.95 12/04/2022    EGFRIFNONA >60 06/09/2022    EGFRIFAFRI >60 06/09/2022    BCR 10.5 12/04/2022    K 3.7 12/04/2022    CO2 23.0 12/04/2022    CALCIUM 9.7 12/04/2022    PROTENTOTREF 6.6 12/04/2022    ALBUMIN 4.30 12/04/2022    LABIL2 1.9 12/04/2022    AST 15 12/04/2022    ALT 22 12/04/2022     Lab Results   Component Value Date    WBC 10.71 12/04/2022    HGB 14.3 12/04/2022    HCT 41.1 12/04/2022    MCV 82.9 12/04/2022     12/04/2022     Lab Results   Component Value Date    CHLPL 158 12/04/2022    TRIG 124 12/04/2022    HDL 54 12/04/2022    LDL 82 12/04/2022     Lab Results   Component Value Date    TSH 2.480 12/04/2022     Lab Results   Component Value Date    HGBA1C 4.42 (L) 04/08/2021      No results found for: FOLATE  No results found for: [id number]     Plan .  Assessment:  Jaquelin Rivera is a 32 y.o. female who presents with trigeminal neuralgia.  She continues to do well and is now on no medications for treatment of her trigeminal neuralgia.  She reports that she continues to be very happy.  We will continue to follow her for her concerns of recurrence with her pregnancy but I feel this is on a low risk for return of symptoms due to her pregnancy alone.  I have explained this to her and she states understanding. We will not add any changes at this time.     Plan:  • No need for addition of pain relieving agents.   • Call me if facial pain increases or if any other issues arise.     The patient and I have discussed the plan of care and she is in full agreement at this time.     Follow-Up:  Return in about 6 months (around 6/19/2023) for TN.       Sourav Arguelles, ZITA  12/27/22  00:24 CST

## 2023-01-09 ENCOUNTER — INITIAL PRENATAL (OUTPATIENT)
Dept: OBGYN CLINIC | Age: 33
End: 2023-01-09

## 2023-01-09 ENCOUNTER — HOSPITAL ENCOUNTER (OUTPATIENT)
Dept: ULTRASOUND IMAGING | Age: 33
Discharge: HOME | End: 2023-01-09
Payer: COMMERCIAL

## 2023-01-09 VITALS
BODY MASS INDEX: 44.26 KG/M2 | SYSTOLIC BLOOD PRESSURE: 136 MMHG | WEIGHT: 266 LBS | DIASTOLIC BLOOD PRESSURE: 83 MMHG | HEART RATE: 105 BPM

## 2023-01-09 DIAGNOSIS — Z3A.08 8 WEEKS GESTATION OF PREGNANCY: ICD-10-CM

## 2023-01-09 DIAGNOSIS — Z3A.08 8 WEEKS GESTATION OF PREGNANCY: Primary | ICD-10-CM

## 2023-01-09 DIAGNOSIS — Z12.4 SCREENING FOR CERVICAL CANCER: ICD-10-CM

## 2023-01-09 DIAGNOSIS — Z36.9 ENCOUNTER FOR ANTENATAL SCREENING: ICD-10-CM

## 2023-01-09 DIAGNOSIS — Z01.419 WELL WOMAN EXAM WITH ROUTINE GYNECOLOGICAL EXAM: ICD-10-CM

## 2023-01-09 DIAGNOSIS — Z36.89 CONFIRM FETAL CARDIAC ACTIVITY USING ULTRASOUND: ICD-10-CM

## 2023-01-09 DIAGNOSIS — Z76.89 ENCOUNTER TO ESTABLISH CARE: ICD-10-CM

## 2023-01-09 DIAGNOSIS — Z11.51 SCREENING FOR HPV (HUMAN PAPILLOMAVIRUS): ICD-10-CM

## 2023-01-09 DIAGNOSIS — O34.219 HISTORY OF CESAREAN DELIVERY, CURRENTLY PREGNANT: ICD-10-CM

## 2023-01-09 LAB
ABO/RH: NORMAL
ANTIBODY SCREEN: NORMAL
GONADOTROPIN, CHORIONIC (HCG) QUANT: ABNORMAL MIU/ML (ref 0–5.3)
HEPATITIS C ANTIBODY INTERPRETATION: NORMAL
PROGESTERONE LEVEL: 17.55 NG/ML

## 2023-01-09 PROCEDURE — 76817 TRANSVAGINAL US OBSTETRIC: CPT | Performed by: RADIOLOGY

## 2023-01-09 PROCEDURE — 76817 TRANSVAGINAL US OBSTETRIC: CPT

## 2023-01-09 NOTE — PROGRESS NOTES
Patient presents today for initial ob visit. Pt denies any vaginal leaking bleeding or contractions. Heavenly Herrera is here for a new obstetrical visit, new pt visit. . Today she is 8w3d weeks EGA. She is doing well and has no complaints. Due for a physical. Had c/section with her almost 11year old d/t issue with trigeminal neuralgia. She has since had surgery and has been pain free. I have requested her to get a letter from her neuro or neurosurgeon to ensure ok to have  as this is pt's wishes. Had US at [facility] and they think a flicker for heart beat was seen week of xmas. She  does not have vaginal bleeding, leaking of fluid, contractions. She does not have blurred vision, SOB, or increased swelling in legs or face. Pt does not feel fetal movement regularly. O:   See prenatal physical  Vitals:    23 1313   BP: 136/83   Pulse: (!) 105   Weight: 266 lb (120.7 kg)     Pt is A&Ox3, in no acute distress. Normocephalic, atraumatic. PERRL. Resp even and non-labored. Skin pink, warm & dry. Gravid abdomen. PAL's well. Gait steady. See OB flowsheet. A: Normal IUP at 8w3d wks      Diagnosis Orders   1. 8 weeks gestation of pregnancy  HCG, Quantitative, Pregnancy    Progesterone      2. Confirm fetal cardiac activity using ultrasound  US OB TRANSVAGINAL      3. History of  delivery, currently pregnant        4. Encounter to establish care        5. Encounter for  screening  Varicella Zoster Antibody, IgG    HIV Obstetric Panel    Culture, Urine    C.trachomatis, N.gonorrhoeae, T.vaginalis Molecular, Thin Prep    Hepatitis C Antibody      6. Well woman exam with routine gynecological exam  PAP SMEAR    Human papillomavirus (HPV) DNA probe thin prep high risk      7. Screening for cervical cancer  PAP SMEAR      8.  Screening for HPV (human papillomavirus)  Human papillomavirus (HPV) DNA probe thin prep high risk          P:   Pt counseled on pregnancy recommendations,  and Genetic testing  Continue with routine prenatal care. RTC in 4 wk for prenatal visit w Melissa mayfield  MEDICATIONS:  No orders of the defined types were placed in this encounter.       ORDERS:  Orders Placed This Encounter   Procedures    Culture, Urine    C.trachomatis, N.gonorrhoeae, T.vaginalis Molecular, Thin Prep    US OB TRANSVAGINAL    PAP SMEAR    Human papillomavirus (HPV) DNA probe thin prep high risk    Varicella Zoster Antibody, IgG    HIV Obstetric Panel    Hepatitis C Antibody    HCG, Quantitative, Pregnancy    Progesterone

## 2023-01-09 NOTE — PATIENT INSTRUCTIONS
We are committed to providing you with the best care possible. In order to help us achieve these goals please remember to bring all medications, herbal products, and over the counter supplements with you to each visit. If your provider has ordered testing for you, please be sure to follow up with our office if you have not received results within 7 days after testing took place. *If you receive a survey after visiting one of our offices, please take the time to share your experience concerning your physician office visit. These surveys are confidential and no health information about you is shared. We are eager to improve for you and we are counting on your feedback to help make that happen. Patient Education        Weeks 6 to 10 of Your Pregnancy: Care Instructions  Your baby is growing very quickly. You may start to feel different, both in your body and your emotions. Each pregnancy is different, so there's no \"right\" way to feel. These early weeks are a time to make healthy choices for you and your baby. Take a daily prenatal vitamin. Choose one with folic acid in it. Avoid alcohol, tobacco, and drugs (including marijuana). They can harm your baby. Drink plenty of liquids. Be sure to drink enough water. And limit sodas, other sweetened drinks, and caffeine. Choose foods that are good sources of calcium, iron, and folate. You can try dark leafy greens, fortified orange juice and cereals, almonds, broccoli, dried fruit, and beans. Avoid foods that may harm your baby. Don't eat raw meat, deli meat, raw seafood, or raw eggs. Avoid soft cheese and unpasteurized dairy, like Brie and blue cheese. And don't eat fish that contains a lot of mercury, like shark and swordfish. Don't touch ermelinda litter or cat poop. They can cause an infection that could harm your baby. Avoid things that can make your body too hot. For example, avoid hot tubs and saunas. Soothe morning sickness.   Try eating 5 or 6 small meals a day, getting some fresh air, or using mary to control symptoms. Follow-up care is a key part of your treatment and safety. Be sure to make and go to all appointments, and call your doctor if you are having problems. It's also a good idea to know your test results and keep a list of the medicines you take. Where can you learn more? Go to http://www.woods.com/ and enter G112 to learn more about \"Weeks 6 to 10 of Your Pregnancy: Care Instructions. \"  Current as of: February 23, 2022               Content Version: 13.5  © 1968-1787 Healthwise, Incorporated. Care instructions adapted under license by [facility] ([facility]). If you have questions about a medical condition or this instruction, always ask your healthcare professional. [address] any warranty or liability for your use of this information.

## 2023-01-10 LAB
C. TRACHOMATIS DNA ,URINE: NOT DETECTED
N. GONORRHOEAE DNA, URINE: NOT DETECTED
TRICHOMONAS VAGINALIS DNA, URINE: NOT DETECTED

## 2023-01-11 LAB — URINE CULTURE, ROUTINE: NORMAL

## 2023-01-12 LAB
BASOPHILS ABSOLUTE: 0.1 K/UL (ref 0–0.2)
BASOPHILS RELATIVE PERCENT: 0.5 % (ref 0–1)
EOSINOPHILS ABSOLUTE: 0.1 K/UL (ref 0–0.6)
EOSINOPHILS RELATIVE PERCENT: 1 % (ref 0–5)
HCT VFR BLD CALC: 41 % (ref 37–47)
HEMOGLOBIN: 14 G/DL (ref 12–16)
HEPATITIS B SURFACE ANTIGEN INTERPRETATION: ABNORMAL
HIV-1 P24 AG: ABNORMAL
IMMATURE GRANULOCYTES #: 0.2 K/UL
LYMPHOCYTES ABSOLUTE: 3.2 K/UL (ref 1.1–4.5)
LYMPHOCYTES RELATIVE PERCENT: 29.3 % (ref 20–40)
MCH RBC QN AUTO: 29.4 PG (ref 27–31)
MCHC RBC AUTO-ENTMCNC: 34.1 G/DL (ref 33–37)
MCV RBC AUTO: 86 FL (ref 81–99)
MONOCYTES ABSOLUTE: 0.8 K/UL (ref 0–0.9)
MONOCYTES RELATIVE PERCENT: 6.8 % (ref 0–10)
NEUTROPHILS ABSOLUTE: 6.7 K/UL (ref 1.5–7.5)
NEUTROPHILS RELATIVE PERCENT: 60.6 % (ref 50–65)
PDW BLD-RTO: 13.6 % (ref 11.5–14.5)
PLATELET # BLD: 333 K/UL (ref 130–400)
PMV BLD AUTO: 10.2 FL (ref 9.4–12.3)
RAPID HIV 1&2: ABNORMAL
RBC # BLD: 4.77 M/UL (ref 4.2–5.4)
RPR: ABNORMAL
RUBELLA ANTIBODY IGG: REACTIVE
WBC # BLD: 11.1 K/UL (ref 4.8–10.8)

## 2023-01-13 RX ORDER — PROGESTERONE 200 MG/1
200 CAPSULE ORAL NIGHTLY
Qty: 30 CAPSULE | Refills: 3 | Status: SHIPPED | OUTPATIENT
Start: 2023-01-13

## 2023-01-14 LAB
HPV COMMENT: NORMAL
HPV TYPE 16: NOT DETECTED
HPV TYPE 18: NOT DETECTED
HPVOH (OTHER TYPES): NOT DETECTED

## 2023-01-16 LAB — VZV IGG SER QL IA: 3.23

## 2023-01-16 RX ORDER — PROGESTERONE 200 MG/1
200 CAPSULE ORAL NIGHTLY
Qty: 30 CAPSULE | Refills: 3 | Status: SHIPPED | OUTPATIENT
Start: 2023-01-16

## 2023-02-06 ENCOUNTER — ROUTINE PRENATAL (OUTPATIENT)
Dept: OBGYN CLINIC | Age: 33
End: 2023-02-06

## 2023-02-06 VITALS
SYSTOLIC BLOOD PRESSURE: 136 MMHG | DIASTOLIC BLOOD PRESSURE: 87 MMHG | BODY MASS INDEX: 43.77 KG/M2 | WEIGHT: 263 LBS | HEART RATE: 111 BPM

## 2023-02-06 DIAGNOSIS — Z34.81 ENCOUNTER FOR SUPERVISION OF OTHER NORMAL PREGNANCY IN FIRST TRIMESTER: ICD-10-CM

## 2023-02-06 DIAGNOSIS — Z36.9 ANTENATAL SCREENING ENCOUNTER: ICD-10-CM

## 2023-02-06 DIAGNOSIS — O34.219 HISTORY OF CESAREAN DELIVERY, CURRENTLY PREGNANT: ICD-10-CM

## 2023-02-06 DIAGNOSIS — Z3A.12 12 WEEKS GESTATION OF PREGNANCY: Primary | ICD-10-CM

## 2023-02-06 DIAGNOSIS — O99.210 OBESITY AFFECTING PREGNANCY, ANTEPARTUM: ICD-10-CM

## 2023-02-06 PROCEDURE — 0502F SUBSEQUENT PRENATAL CARE: CPT | Performed by: NURSE PRACTITIONER

## 2023-02-06 NOTE — PROGRESS NOTES
Patient presents today for routine prenatal visit. Pt denies any vaginal leaking bleeding or contractions. Dora Cason is here for a return obstetrical visit. Today she is 12w3d weeks EGA. She is doing well and has no complaints. Feeling good. Wants to do Jacque testing today. She  does not have vaginal bleeding, leaking of fluid, contractions. She does not have blurred vision, SOB, or increased swelling in legs or face. Pt does not feel fetal movement regularly. O:   Vitals:    23 1535   BP: 136/87   Pulse: (!) 111   Weight: 263 lb (119.3 kg)     Pt is A&Ox3, in no acute distress. Normocephalic, atraumatic. PERRL. Resp even and non-labored. Skin pink, warm & dry. Gravid abdomen. PAL's well. Gait steady. See OB flowsheet. A: Normal IUP at 12w3d wks      Diagnosis Orders   1. 12 weeks gestation of pregnancy        2. History of  delivery, currently pregnant        3.  screening encounter  Panorama Prenatal Test: Chromosomes 13, 18, 21, X & Y: Triploidy 22Q.11.2 Deletion    Horizon 14 (Pan-Ethnic Standard)      4. Obesity affecting pregnancy, antepartum        5. Encounter for supervision of other normal pregnancy in first trimester            P:   Pt counseled on Genetic testing  Continue with routine prenatal care. RTC in 4 wk for prenatal visit    MEDICATIONS:  No orders of the defined types were placed in this encounter.       ORDERS:  Orders Placed This Encounter   Procedures    Panorama Prenatal Test: Chromosomes 13, 18, 21, X & Y: Triploidy 22Q.11.2 Deletion    Horizon 15 (Pan-Ethnic Standard)

## 2023-02-06 NOTE — PATIENT INSTRUCTIONS
Patient Education        Weeks 10 to 14 of Your Pregnancy: Care Instructions  It's now possible to hear your baby's heartbeat with a special ultrasound device. Your baby's organs are developing. And your baby's arms and legs can bend. Decide about tests to check for birth defects. Think about your age, your chance of passing on a family disease, your need to know about any problems, and what you might do after you have the test results. It's okay to exercise. Try walking or swimming. Check with your doctor before starting a new program.     Carlos Gill may feel more tired than usual.  Taking naps during the day may help. You may feel emotional.  It might help to talk to someone. You may have headaches. Try lying down and putting a cool cloth over your forehead. You can use acetaminophen (Tylenol) for pain relief. Don't take any anti-inflammatory medicines (such as Advil, Motrin, Aleve), unless your doctor says it's okay. You may feel a fullness or aching in your lower belly. This can feel like the kind of cramps you might get before a period. A back rub may help. You may need to urinate more. Your growing uterus and changing hormones can affect your bladder. You may feel sick to your stomach (morning sickness). Try avoiding food and smells that make you feel sick. Your breasts may feel different. They may feel tender or get bigger. Your nipples may get darker. Try a new bra that gives you good support. Avoid things that could harm your baby. This includes alcohol, tobacco, and drugs (including marijuana). Take a daily prenatal vitamin. Choose one with folic acid. Follow-up care is a key part of your treatment and safety. Be sure to make and go to all appointments, and call your doctor if you are having problems. It's also a good idea to know your test results and keep a list of the medicines you take. Where can you learn more?   Go to http://www.woods.com/ and enter E090 to learn more about \"Weeks 10 to 14 of Your Pregnancy: Care Instructions. \"  Current as of: February 23, 2022               Content Version: 13.5  © 5393-2593 Healthwise, Incorporated. Care instructions adapted under license by [facility] ([facility]). If you have questions about a medical condition or this instruction, always ask your healthcare professional. [address] any warranty or liability for your use of this information.

## 2023-02-16 DIAGNOSIS — Z36.9 ANTENATAL SCREENING ENCOUNTER: ICD-10-CM

## 2023-02-27 DIAGNOSIS — Z34.81 ENCOUNTER FOR SUPERVISION OF OTHER NORMAL PREGNANCY IN FIRST TRIMESTER: Primary | ICD-10-CM

## 2023-02-27 DIAGNOSIS — Z34.81 ENCOUNTER FOR SUPERVISION OF OTHER NORMAL PREGNANCY IN FIRST TRIMESTER: ICD-10-CM

## 2023-03-07 ENCOUNTER — ROUTINE PRENATAL (OUTPATIENT)
Dept: OBGYN CLINIC | Age: 33
End: 2023-03-07

## 2023-03-07 VITALS
BODY MASS INDEX: 43.77 KG/M2 | DIASTOLIC BLOOD PRESSURE: 86 MMHG | HEART RATE: 84 BPM | WEIGHT: 263 LBS | SYSTOLIC BLOOD PRESSURE: 131 MMHG

## 2023-03-07 DIAGNOSIS — O34.219 HISTORY OF CESAREAN DELIVERY, CURRENTLY PREGNANT: ICD-10-CM

## 2023-03-07 DIAGNOSIS — Z3A.16 16 WEEKS GESTATION OF PREGNANCY: Primary | ICD-10-CM

## 2023-03-07 DIAGNOSIS — O99.210 OBESITY AFFECTING PREGNANCY, ANTEPARTUM: ICD-10-CM

## 2023-03-07 DIAGNOSIS — Z34.82 ENCOUNTER FOR SUPERVISION OF OTHER NORMAL PREGNANCY IN SECOND TRIMESTER: ICD-10-CM

## 2023-03-07 PROCEDURE — 0502F SUBSEQUENT PRENATAL CARE: CPT | Performed by: NURSE PRACTITIONER

## 2023-03-07 NOTE — PROGRESS NOTES
Patient presents today for routine prenatal visit. Pt denies any vaginal leaking bleeding or contractions. + Fetal movement.
Patient presents today for routine prenatal visit. Pt denies any vaginal leaking bleeding or contractions. Germania Hopkins is here for a return obstetrical visit. Today she is 16w4d weeks EGA. She is doing well and has no complaints. Setting up anatomy in about 4 weeks. ffdna non diagnostic x 2  For low fetal fraction. Unaware of gender still d/t position. She  does not have vaginal bleeding, leaking of fluid, contractions. She does not have blurred vision, SOB, or increased swelling in legs or face. Pt does not feel fetal movement regularly. O:   Vitals:    23 1614   BP: 131/86   Pulse: 84   Weight: 263 lb (119.3 kg)     Pt is A&Ox3, in no acute distress. Normocephalic, atraumatic. PERRL. Resp even and non-labored. Skin pink, warm & dry. Gravid abdomen. PAL's well. Gait steady. See OB flowsheet. A: Normal IUP at 16w4d wks      Diagnosis Orders   1. 16 weeks gestation of pregnancy  External Referral To Maternal Fetal Medicine      2. History of  delivery, currently pregnant        3. Obesity affecting pregnancy, antepartum        4. Encounter for supervision of other normal pregnancy in second trimester            P:   Pt counseled on PIH precautions  Continue with routine prenatal care. RTC in 4 wk for prenatal visit    MEDICATIONS:  No orders of the defined types were placed in this encounter.       ORDERS:  Orders Placed This Encounter   Procedures    External Referral To Maternal Fetal Medicine
all other ROS negative except as per HPI

## 2023-03-07 NOTE — PATIENT INSTRUCTIONS
Patient Education        Weeks 14 to 18 of Your Pregnancy: Care Instructions  Around this time, you may start to look pregnant. Your baby is now able to pass urine. And the first stool (meconium) is starting to collect in your baby's intestines. Hair is starting to grow on your baby's head. You may notice some skin changes, such as itchy spots on your palms or acne on your face. At your next doctor visit, you may have an ultrasound. So you might think about whether you want to know the sex of your baby. Also ask your doctor about flu and COVID-19 shots. How to reduce stress   Ask for help when you need it. Try to avoid things that cause you stress. Seek out things that relieve stress, such as a warm bath or yoga. How to get exercise   If you don't usually exercise, start slowly with short walks. Try to be active 30 minutes a day, at least 5 days a week. Avoid activities where you're more likely to fall. Use light weights to reduce stress on your joints. How to stay at a healthy weight for you   Talk to your doctor or midwife about how much weight you should gain. It's generally best to gain:  About 28 to 40 pounds if you're underweight. About 25 to 35 pounds if you're at a healthy weight. About 15 to 25 pounds if you're overweight. About 11 to 20 pounds if you're very overweight (obese). Follow-up care is a key part of your treatment and safety. Be sure to make and go to all appointments, and call your doctor if you are having problems. It's also a good idea to know your test results and keep a list of the medicines you take. Where can you learn more? Go to http://www.woods.com/ and enter I453 to learn more about \"Weeks 14 to 18 of Your Pregnancy: Care Instructions. \"  Current as of: February 23, 2022               Content Version: 13.5  © 6693-8316 Healthwise, Incorporated. Care instructions adapted under license by [facility] [address]  If you have questions about a medical condition or this instruction, always ask your healthcare professional. [place] [address] any warranty or liability for your use of this information.

## 2023-03-09 LAB
Lab: ABNORMAL
Lab: POSITIVE
NTRA ALPHA-THALASSEMIA: NEGATIVE
NTRA BETA-HEMOGLOBINOPATHIES: NEGATIVE
NTRA CANAVAN DISEASE: NEGATIVE
NTRA CYSTIC FIBROSIS: NEGATIVE
NTRA DUCHENNE/BECKER MUSCULAR DYSTROPHY: NEGATIVE
NTRA FAMILIAL DYSAUTONOMIA: NEGATIVE
NTRA FRAGILE X SYNDROME: NEGATIVE
NTRA GALACTOSEMIA: NEGATIVE
NTRA GAUCHER DISEASE: NEGATIVE
NTRA MEDIUM CHAIN ACYL-COA DEHYDROGENASE DEFICIENCY: POSITIVE
NTRA POLYCYSTIC KIDNEY DISEASE, AUTOSOMAL RECESSIVE: NEGATIVE
NTRA SMITH-LEMLI-OPITZ SYNDROME: NEGATIVE
NTRA SPINAL MUSCULAR ATROPHY: NEGATIVE
NTRA TAY-SACHS DISEASE: NEGATIVE

## 2023-03-16 ENCOUNTER — OFFICE VISIT (OUTPATIENT)
Dept: FAMILY MEDICINE CLINIC | Facility: CLINIC | Age: 33
End: 2023-03-16
Payer: COMMERCIAL

## 2023-03-16 VITALS
WEIGHT: 268.4 LBS | RESPIRATION RATE: 18 BRPM | HEART RATE: 90 BPM | HEIGHT: 65 IN | SYSTOLIC BLOOD PRESSURE: 128 MMHG | OXYGEN SATURATION: 99 % | DIASTOLIC BLOOD PRESSURE: 82 MMHG | BODY MASS INDEX: 44.72 KG/M2 | TEMPERATURE: 97.1 F

## 2023-03-16 DIAGNOSIS — J01.90 ACUTE NON-RECURRENT SINUSITIS, UNSPECIFIED LOCATION: Primary | ICD-10-CM

## 2023-03-16 DIAGNOSIS — Z3A.18 18 WEEKS GESTATION OF PREGNANCY: ICD-10-CM

## 2023-03-16 PROBLEM — E66.9 OBESITY: Status: RESOLVED | Noted: 2020-09-17 | Resolved: 2023-03-16

## 2023-03-16 PROCEDURE — 99213 OFFICE O/P EST LOW 20 MIN: CPT | Performed by: NURSE PRACTITIONER

## 2023-03-16 RX ORDER — AMOXICILLIN 875 MG/1
875 TABLET, COATED ORAL 2 TIMES DAILY
Qty: 20 TABLET | Refills: 0 | Status: SHIPPED | OUTPATIENT
Start: 2023-03-16 | End: 2023-03-26

## 2023-03-16 NOTE — PROGRESS NOTES
Subjective   Chief Complaint:  Cough    History of Present Illness:  This 32 y.o. female was seen in the office today.    The patient reports she has been experiencing intermittent nasal congestion for approximately 3 weeks. She states she has tried Flonase, Zyrtec, and Mucinex, which have provided some relief. The patient adds she experiences significant drainage at night, which disrupts her sleep. She notes a productive cough with green or clear phlegm. She also notes a slight sore throat. 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    The patient reports she is 18 weeks pregnant. She mentions this morning, she had a student run full force into her stomach and was monitored for it. She states her cervix and placenta were good, and she was advised to rest for a few days. The patient reports her baby's heart rate was 142 bpm, which is lower than her normal heart rate of upper 150's. She denies any bleeding. She would like a note excusing her from work tomorrow.    Allergies   Allergen Reactions   • Ibuprofen      MAKES THROAT BURN      Current Outpatient Medications on File Prior to Visit   Medication Sig   • Prenatal Multivit-Min-Fe-FA (PRE- PO) Take  by mouth.     No current facility-administered medications on file prior to visit.      Past Medical, Surgical, Social, and Family History:  Past Medical History:   Diagnosis Date   • Contraception    • Dyspareunia    • Hypertension    • Insulin resistance    • Intractable migraine without aura and without status migrainosus 2021   • Intractable migraine without aura and without status migrainosus 2021   • IUD check up    • Ovarian cyst    • PCOS (polycystic ovarian syndrome)    • Trigeminal neuralgia    • Vitamin D deficiency      Past Surgical History:   Procedure Laterality Date   •  SECTION N/A 1/10/2018    Procedure:  SECTION PRIMARY;   "Surgeon: Soham Osorio MD;  Location: [facility] LABOR DELIVERY;  Service:    • TONSILLECTOMY AND ADENOIDECTOMY      hemorrhaged with surgical repair 3 days after surgery   • WISDOM TOOTH EXTRACTION       Social History     Socioeconomic History   • Marital status:    Tobacco Use   • Smoking status: Never   • Smokeless tobacco: Never   Vaping Use   • Vaping Use: Never used   Substance and Sexual Activity   • Alcohol use: Yes     Comment: occasional   • Drug use: No   • Sexual activity: Yes     Partners: Male     Birth control/protection: None, Pill     Family History   Problem Relation Age of Onset   • No Known Problems Mother    • No Known Problems Father    • No Known Problems Maternal Grandmother    • No Known Problems Maternal Grandfather    • Leukemia Paternal Grandmother    • No Known Problems Paternal Grandfather        Objective   Physical Exam  Vitals and nursing note reviewed.   Constitutional:       General: She is not in acute distress.     Appearance: Normal appearance. She is not toxic-appearing.   HENT:      Right Ear: Tympanic membrane, ear canal and external ear normal.      Left Ear: Tympanic membrane, ear canal and external ear normal.      Nose:      Right Sinus: Maxillary sinus tenderness present.      Left Sinus: Maxillary sinus tenderness present.      Mouth/Throat:      Comments: Thick postnasal drip present  Cardiovascular:      Rate and Rhythm: Normal rate and regular rhythm.      Pulses: Normal pulses.      Heart sounds: Normal heart sounds.   Pulmonary:      Effort: Pulmonary effort is normal.      Breath sounds: Normal breath sounds.   Skin:     General: Skin is warm and dry.      Findings: No rash.   Neurological:      Mental Status: She is alert.     /82 (BP Location: Left arm, Patient Position: Sitting, Cuff Size: Adult)   Pulse 90   Temp 97.1 °F (36.2 °C) (Infrared)   Resp 18   Ht 165.1 cm (65\")   Wt 122 kg (268 lb 6.4 oz)   SpO2 99%   BMI 44.66 kg/m²     Prior " Visit Notes/Records, Lab, Imaging, and Diagnostic Results Reviewed:  CBC:  Lab Results - Last 18 Months   Lab Units 01/09/23  1358 12/04/22  2300 06/09/22  1102   WBC K/uL 11.1* 10.71 7.05   HEMOGLOBIN g/dL 14.0 14.3 14.8   HEMATOCRIT % 41.0 41.1 42.1   PLATELETS K/uL 333 304 260      Chemistry:  Lab Results - Last 18 Months   Lab Units 12/04/22  2300 06/09/22  1102   SODIUM mmol/L 137 141   POTASSIUM mmol/L 3.7 4.2   CHLORIDE mmol/L 105 107   TOTAL CO2 mmol/L 23.0 24   GLUCOSE mg/dL 78  --    BUN mg/dL 10 10   CREATININE mg/dL 0.95 0.87   EGFR IF NONAFRICN AM mL/min/1.73m*2  --  >60   EGFR IF AFRICN AM mL/min/1.73m*2  --  >60   EGFR RESULT mL/min/1.73 81.8  --    CALCIUM mg/dL 9.7 9.8     Lab Results - Last 18 Months   Lab Units 12/04/22  2300   ALT (SGPT) U/L 22   AST (SGOT) U/L 15   ALK PHOS U/L 57       Assessment & Plan   Diagnoses and all orders for this visit:    1. Acute non-recurrent sinusitis, unspecified location (Primary)    2. 18 weeks gestation of pregnancy    Other orders  -     amoxicillin (AMOXIL) 875 MG tablet; Take 1 tablet by mouth 2 (Two) Times a Day for 10 days.  Dispense: 20 tablet; Refill: 0    Discussion:  Advised and educated plan of care. Advised amoxicillin twice daily for 10 days. Advised Flonase 1 spray in each nostril twice daily for 1 week, then decrease to once daily. Work note provided.     Follow-up:  No follow-ups on file.    Transcribed from ambient dictation for ZITA Crenshaw by Berta Archer.  03/16/23   17:22 CDT    Patient or patient representative verbalized consent to the visit recording.  I have personally performed the services described in this document as transcribed by the above individual, and it is both accurate and complete.    Electronically signed by ZITA Valverde 03/16/23, 5:22 PM CDT.

## 2023-04-13 ENCOUNTER — ROUTINE PRENATAL (OUTPATIENT)
Dept: OBGYN CLINIC | Age: 33
End: 2023-04-13

## 2023-04-13 VITALS
HEART RATE: 105 BPM | BODY MASS INDEX: 43.6 KG/M2 | DIASTOLIC BLOOD PRESSURE: 92 MMHG | SYSTOLIC BLOOD PRESSURE: 138 MMHG | WEIGHT: 262 LBS

## 2023-04-13 DIAGNOSIS — O16.2 HYPERTENSION AFFECTING PREGNANCY IN SECOND TRIMESTER: ICD-10-CM

## 2023-04-13 DIAGNOSIS — O99.210 OBESITY AFFECTING PREGNANCY, ANTEPARTUM: ICD-10-CM

## 2023-04-13 DIAGNOSIS — Z3A.21 21 WEEKS GESTATION OF PREGNANCY: Primary | ICD-10-CM

## 2023-04-13 DIAGNOSIS — O34.219 HISTORY OF CESAREAN DELIVERY, CURRENTLY PREGNANT: ICD-10-CM

## 2023-04-13 PROCEDURE — 0502F SUBSEQUENT PRENATAL CARE: CPT | Performed by: OBSTETRICS & GYNECOLOGY

## 2023-04-13 RX ORDER — NIFEDIPINE 30 MG/1
30 TABLET, EXTENDED RELEASE ORAL DAILY
Qty: 30 TABLET | Refills: 3 | Status: SHIPPED | OUTPATIENT
Start: 2023-04-13

## 2023-04-27 ENCOUNTER — ROUTINE PRENATAL (OUTPATIENT)
Dept: OBGYN CLINIC | Age: 33
End: 2023-04-27

## 2023-04-27 VITALS
DIASTOLIC BLOOD PRESSURE: 78 MMHG | BODY MASS INDEX: 43.77 KG/M2 | HEART RATE: 102 BPM | SYSTOLIC BLOOD PRESSURE: 126 MMHG | WEIGHT: 263 LBS

## 2023-04-27 DIAGNOSIS — O99.210 OBESITY AFFECTING PREGNANCY, ANTEPARTUM: ICD-10-CM

## 2023-04-27 DIAGNOSIS — O34.219 HISTORY OF CESAREAN DELIVERY, CURRENTLY PREGNANT: ICD-10-CM

## 2023-04-27 DIAGNOSIS — Z34.82 ENCOUNTER FOR SUPERVISION OF OTHER NORMAL PREGNANCY IN SECOND TRIMESTER: ICD-10-CM

## 2023-04-27 DIAGNOSIS — Z3A.23 23 WEEKS GESTATION OF PREGNANCY: Primary | ICD-10-CM

## 2023-04-27 DIAGNOSIS — O16.2 HYPERTENSION AFFECTING PREGNANCY IN SECOND TRIMESTER: ICD-10-CM

## 2023-04-27 PROCEDURE — 0502F SUBSEQUENT PRENATAL CARE: CPT | Performed by: OBSTETRICS & GYNECOLOGY

## 2023-05-19 ENCOUNTER — ROUTINE PRENATAL (OUTPATIENT)
Dept: OBGYN CLINIC | Age: 33
End: 2023-05-19

## 2023-05-19 VITALS
SYSTOLIC BLOOD PRESSURE: 127 MMHG | WEIGHT: 265 LBS | HEART RATE: 88 BPM | BODY MASS INDEX: 44.1 KG/M2 | DIASTOLIC BLOOD PRESSURE: 80 MMHG

## 2023-05-19 DIAGNOSIS — O34.219 HISTORY OF CESAREAN DELIVERY, CURRENTLY PREGNANT: Primary | ICD-10-CM

## 2023-05-19 DIAGNOSIS — O99.210 OBESITY AFFECTING PREGNANCY, ANTEPARTUM: ICD-10-CM

## 2023-05-19 DIAGNOSIS — Z34.82 ENCOUNTER FOR SUPERVISION OF OTHER NORMAL PREGNANCY IN SECOND TRIMESTER: ICD-10-CM

## 2023-05-19 DIAGNOSIS — Z36.9 ANTENATAL SCREENING ENCOUNTER: ICD-10-CM

## 2023-05-19 DIAGNOSIS — O16.2 HYPERTENSION AFFECTING PREGNANCY IN SECOND TRIMESTER: ICD-10-CM

## 2023-05-19 DIAGNOSIS — Z3A.27 27 WEEKS GESTATION OF PREGNANCY: ICD-10-CM

## 2023-05-19 LAB
ERYTHROCYTE [DISTWIDTH] IN BLOOD BY AUTOMATED COUNT: 14.3 % (ref 11.5–14.5)
GLUCOSE 1H P MEAL SERPL-MCNC: 101 MG/DL (ref 75–140)
HCT VFR BLD AUTO: 37.5 % (ref 37–47)
HGB BLD-MCNC: 12.4 G/DL (ref 12–16)
MCH RBC QN AUTO: 28.9 PG (ref 27–31)
MCHC RBC AUTO-ENTMCNC: 33.1 G/DL (ref 33–37)
MCV RBC AUTO: 87.4 FL (ref 81–99)
PLATELET # BLD AUTO: 240 K/UL (ref 130–400)
PMV BLD AUTO: 10.4 FL (ref 9.4–12.3)
RBC # BLD AUTO: 4.29 M/UL (ref 4.2–5.4)
WBC # BLD AUTO: 11.7 K/UL (ref 4.8–10.8)

## 2023-05-19 NOTE — PATIENT INSTRUCTIONS
Patient Education        Weeks 26 to 30 of Your Pregnancy: Care Instructions  You're starting your last trimester. Vera Bynum probably feel your baby moving around more. Your back may ache as your body gets used to your baby's size and length. Take care of yourself, and pay attention to what your body needs. Talk to your doctor about getting the Tdap shot. It will help protect your  against whooping cough (pertussis). You may have Forrest-Vaughan contractions. They are single or several strong contractions without a pattern. These are practice contractions but not the start of labor. Be kind to yourself. Take breaks when you're tired. Change positions often. Don't sit for too long or stand for too long. At work, rest during breaks if you can. If you don't get breaks, talk to your doctor about writing a letter to your employer to request them. Avoid fumes, chemicals, and tobacco smoke. Be sexual if you want to. You may be interested in sex, or you may not. Everyone is different. Sex is okay unless your doctor tells you not to. Your belly can make it hard to find good positions for sex. [place] and explore. Watch for signs of  labor. These signs include:   Menstrual-like cramps. Or you may have pain or pressure in your pelvis that happens in a pattern. About 6 or more contractions in an hour (even after rest and a glass of water). A low, dull backache that doesn't go away when you change positions. An increase or change in vaginal discharge. Your water breaking. Know what to do if you think you are having contractions. Drink 1 or 2 glasses of water. Lie down on your left side for at least an hour. While on your side, feel the top of your belly to see if it's tight. Write down your contractions for an hour. Time how long it is from the start of one contraction to the start of the next. Call your doctor if you have regular contractions.   Follow-up care is a key part of

## 2023-05-19 NOTE — PROGRESS NOTES
Patient presents today for routine preantal visit. Pt denies any vaginal leaking bleeding or contractions. + Fetal movement. Jesse Aragon is here for a return obstetrical visit. Today she is 27w0d weeks EGA. She is doing well and has no complaints. GCT and 3d today. Will be moving to [place] after have baby. Wondering if ok to travel to look for housing. She  does not have vaginal bleeding, leaking of fluid, contractions. She does not have blurred vision, SOB, or increased swelling in legs or face. Pt does feel fetal movement regularly. O:   Vitals:    23 1444   BP: 127/80   Pulse: 88   Weight: 265 lb (120.2 kg)     Pt is A&Ox3, in no acute distress. Normocephalic, atraumatic. PERRL. Resp even and non-labored. Skin pink, warm & dry. Gravid abdomen. PAL's well. Gait steady. See OB flowsheet. A: Normal IUP at 27w0d wks      Diagnosis Orders   1. History of  delivery, currently pregnant        2. 27 weeks gestation of pregnancy        3. Hypertension affecting pregnancy in second trimester        4. Obesity affecting pregnancy, antepartum        5.  screening encounter            P:   Pt counseled on PIH precautions, Kick count and  labor-ok to travel, if any cramping, to let us know as would need visit prior to eval  Continue with routine prenatal care. RTC in 2 wk for prenatal visit    MEDICATIONS:  No orders of the defined types were placed in this encounter. ORDERS:  No orders of the defined types were placed in this encounter.

## 2023-06-09 ENCOUNTER — ROUTINE PRENATAL (OUTPATIENT)
Dept: OBGYN CLINIC | Age: 33
End: 2023-06-09

## 2023-06-09 VITALS
DIASTOLIC BLOOD PRESSURE: 67 MMHG | SYSTOLIC BLOOD PRESSURE: 124 MMHG | BODY MASS INDEX: 44.1 KG/M2 | HEART RATE: 98 BPM | WEIGHT: 265 LBS

## 2023-06-09 DIAGNOSIS — O99.210 OBESITY AFFECTING PREGNANCY, ANTEPARTUM: ICD-10-CM

## 2023-06-09 DIAGNOSIS — O16.3 HYPERTENSION AFFECTING PREGNANCY IN THIRD TRIMESTER: ICD-10-CM

## 2023-06-09 DIAGNOSIS — Z3A.30 30 WEEKS GESTATION OF PREGNANCY: Primary | ICD-10-CM

## 2023-06-09 DIAGNOSIS — O34.219 HISTORY OF CESAREAN DELIVERY, CURRENTLY PREGNANT: ICD-10-CM

## 2023-06-09 NOTE — PROGRESS NOTES
Patient presents today for routine prenatal care. Pt denies any vaginal leaking or bleeding. She is having keyona hdez/contractions. She said some are pretty painful. + Fetal movement.
Prenatal Office Note  Subjective:  Etta Begum is here for a return obstetrical visit. Today she is 30w0d weeks EGA. Pt does feel fetal movement regularly. Denies headaches, RUQ pain, or visual changes as well as contractions, vaginal bleeding or leaking of fluid. Objective: Mother's Prenatal Vitals  BP: 124/67  Weight - Scale: 265 lb (120.2 kg)  Pulse: 98  Patient Position: Sitting  Prenatal Fetal Information  Fetal HR: 145  Movement: Present  Pt is A&Ox3, in no acute distress. Normocephalic, atraumatic. PERRL. Resp even and non-labored. Skin pink, warm & dry. Gravid abdomen. PAL's well. Gait steady. Assessment:    IUP at 30w0d wks      Diagnosis Orders   1. 30 weeks gestation of pregnancy        2. Obesity affecting pregnancy, antepartum        3. History of  delivery, currently pregnant        4. Hypertension affecting pregnancy in third trimester          Plan:  Problems/Complaints Management Plan:  Dr. Cedric Mariscal  scheduled   Routine OB Management Plan:  Pt counseled on balanced nutrition, adequate fluid intake, taking PNV daily, and exercise along with GHTN precautions, Kick count, and  labor  Continue with routine prenatal care.  surveillance indicated for hypertension and obesity. RTC in 2 wks for prenatal visit    MEDICATIONS:  No orders of the defined types were placed in this encounter. ORDERS:  No orders of the defined types were placed in this encounter. More than 50% of this 20 min visit was education and counseling.
twenty minutes after eating and count movements for up to one hour with a target value of ten movements. She was instructed to notify the office if she did not make that target after two attempts or if after any attempt there was less than four movements. The patient reports that the targets have been made Yes. Assessment:   Diagnosis Orders   1. 29 weeks gestation of pregnancy        2. Obesity affecting pregnancy, antepartum        3. Hypertension affecting pregnancy in second trimester        4. History of  delivery, currently pregnant                  Plan:  1. PTL precautions   2. Return in 2 weeks  I Arci De La Torre, am scribing for and in the presence of Dr. Rodri Guzmán. I, Dr. Rodri Guzmán, personally performed the services described in this documentation as scribed by Aric De La Torre in my presence, and it is both accurate and complete.

## 2023-06-19 ENCOUNTER — OFFICE VISIT (OUTPATIENT)
Dept: NEUROLOGY | Facility: CLINIC | Age: 33
End: 2023-06-19
Payer: COMMERCIAL

## 2023-06-19 VITALS
HEART RATE: 111 BPM | OXYGEN SATURATION: 98 % | DIASTOLIC BLOOD PRESSURE: 70 MMHG | SYSTOLIC BLOOD PRESSURE: 128 MMHG | WEIGHT: 264 LBS | BODY MASS INDEX: 43.99 KG/M2 | HEIGHT: 65 IN

## 2023-06-19 DIAGNOSIS — G50.0 TRIGEMINAL NEURALGIA OF RIGHT SIDE OF FACE: Primary | ICD-10-CM

## 2023-06-19 PROCEDURE — 99213 OFFICE O/P EST LOW 20 MIN: CPT | Performed by: NURSE PRACTITIONER

## 2023-06-19 RX ORDER — NIFEDIPINE 30 MG/1
1 TABLET, EXTENDED RELEASE ORAL DAILY
COMMUNITY
Start: 2023-04-13

## 2023-06-19 RX ORDER — ASPIRIN 81 MG/1
81 TABLET ORAL DAILY
COMMUNITY

## 2023-06-19 NOTE — PROGRESS NOTES
Neurology Progress Note      Chief Complaint:    Trigeminal Neuralgia    Subjective   History of Present Illness:  Jaquelin Rivera is a 32 y.o. female who presents today for trigeminal neuralgia.  She is routinely followed by Josef Ramsey MD for primary care.     Trigeminal Neuralgia  She remains pain free.  She is nearly one year post surgical intervention for her trigeminal neuralgia.  She remains off of treatment.  She is pregnant with about 6 weeks left and is moving to a new city soon.      Allergies:    Ibuprofen    Medications:  Current Outpatient Medications   Medication Sig Dispense Refill    aspirin 81 MG EC tablet Take 1 tablet by mouth Daily.      NIFEdipine XL (PROCARDIA XL) 30 MG 24 hr tablet Take 1 tablet by mouth Daily.      Prenatal Multivit-Min-Fe-FA (PRE-DELBERT PO) Take  by mouth.       No current facility-administered medications for this visit.     Current outpatient and discharge medications have been reconciled for the patient.  Reviewed by: ZITA Connolly    Past Medical History:  Past Medical History:   Diagnosis Date    Contraception     Dyspareunia     Hypertension     Insulin resistance     Intractable migraine without aura and without status migrainosus 2021    Intractable migraine without aura and without status migrainosus 2021    IUD check up     Ovarian cyst     PCOS (polycystic ovarian syndrome)     Trigeminal neuralgia     Vitamin D deficiency      Past Surgical History:   Procedure Laterality Date     SECTION N/A 1/10/2018    Procedure:  SECTION PRIMARY;  Surgeon: Soham Osorio MD;  Location: [facility] LABOR DELIVERY;  Service:     TONSILLECTOMY AND ADENOIDECTOMY      hemorrhaged with surgical repair 3 days after surgery    WISDOM TOOTH EXTRACTION       Family History   Problem Relation Age of Onset    No Known Problems Mother     No Known Problems Father     No Known Problems Maternal Grandmother     No Known Problems Maternal  "Grandfather     Leukemia Paternal Grandmother     No Known Problems Paternal Grandfather      Social History     Tobacco Use    Smoking status: Never    Smokeless tobacco: Never   Vaping Use    Vaping Use: Never used   Substance Use Topics    Alcohol use: Yes     Comment: occasional    Drug use: No     Review of Systems   HENT:  Negative for facial swelling.    Neurological:  Negative for facial asymmetry, speech difficulty and weakness.   All other systems reviewed and are negative.      Objective   Vital Signs:  Heart Rate:  [111] 111  BP: (128)/(70) 128/70      06/19/23  1122   Weight: 120 kg (264 lb)     165.1 cm (65\")  Body mass index is 43.93 kg/m².    Physical Exam  Vitals reviewed.   Constitutional:       Appearance: Normal appearance.   HENT:      Head: Normocephalic.   Eyes:      General: Lids are normal.      Extraocular Movements: Extraocular movements intact.      Pupils: Pupils are equal, round, and reactive to light.   Cardiovascular:      Rate and Rhythm: Normal rate and regular rhythm.      Pulses: Normal pulses.   Pulmonary:      Effort: Pulmonary effort is normal.   Musculoskeletal:         General: Normal range of motion.      Cervical back: Normal range of motion and neck supple.   Skin:     General: Skin is warm and dry.      Capillary Refill: Capillary refill takes less than 2 seconds.   Neurological:      Motor: Motor strength is normal.      Coordination: Coordination is intact.      Gait: Gait is intact.      Deep Tendon Reflexes: Reflexes are normal and symmetric.   Psychiatric:         Mood and Affect: Mood normal.         Speech: Speech normal.     Neurological Exam  Mental Status  Awake, alert and oriented to person, place and time. Recent and remote memory are intact. Speech is normal. Language is fluent with no aphasia. Attention and concentration are normal.    Cranial Nerves  CN II: Visual acuity is normal. Visual fields full to confrontation.  CN III, IV, VI: Extraocular movements " intact bilaterally. Normal lids and orbits bilaterally. Pupils equal round and reactive to light bilaterally.  CN V: Facial sensation is normal.  CN VII: Full and symmetric facial movement.  CN IX, X: Palate elevates symmetrically. Normal gag reflex.  CN XI: Shoulder shrug strength is normal.  CN XII: Tongue midline without atrophy or fasciculations.    Motor   Strength is 5/5 throughout all four extremities.    Sensory  Sensation is intact to light touch, pinprick, vibration and proprioception in all four extremities.    Reflexes  Deep tendon reflexes are 2+ and symmetric in all four extremities.    Coordination    Finger-to-nose, rapid alternating movements and heel-to-shin normal bilaterally without dysmetria.    Gait   Normal gait.     Results Review:    Lab Results   Component Value Date    GLUCOSE 78 12/04/2022    BUN 10 12/04/2022    CREATININE 0.95 12/04/2022    EGFRIFNONA >60 06/09/2022    EGFRIFAFRI >60 06/09/2022    BCR 10.5 12/04/2022    K 3.7 12/04/2022    CO2 23.0 12/04/2022    CALCIUM 9.7 12/04/2022    PROTENTOTREF 6.6 12/04/2022    ALBUMIN 4.30 12/04/2022    LABIL2 1.9 12/04/2022    AST 15 12/04/2022    ALT 22 12/04/2022     Lab Results   Component Value Date    WBC 11.7 (H) 05/19/2023    HGB 12.4 05/19/2023    HCT 37.5 05/19/2023    MCV 87.4 05/19/2023     05/19/2023     Lab Results   Component Value Date    CHLPL 158 12/04/2022    TRIG 124 12/04/2022    HDL 54 12/04/2022    LDL 82 12/04/2022     Lab Results   Component Value Date    TSH 2.480 12/04/2022     Lab Results   Component Value Date    HGBA1C 4.42 (L) 04/08/2021     No results found for: FOLATE  No results found for: [id number]     Plan .  Assessment:  Jaquelin Rivera is a 32 y.o. female who presents with trigeminal neuralgia.  She remains pain free nearly one year post surgical intervention. She remains doing phenomenal.  She is moving soon but due to her being pain free, I do not see the need for neurolgoy establishment at  this time.  However, I will be happy to place referral if needed.  No need for any further intervention at this time. She is understanding and agreeable today.       Plan:  No need for addition of pain relieving agents.   Call with need for Neurology referral.   Call me if facial pain increases or if any other issues arise.     The patient and I have discussed the plan of care and she is in full agreement at this time.     Follow-Up:  Return if symptoms worsen or fail to improve.       Sourav Arguelles, ZITA  06/19/23  11:58 CDT

## 2023-06-23 ENCOUNTER — ROUTINE PRENATAL (OUTPATIENT)
Dept: OBGYN CLINIC | Age: 33
End: 2023-06-23

## 2023-06-23 VITALS
DIASTOLIC BLOOD PRESSURE: 87 MMHG | SYSTOLIC BLOOD PRESSURE: 128 MMHG | HEART RATE: 119 BPM | WEIGHT: 267 LBS | BODY MASS INDEX: 44.43 KG/M2

## 2023-06-23 DIAGNOSIS — O99.210 OBESITY AFFECTING PREGNANCY, ANTEPARTUM: ICD-10-CM

## 2023-06-23 DIAGNOSIS — O16.3 HYPERTENSION AFFECTING PREGNANCY IN THIRD TRIMESTER: ICD-10-CM

## 2023-06-23 DIAGNOSIS — O34.219 HISTORY OF CESAREAN DELIVERY, CURRENTLY PREGNANT: ICD-10-CM

## 2023-06-23 DIAGNOSIS — Z3A.32 32 WEEKS GESTATION OF PREGNANCY: Primary | ICD-10-CM

## 2023-06-23 RX ORDER — ASPIRIN 81 MG/1
81 TABLET ORAL DAILY
COMMUNITY
End: 2023-06-23 | Stop reason: CLARIF

## 2023-06-23 RX ORDER — ASPIRIN 81 MG/1
81 TABLET, CHEWABLE ORAL DAILY
COMMUNITY

## 2023-06-23 NOTE — PROGRESS NOTES
Prenatal Office Note  Subjective:  Leti Barajas is here for a return obstetrical visit. Today she is 32w0d weeks EGA. Pt does feel fetal movement regularly. Denies headaches, RUQ pain, or visual changes as well as contractions, vaginal bleeding or leaking of fluid. Objective: Mother's Prenatal Vitals  BP: 128/87  Weight - Scale: 267 lb (121.1 kg)  Pulse: (!) 119  Patient Position: Sitting  Prenatal Fetal Information  Fetal HR: 145  Movement: Present  Pt is A&Ox3, in no acute distress. Normocephalic, atraumatic. PERRL. Resp even and non-labored. Skin pink, warm & dry. Gravid abdomen. PAL's well. Gait steady. Assessment:    IUP at 32w0d wks      Diagnosis Orders   1. 32 weeks gestation of pregnancy        2. Obesity affecting pregnancy, antepartum        3. History of  delivery, currently pregnant        4. Hypertension affecting pregnancy in third trimester          Plan:    Routine OB Management Plan:  Pt counseled on balanced nutrition, adequate fluid intake, taking PNV daily, and exercise along with GHTN precautions, Kick count, and  labor  Continue with routine prenatal care.  surveillance indicated for hypertension, obesity  RTC in 1 wks for prenatal visit    MEDICATIONS:  No orders of the defined types were placed in this encounter. ORDERS:  No orders of the defined types were placed in this encounter. More than 50% of this 20 min visit was education and counseling.

## 2023-06-30 ENCOUNTER — ROUTINE PRENATAL (OUTPATIENT)
Dept: OBGYN CLINIC | Age: 33
End: 2023-06-30

## 2023-06-30 VITALS
WEIGHT: 267 LBS | SYSTOLIC BLOOD PRESSURE: 118 MMHG | DIASTOLIC BLOOD PRESSURE: 77 MMHG | HEART RATE: 104 BPM | BODY MASS INDEX: 44.43 KG/M2

## 2023-06-30 DIAGNOSIS — O34.219 HISTORY OF CESAREAN DELIVERY, CURRENTLY PREGNANT: ICD-10-CM

## 2023-06-30 DIAGNOSIS — O16.3 HYPERTENSION AFFECTING PREGNANCY IN THIRD TRIMESTER: ICD-10-CM

## 2023-06-30 DIAGNOSIS — Z3A.33 33 WEEKS GESTATION OF PREGNANCY: ICD-10-CM

## 2023-06-30 DIAGNOSIS — Z34.93 PRENATAL CARE IN THIRD TRIMESTER: Primary | ICD-10-CM

## 2023-06-30 DIAGNOSIS — O26.899 PELVIC PAIN DURING PREGNANCY: ICD-10-CM

## 2023-06-30 DIAGNOSIS — O99.210 OBESITY AFFECTING PREGNANCY, ANTEPARTUM: ICD-10-CM

## 2023-06-30 DIAGNOSIS — R10.2 PELVIC PAIN DURING PREGNANCY: ICD-10-CM

## 2023-06-30 LAB
BACTERIA URNS QL MICRO: ABNORMAL /HPF
BILIRUB UR QL STRIP: NEGATIVE
CLARITY UR: CLEAR
COLOR UR: YELLOW
CRYSTALS URNS MICRO: ABNORMAL /HPF
EPI CELLS #/AREA URNS AUTO: 7 /HPF (ref 0–5)
GLUCOSE UR STRIP.AUTO-MCNC: NEGATIVE MG/DL
HGB UR STRIP.AUTO-MCNC: NEGATIVE MG/L
HYALINE CASTS #/AREA URNS AUTO: 5 /HPF (ref 0–8)
KETONES UR STRIP.AUTO-MCNC: ABNORMAL MG/DL
LEUKOCYTE ESTERASE UR QL STRIP.AUTO: ABNORMAL
NITRITE UR QL STRIP.AUTO: NEGATIVE
PH UR STRIP.AUTO: 6.5 [PH] (ref 5–8)
PROT UR STRIP.AUTO-MCNC: ABNORMAL MG/DL
RBC #/AREA URNS AUTO: 1 /HPF (ref 0–4)
SP GR UR STRIP.AUTO: 1.02 (ref 1–1.03)
UROBILINOGEN UR STRIP.AUTO-MCNC: 0.2 E.U./DL
WBC #/AREA URNS AUTO: 5 /HPF (ref 0–5)

## 2023-07-02 LAB — BACTERIA UR CULT: NORMAL

## 2023-07-04 ENCOUNTER — HOSPITAL ENCOUNTER (OUTPATIENT)
Age: 33
Discharge: HOME | End: 2023-07-04
Attending: OBSTETRICS & GYNECOLOGY | Admitting: OBSTETRICS & GYNECOLOGY
Payer: COMMERCIAL

## 2023-07-04 VITALS
HEART RATE: 80 BPM | BODY MASS INDEX: 44.48 KG/M2 | RESPIRATION RATE: 16 BRPM | DIASTOLIC BLOOD PRESSURE: 55 MMHG | OXYGEN SATURATION: 100 % | SYSTOLIC BLOOD PRESSURE: 105 MMHG | HEIGHT: 65 IN | WEIGHT: 267 LBS | TEMPERATURE: 97.1 F

## 2023-07-04 PROBLEM — Z3A.33 33 WEEKS GESTATION OF PREGNANCY: Status: ACTIVE | Noted: 2023-07-04

## 2023-07-04 LAB
ALBUMIN SERPL-MCNC: 3.4 G/DL (ref 3.5–5.2)
ALP SERPL-CCNC: 95 U/L (ref 35–104)
ALT SERPL-CCNC: 11 U/L (ref 5–33)
ANION GAP SERPL CALCULATED.3IONS-SCNC: 12 MMOL/L (ref 7–19)
AST SERPL-CCNC: 11 U/L (ref 5–32)
BASOPHILS # BLD: 0 K/UL (ref 0–0.2)
BASOPHILS NFR BLD: 0.4 % (ref 0–1)
BILIRUB SERPL-MCNC: <0.2 MG/DL (ref 0.2–1.2)
BILIRUB UR QL STRIP: NEGATIVE
BUN SERPL-MCNC: 6 MG/DL (ref 6–20)
CALCIUM SERPL-MCNC: 9.2 MG/DL (ref 8.6–10)
CHLORIDE SERPL-SCNC: 103 MMOL/L (ref 98–111)
CLARITY UR: CLEAR
CO2 SERPL-SCNC: 20 MMOL/L (ref 22–29)
COLOR UR: YELLOW
CREAT SERPL-MCNC: 0.6 MG/DL (ref 0.5–0.9)
CREAT UR-MCNC: 99.8 MG/DL (ref 28–217)
EOSINOPHIL # BLD: 0.1 K/UL (ref 0–0.6)
EOSINOPHIL NFR BLD: 1.1 % (ref 0–5)
ERYTHROCYTE [DISTWIDTH] IN BLOOD BY AUTOMATED COUNT: 14.2 % (ref 11.5–14.5)
GLUCOSE SERPL-MCNC: 89 MG/DL (ref 74–109)
GLUCOSE UR STRIP.AUTO-MCNC: NEGATIVE MG/DL
HCT VFR BLD AUTO: 35.4 % (ref 37–47)
HGB BLD-MCNC: 12.3 G/DL (ref 12–16)
HGB UR STRIP.AUTO-MCNC: NEGATIVE MG/L
IMM GRANULOCYTES # BLD: 0.1 K/UL
KETONES UR STRIP.AUTO-MCNC: NEGATIVE MG/DL
LEUKOCYTE ESTERASE UR QL STRIP.AUTO: NEGATIVE
LYMPHOCYTES # BLD: 2.4 K/UL (ref 1.1–4.5)
LYMPHOCYTES NFR BLD: 22.5 % (ref 20–40)
MCH RBC QN AUTO: 28.9 PG (ref 27–31)
MCHC RBC AUTO-ENTMCNC: 34.7 G/DL (ref 33–37)
MCV RBC AUTO: 83.3 FL (ref 81–99)
MONOCYTES # BLD: 0.6 K/UL (ref 0–0.9)
MONOCYTES NFR BLD: 6 % (ref 0–10)
NEUTROPHILS # BLD: 7.4 K/UL (ref 1.5–7.5)
NEUTS SEG NFR BLD: 69.2 % (ref 50–65)
NITRITE UR QL STRIP.AUTO: NEGATIVE
PH UR STRIP.AUTO: 7 [PH] (ref 5–8)
PLATELET # BLD AUTO: 236 K/UL (ref 130–400)
PMV BLD AUTO: 10.8 FL (ref 9.4–12.3)
POTASSIUM SERPL-SCNC: 4 MMOL/L (ref 3.5–5)
PROT SERPL-MCNC: 6.6 G/DL (ref 6.6–8.7)
PROT UR STRIP.AUTO-MCNC: NEGATIVE MG/DL
PROT UR-MCNC: 11 MG/DL (ref 15–45)
RBC # BLD AUTO: 4.25 M/UL (ref 4.2–5.4)
SODIUM SERPL-SCNC: 135 MMOL/L (ref 136–145)
SP GR UR STRIP.AUTO: 1.01 (ref 1–1.03)
UROBILINOGEN UR STRIP.AUTO-MCNC: 0.2 E.U./DL
WBC # BLD AUTO: 10.7 K/UL (ref 4.8–10.8)

## 2023-07-04 PROCEDURE — 36415 COLL VENOUS BLD VENIPUNCTURE: CPT

## 2023-07-04 PROCEDURE — 99212 OFFICE O/P EST SF 10 MIN: CPT

## 2023-07-04 PROCEDURE — 6370000000 HC RX 637 (ALT 250 FOR IP): Performed by: ADVANCED PRACTICE MIDWIFE

## 2023-07-04 PROCEDURE — 84156 ASSAY OF PROTEIN URINE: CPT

## 2023-07-04 PROCEDURE — 82570 ASSAY OF URINE CREATININE: CPT

## 2023-07-04 PROCEDURE — 80053 COMPREHEN METABOLIC PANEL: CPT

## 2023-07-04 PROCEDURE — 85025 COMPLETE CBC W/AUTO DIFF WBC: CPT

## 2023-07-04 PROCEDURE — 81003 URINALYSIS AUTO W/O SCOPE: CPT

## 2023-07-04 RX ORDER — DIPHENHYDRAMINE HCL 25 MG
25 TABLET ORAL ONCE
Status: COMPLETED | OUTPATIENT
Start: 2023-07-04 | End: 2023-07-04

## 2023-07-04 RX ORDER — ACETAMINOPHEN 500 MG
1000 TABLET ORAL ONCE
Status: COMPLETED | OUTPATIENT
Start: 2023-07-04 | End: 2023-07-04

## 2023-07-04 RX ADMIN — DIPHENHYDRAMINE HYDROCHLORIDE 25 MG: 25 TABLET ORAL at 16:10

## 2023-07-04 RX ADMIN — ACETAMINOPHEN 1000 MG: 500 TABLET ORAL at 16:10

## 2023-07-04 ASSESSMENT — PAIN DESCRIPTION - DESCRIPTORS: DESCRIPTORS: ACHING

## 2023-07-04 ASSESSMENT — PAIN DESCRIPTION - LOCATION: LOCATION: HEAD

## 2023-07-04 ASSESSMENT — PAIN SCALES - GENERAL: PAINLEVEL_OUTOF10: 6

## 2023-07-04 NOTE — DISCHARGE INSTRUCTIONS

## 2023-07-04 NOTE — FLOWSHEET NOTE
Discharge teaching provided on labor precautions. Pt had no questions at this time. Instructed pt to call with any questions or concerns. Pt verbalized understanding.

## 2023-07-04 NOTE — PROGRESS NOTES
Pt arrives in L&D stating she had a high blood pressure at home of 160/90 and was told to come in by the provider on call. Efm and toco applied to soft, non-tender abdomen. Pt denies any vaginal bleeding or lof. Pt confirms +FM. Will continue to monitor.

## 2023-07-06 ENCOUNTER — ROUTINE PRENATAL (OUTPATIENT)
Dept: OBGYN CLINIC | Age: 33
End: 2023-07-06

## 2023-07-06 VITALS
HEART RATE: 111 BPM | BODY MASS INDEX: 44.43 KG/M2 | SYSTOLIC BLOOD PRESSURE: 133 MMHG | DIASTOLIC BLOOD PRESSURE: 83 MMHG | WEIGHT: 267 LBS

## 2023-07-06 DIAGNOSIS — O16.3 HYPERTENSION AFFECTING PREGNANCY IN THIRD TRIMESTER: ICD-10-CM

## 2023-07-06 DIAGNOSIS — O34.219 HISTORY OF CESAREAN DELIVERY, CURRENTLY PREGNANT: ICD-10-CM

## 2023-07-06 DIAGNOSIS — Z3A.34 34 WEEKS GESTATION OF PREGNANCY: Primary | ICD-10-CM

## 2023-07-06 DIAGNOSIS — O99.210 OBESITY AFFECTING PREGNANCY, ANTEPARTUM: ICD-10-CM

## 2023-07-06 DIAGNOSIS — Z34.93 PRENATAL CARE IN THIRD TRIMESTER: ICD-10-CM

## 2023-07-06 NOTE — PROGRESS NOTES
Patient presents today for routine prenatal care. Pt denies any vaginal leaking bleeding but she is having irreguar contractions. + Fetal movement. Had a bad headache the other day. 160/90 BP-so Trish told her to come in.

## 2023-07-06 NOTE — PROGRESS NOTES
CNM Prenatal Office Note  Subjective:  Johny Schrader is here for a return obstetrical visit. Today she is 33w6d weeks EGA. Pt does feel fetal movement regularly. Denies headaches, RUQ pain, or visual changes as well as contractions, vaginal bleeding or leaking of fluid. Patient reports that Dr. Jairo Booker has her due date as 23 instead of 23 due to her LMP being approximate. She is concerned this could affect her care if she has recommendations to deliver before 39 weeks. Problems/complaints today:  none  Objective: Mother's Prenatal Vitals  BP: 133/83  Weight - Scale: 267 lb (121.1 kg)  Pulse: (!) 111  Patient Position: Sitting  Prenatal Fetal Information  Fetal HR: 147   Movement: Present  Pt is A&Ox3, in no acute distress. Normocephalic, atraumatic. PERRL. Resp even and non-labored. Skin pink, warm & dry. Gravid abdomen. PAL's well. Gait steady. Assessment:    IUP at 33w6d wks      Diagnosis Orders   1. 34 weeks gestation of pregnancy        2. Obesity affecting pregnancy, antepartum        3. Prenatal care in third trimester        4. History of  delivery, currently pregnant        5. Hypertension affecting pregnancy in third trimester          Plan:  Problems/Complaints Management Plan:  Continue care  Routine OB Management Plan:  Pt counseled on balanced nutrition, adequate fluid intake, taking PNV daily, and exercise along with GHTN precautions, Kick count, and  labor  Continue with routine prenatal care.  surveillance indicated for obesity, gestational hypertension  NST reactive in office today. RTC in 1 wks for prenatal visit    MEDICATIONS:  No orders of the defined types were placed in this encounter. ORDERS:  No orders of the defined types were placed in this encounter. More than 50% of this 20 min visit was education and counseling.

## 2023-07-11 ENCOUNTER — HOSPITAL ENCOUNTER (OUTPATIENT)
Age: 33
Discharge: HOME | End: 2023-07-11
Attending: OBSTETRICS & GYNECOLOGY | Admitting: OBSTETRICS & GYNECOLOGY
Payer: COMMERCIAL

## 2023-07-11 VITALS — DIASTOLIC BLOOD PRESSURE: 93 MMHG | TEMPERATURE: 97.4 F | HEART RATE: 85 BPM | SYSTOLIC BLOOD PRESSURE: 123 MMHG

## 2023-07-11 DIAGNOSIS — L29.9 PRURITUS OF PREGNANCY IN THIRD TRIMESTER: Primary | ICD-10-CM

## 2023-07-11 DIAGNOSIS — L29.9 PRURITUS OF PREGNANCY IN THIRD TRIMESTER: ICD-10-CM

## 2023-07-11 DIAGNOSIS — O99.713 PRURITUS OF PREGNANCY IN THIRD TRIMESTER: ICD-10-CM

## 2023-07-11 DIAGNOSIS — O99.713 PRURITUS OF PREGNANCY IN THIRD TRIMESTER: Primary | ICD-10-CM

## 2023-07-11 PROBLEM — O16.3 HYPERTENSION AFFECTING PREGNANCY IN THIRD TRIMESTER: Status: ACTIVE | Noted: 2023-07-11

## 2023-07-11 LAB
ALBUMIN SERPL-MCNC: 3.6 G/DL (ref 3.5–5.2)
ALP SERPL-CCNC: 110 U/L (ref 35–104)
ALT SERPL-CCNC: 12 U/L (ref 5–33)
ANION GAP SERPL CALCULATED.3IONS-SCNC: 14 MMOL/L (ref 7–19)
AST SERPL-CCNC: 22 U/L (ref 5–32)
BILIRUB SERPL-MCNC: 0.3 MG/DL (ref 0.2–1.2)
BUN SERPL-MCNC: 8 MG/DL (ref 6–20)
CALCIUM SERPL-MCNC: 9.3 MG/DL (ref 8.6–10)
CHLORIDE SERPL-SCNC: 103 MMOL/L (ref 98–111)
CO2 SERPL-SCNC: 20 MMOL/L (ref 22–29)
CREAT SERPL-MCNC: 0.8 MG/DL (ref 0.5–0.9)
GLUCOSE SERPL-MCNC: 97 MG/DL (ref 74–109)
POTASSIUM SERPL-SCNC: 4 MMOL/L (ref 3.5–5)
PROT SERPL-MCNC: 7.2 G/DL (ref 6.6–8.7)
SODIUM SERPL-SCNC: 137 MMOL/L (ref 136–145)

## 2023-07-11 PROCEDURE — 99212 OFFICE O/P EST SF 10 MIN: CPT

## 2023-07-11 NOTE — FLOWSHEET NOTE
Pt presents to L&D ambulatory, steady gait noted. Pt states she has had itching in her lower extremities (knees to ankles) tonight and called her neighbor for Aloe for itch relief. Her neighbor took her BP multiple times over the course of an hour and found her BP to routinely be 150s/110. Pt denies other s/s of pre-eclampsia and states she took her Procardia this morning as directed. Pt denies LOF, denies feeling UCs, reports + fetal mvmt. EFM and TOCO applied to soft, non tender abd.

## 2023-07-11 NOTE — FLOWSHEET NOTE
Dr Mirna Alvarez notified of pt status in OB including pt c/o itching in lower extemities and vital signs since arrival. Order received to d/t p to home.

## 2023-07-11 NOTE — DISCHARGE INSTRUCTIONS
[facility] LABOR AND DELIVERY - OBSERVATION DISCHARGE INSTRUCTIONS    TERM LABOR: RETURN TO HOSPITAL IF:  __There is a leaking or sudden gush of fluid from the vagina (note color, odor, time and amount of fluid)    __ You have bright red vaginal bleeding    __You begin to have regular contractions, occuring every 3-5 minutes, each contraction lasting 45-60 seconds for one hour. Time contractions from beginning of one to the beginning of the next. True contractions have a regular rate and become progressively closer. They are not changed by position change and are usually more uncomfortable when walking. PRE-TERM LABOR  _X_Your gestational age is 27 weeks: term pregnancy starts at 42 weeks. __Fill your prescription and take as directed. _X_Bed rest (left lying position is best). _X_Refrain from sexual intercourse until you see your physician again. _X_No heavy lifting or strenuous activity. RETURN TO HOSPITAL IF:  _X_Contractions occur 3-4 in any hour (every 10-15 minutes), maybe with or without pain. _X_Rhythmic or constant menstrual-like cramps  _X_Rhythmic or constant lower, dull backache may radiate to the sides or front. Increase or change in vaginal discharge, may be watery, pink, red or brown-tinged. PRE-ECLAMPSIA  _X_Bed rest, left side lying position  _X_Elevate legs while sitting  _X_Maintain quiet, peaceful environment  _X_Eat well-balanced meals, no added salt, avoid processed lunch meats, canned soups, potato chips, etc.    SYMPTOMS THAT REQUIRE PROMPT REPORTING:  _X_Rapid gain in weight  _X_Persistent or severe headache  _X_Visual disturbances (spots, blurred)  _X_Nausea/vomiting, with or without upper abdominal pain.   _X_Increase or persistent swelling (face puffiness, hands, legs, ankles)  _X_Decreased urinary output  _X_Drowsiness listlessness    NAUSEA/VOMITING HYPEREMESIS  __Eat small, frequent meals instead of three large meals  __Drink liquids (such as 7-up or ginger ale) between meals

## 2023-07-11 NOTE — FLOWSHEET NOTE
Discharge instructions given to pt. All questions answered. Pt verbalized understanding and instructed to keep regular scheduled appt with OB provider for Thursday. S/S of pre-eclampsia reviewed with pt.

## 2023-07-12 NOTE — PROGRESS NOTES
Pt denies any vaginal leaking bleeding or contractions.    Pt states she has had some contractions 10-15 mins apart

## 2023-07-12 NOTE — PROGRESS NOTES
Aurelia Jones is a 28 y.o. female 35w3d who presents for routine prenatal visit. The patient was seen and evaluated. There was positive fetal movements. No contractions, bleeding or leakage of fluid. Signs and symptoms of  labor as well as labor were reviewed. The S/S of Pre-Eclampsia were reviewed with the patient in detail. She is to report any of these if they occur. She currently denies any of these. Tova Solis is a 28 y.o. female with the following history as recorded in [facility]:  Patient Active Problem List    Diagnosis Date Noted    Hypertension affecting pregnancy in third trimester 2023    33 weeks gestation of pregnancy 2023    Pituitary adenoma ([address]) 2020    Trigeminal neuralgia of right side of face 2016     Current Outpatient Medications   Medication Sig Dispense Refill    aspirin 81 MG chewable tablet Take 1 tablet by mouth daily      NIFEdipine (PROCARDIA XL) 30 MG extended release tablet Take 1 tablet by mouth daily 30 tablet 3    Prenatal MV-Min-Fe Fum-FA-DHA (PRENATAL 1 PO) Take by mouth      calcium carbonate (OS-LISHA) 1250 (500 Ca) MG chewable tablet Take 1 tablet by mouth daily       No current facility-administered medications for this visit. Allergies: Ibuprofen  Past Medical History:   Diagnosis Date    Trigeminal neuralgia     Right     Past Surgical History:   Procedure Laterality Date     SECTION  2018    FACIAL NERVE SURGERY      radiosurgery for trigeminal neuralgia    TONSILLECTOMY AND ADENOIDECTOMY      TRIGEMINAL NERVE DECOMPRESSION      WISDOM TOOTH EXTRACTION       History reviewed. No pertinent family history.   Social History     Tobacco Use    Smoking status: Never    Smokeless tobacco: Never   Substance Use Topics    Alcohol use: No     Alcohol/week: 0.0 standard drinks         Mother's Prenatal Vitals  BP: 126/83  Weight - Scale: 265 lb (120.2 kg)  Pulse: (!) 113  Patient Position: Sitting  Prenatal Fetal

## 2023-07-13 ENCOUNTER — ROUTINE PRENATAL (OUTPATIENT)
Dept: OBGYN CLINIC | Age: 33
End: 2023-07-13

## 2023-07-13 VITALS
DIASTOLIC BLOOD PRESSURE: 83 MMHG | HEART RATE: 113 BPM | BODY MASS INDEX: 44.1 KG/M2 | WEIGHT: 265 LBS | SYSTOLIC BLOOD PRESSURE: 126 MMHG

## 2023-07-13 DIAGNOSIS — O16.3 HYPERTENSION AFFECTING PREGNANCY IN THIRD TRIMESTER: Primary | ICD-10-CM

## 2023-07-13 DIAGNOSIS — O99.210 OBESITY AFFECTING PREGNANCY, ANTEPARTUM: ICD-10-CM

## 2023-07-13 DIAGNOSIS — Z3A.35 35 WEEKS GESTATION OF PREGNANCY: ICD-10-CM

## 2023-07-13 DIAGNOSIS — Z36.85 ENCOUNTER FOR ANTENATAL SCREENING FOR STREPTOCOCCUS B: ICD-10-CM

## 2023-07-13 LAB — BILE AC SERPL-SCNC: 1 UMOL/L (ref 0–10)

## 2023-07-13 RX ORDER — UREA 10 %
1 LOTION (ML) TOPICAL DAILY
COMMUNITY

## 2023-07-13 NOTE — PATIENT INSTRUCTIONS
Patient Education        Weeks 34 to 39 of Your Pregnancy: Care Instructions  Your belly has grown quite large. It's almost time to give birth! Your baby's lungs are almost ready to breathe air. The skull bones are firm enough to protect your baby's head. But they're soft enough to move down through the birth canal.    You might be wondering what to expect during labor. Because each birth is different, there's no way to know exactly what childbirth will be like for you. Talk to your doctor or midwife about any concerns you have. Melody Vyas probably have a test for group B streptococcus (GBS). GBS is bacteria that can live in the vagina and rectum. GBS can make your baby sick after birth. If you test positive, you'll get antibiotics during labor. Choose what type of pain relief you would like during labor. You can choose from a few types, including medicine and non-medicine options. You may want to use several types of pain relief. Know how medicines can help with pain during labor. Some medicines lower anxiety and help with some of the pain. Others make your lower body numb so that you will feel less pain. Tell your doctor about your pain medicine choice. Do this before you start labor or very early in your labor. You may be able to change your mind during labor. Learn about the stages of labor. The first stage includes the early (latent) and active phases of labor. Contractions start in early labor. During active labor, contractions get stronger, last longer, and happen more often. And the cervix opens more rapidly. The second stage starts when you're ready to push. During this stage, your baby is born. During the third stage, you'll usually have a few more contractions to push out the placenta. Follow-up care is a key part of your treatment and safety. Be sure to make and go to all appointments, and call your doctor if you are having problems.  It's also a good idea to know your test results

## 2023-07-14 LAB — GP B STREP DNA SPEC QL NAA+PROBE: NOT DETECTED

## 2023-07-17 ENCOUNTER — HOSPITAL ENCOUNTER (OUTPATIENT)
Age: 33
Discharge: HOME | End: 2023-07-17
Attending: OBSTETRICS & GYNECOLOGY | Admitting: OBSTETRICS & GYNECOLOGY
Payer: COMMERCIAL

## 2023-07-17 VITALS
WEIGHT: 265 LBS | SYSTOLIC BLOOD PRESSURE: 119 MMHG | BODY MASS INDEX: 44.15 KG/M2 | DIASTOLIC BLOOD PRESSURE: 82 MMHG | HEART RATE: 100 BPM | TEMPERATURE: 96.8 F | HEIGHT: 65 IN | OXYGEN SATURATION: 98 %

## 2023-07-17 PROBLEM — O16.3 HYPERTENSION AFFECTING PREGNANCY, THIRD TRIMESTER: Status: ACTIVE | Noted: 2023-07-17

## 2023-07-17 PROBLEM — Z3A.33 33 WEEKS GESTATION OF PREGNANCY: Status: RESOLVED | Noted: 2023-07-04 | Resolved: 2023-07-17

## 2023-07-17 LAB
ALBUMIN SERPL-MCNC: 4 G/DL (ref 3.5–5.2)
ALP SERPL-CCNC: 129 U/L (ref 35–104)
ALT SERPL-CCNC: 27 U/L (ref 5–33)
ANION GAP SERPL CALCULATED.3IONS-SCNC: 14 MMOL/L (ref 7–19)
AST SERPL-CCNC: 22 U/L (ref 5–32)
BACTERIA #/AREA URNS HPF: ABNORMAL /HPF
BASOPHILS # BLD: 0.1 K/UL (ref 0–0.2)
BASOPHILS NFR BLD: 0.4 % (ref 0–1)
BILIRUB SERPL-MCNC: 0.4 MG/DL (ref 0.2–1.2)
BILIRUB UR QL STRIP: NEGATIVE
BUN SERPL-MCNC: 6 MG/DL (ref 6–20)
CALCIUM SERPL-MCNC: 9.5 MG/DL (ref 8.6–10)
CHLORIDE SERPL-SCNC: 102 MMOL/L (ref 98–111)
CLARITY UR: ABNORMAL
CO2 SERPL-SCNC: 19 MMOL/L (ref 22–29)
COLOR UR: ABNORMAL
CREAT SERPL-MCNC: 0.8 MG/DL (ref 0.5–0.9)
EOSINOPHIL # BLD: 0 K/UL (ref 0–0.6)
EOSINOPHIL NFR BLD: 0.3 % (ref 0–5)
ERYTHROCYTE [DISTWIDTH] IN BLOOD BY AUTOMATED COUNT: 14.4 % (ref 11.5–14.5)
FINE GRAN CASTS #/AREA URNS HPF: ABNORMAL /LPF (ref 0–2)
GLUCOSE SERPL-MCNC: 95 MG/DL (ref 74–109)
GLUCOSE UR STRIP.AUTO-MCNC: NEGATIVE MG/DL
HCT VFR BLD AUTO: 37.6 % (ref 37–47)
HGB BLD-MCNC: 12.9 G/DL (ref 12–16)
HGB UR STRIP.AUTO-MCNC: ABNORMAL MG/L
IMM GRANULOCYTES # BLD: 0.1 K/UL
KETONES UR STRIP.AUTO-MCNC: NEGATIVE MG/DL
LEUKOCYTE ESTERASE UR QL STRIP.AUTO: ABNORMAL
LYMPHOCYTES # BLD: 2 K/UL (ref 1.1–4.5)
LYMPHOCYTES NFR BLD: 17.2 % (ref 20–40)
MCH RBC QN AUTO: 28.9 PG (ref 27–31)
MCHC RBC AUTO-ENTMCNC: 34.3 G/DL (ref 33–37)
MCV RBC AUTO: 84.3 FL (ref 81–99)
MONOCYTES # BLD: 0.6 K/UL (ref 0–0.9)
MONOCYTES NFR BLD: 4.7 % (ref 0–10)
NEUTROPHILS # BLD: 8.9 K/UL (ref 1.5–7.5)
NEUTS SEG NFR BLD: 76.5 % (ref 50–65)
NITRITE UR QL STRIP.AUTO: NEGATIVE
PH UR STRIP.AUTO: 7 [PH] (ref 5–8)
PLATELET # BLD AUTO: 253 K/UL (ref 130–400)
PMV BLD AUTO: 10.5 FL (ref 9.4–12.3)
POTASSIUM SERPL-SCNC: 4.1 MMOL/L (ref 3.5–5)
PROT SERPL-MCNC: 7 G/DL (ref 6.6–8.7)
PROT UR STRIP.AUTO-MCNC: =>1000 MG/DL
RBC # BLD AUTO: 4.46 M/UL (ref 4.2–5.4)
RBC #/AREA URNS HPF: ABNORMAL /HPF (ref 0–2)
SODIUM SERPL-SCNC: 135 MMOL/L (ref 136–145)
SP GR UR STRIP.AUTO: 1.02 (ref 1–1.03)
SQUAMOUS #/AREA URNS HPF: ABNORMAL /HPF
UROBILINOGEN UR STRIP.AUTO-MCNC: 1 E.U./DL
WBC # BLD AUTO: 11.7 K/UL (ref 4.8–10.8)
WBC #/AREA URNS HPF: ABNORMAL /HPF (ref 0–5)

## 2023-07-17 PROCEDURE — 96361 HYDRATE IV INFUSION ADD-ON: CPT

## 2023-07-17 PROCEDURE — 36415 COLL VENOUS BLD VENIPUNCTURE: CPT

## 2023-07-17 PROCEDURE — 81001 URINALYSIS AUTO W/SCOPE: CPT

## 2023-07-17 PROCEDURE — 96360 HYDRATION IV INFUSION INIT: CPT

## 2023-07-17 PROCEDURE — 99213 OFFICE O/P EST LOW 20 MIN: CPT

## 2023-07-17 PROCEDURE — 85025 COMPLETE CBC W/AUTO DIFF WBC: CPT

## 2023-07-17 PROCEDURE — 2580000003 HC RX 258: Performed by: OBSTETRICS & GYNECOLOGY

## 2023-07-17 PROCEDURE — 80053 COMPREHEN METABOLIC PANEL: CPT

## 2023-07-17 RX ORDER — SODIUM CHLORIDE 9 MG/ML
INJECTION, SOLUTION INTRAVENOUS ONCE
Status: COMPLETED | OUTPATIENT
Start: 2023-07-17 | End: 2023-07-17

## 2023-07-17 RX ORDER — NITROFURANTOIN 25; 75 MG/1; MG/1
100 CAPSULE ORAL 2 TIMES DAILY
Qty: 14 CAPSULE | Refills: 0 | Status: ON HOLD | OUTPATIENT
Start: 2023-07-17 | End: 2023-07-24

## 2023-07-17 RX ORDER — DEXTROSE, SODIUM CHLORIDE, SODIUM LACTATE, POTASSIUM CHLORIDE, AND CALCIUM CHLORIDE 5; .6; .31; .03; .02 G/100ML; G/100ML; G/100ML; G/100ML; G/100ML
INJECTION, SOLUTION INTRAVENOUS CONTINUOUS
Status: DISCONTINUED | OUTPATIENT
Start: 2023-07-17 | End: 2023-07-17 | Stop reason: HOSPADM

## 2023-07-17 RX ADMIN — SODIUM CHLORIDE, SODIUM LACTATE, POTASSIUM CHLORIDE, CALCIUM CHLORIDE AND DEXTROSE MONOHYDRATE: 5; 600; 310; 30; 20 INJECTION, SOLUTION INTRAVENOUS at 14:07

## 2023-07-17 RX ADMIN — SODIUM CHLORIDE: 9 INJECTION, SOLUTION INTRAVENOUS at 13:05

## 2023-07-17 NOTE — H&P
History and Physical    Patient's Name/Date of Birth: Tigist Wiggins / 1990, (28 y.o.), female      Date: 2023     Chief Complaint: rule out labor    HPI:   27 yo  at 36w3d, sent from [facility] due to possible labor, on arrival decreased variability noted. Pt is being observed for chronic htn and possible superimposed preeclampsia. Pt denies any problems, no uti uri cns or covid symptom, pr is a chronic htn on baby aspirin,  procardiaxl 30 mgs,  previous c/s , desires     Past Medical History:   Diagnosis Date    Trigeminal neuralgia     Right       Past Surgical History:   Procedure Laterality Date     SECTION  2018    FACIAL NERVE SURGERY      radiosurgery for trigeminal neuralgia    TONSILLECTOMY AND ADENOIDECTOMY      TRIGEMINAL NERVE DECOMPRESSION      WISDOM TOOTH EXTRACTION         Current Facility-Administered Medications   Medication Dose Route Frequency Provider Last Rate Last Admin    dextrose 5 % in lactated ringers infusion   IntraVENous Continuous Marshall Mascorro  mL/hr at 23 1407 New Bag at 23 1407       Allergies   Allergen Reactions    Ibuprofen Other (See Comments)     MAKES THROAT BURN       History reviewed. No pertinent family history.     Social History     Socioeconomic History    Marital status:      Spouse name: Not on file    Number of children: Not on file    Years of education: Not on file    Highest education level: Not on file   Occupational History    Not on file   Tobacco Use    Smoking status: Never    Smokeless tobacco: Never   Vaping Use    Vaping Use: Never used   Substance and Sexual Activity    Alcohol use: No     Alcohol/week: 0.0 standard drinks    Drug use: No    Sexual activity: Yes     Partners: Male   Other Topics Concern    Not on file   Social History Narrative    Not on file     Social Determinants of Health     Financial Resource Strain: Not on file   Food Insecurity: Not on file   Transportation Needs: Not on

## 2023-07-17 NOTE — DISCHARGE INSTRUCTIONS

## 2023-07-19 ENCOUNTER — HOSPITAL ENCOUNTER (OUTPATIENT)
Age: 33
Discharge: HOME | End: 2023-07-19
Attending: OBSTETRICS & GYNECOLOGY | Admitting: OBSTETRICS & GYNECOLOGY
Payer: COMMERCIAL

## 2023-07-19 ENCOUNTER — TELEPHONE (OUTPATIENT)
Dept: OBGYN CLINIC | Age: 33
End: 2023-07-19

## 2023-07-19 VITALS
SYSTOLIC BLOOD PRESSURE: 133 MMHG | DIASTOLIC BLOOD PRESSURE: 84 MMHG | HEART RATE: 94 BPM | RESPIRATION RATE: 19 BRPM | TEMPERATURE: 97.6 F | OXYGEN SATURATION: 100 %

## 2023-07-19 PROBLEM — Z3A.36 36 WEEKS GESTATION OF PREGNANCY: Status: ACTIVE | Noted: 2023-07-19

## 2023-07-19 PROCEDURE — 99211 OFF/OP EST MAY X REQ PHY/QHP: CPT

## 2023-07-19 NOTE — TELEPHONE ENCOUNTER
Patient called in with c/o not feeling baby move today. Patient has tried hydrating, caffeine, and sugary foods.  Instructed to go to L&D

## 2023-07-19 NOTE — DISCHARGE INSTRUCTIONS
LABOR AND DELIVERY - OBSERVATION DISCHARGE INSTRUCTIONS    TERM LABOR: RETURN TO HOSPITAL IF:  __There is a leaking or sudden gush of fluid from the vagina (note color, odor, time and amount of fluid)    __ You have bright red vaginal bleeding    __You begin to have regular contractions, occuring every 2-5 minutes, each contraction lasting 45-60 seconds for one hour. Time contractions from beginning of one to the beginning of the next. True contractions have a regular rate and become progressively closer. They are not changed by position change and are usually more uncomfortable when walking. PRE-TERM LABOR  __Your gestational age is 42 weeks: term pregnancy starts at 42 weeks. __Fill your prescription and take as directed. __Bed rest (left lying position is best). __Refrain from sexual intercourse until you see your physician again. __No heavy lifting or strenuous activity. RETURN TO HOSPITAL IF:  __Contractions occur 3-4 in any hour (every 10-15 minutes), maybe with or without pain. __Rhythmic or constant menstrual-like cramps  __Rhythmic or constant lower, dull backache may radiate to the sides or front. Increase or change in vaginal discharge, may be watery, pink, red or brown-tinged. PRE-ECLAMPSIA  __Bed rest, left side lying position  __Elevate legs while sitting  __Maintain quiet, peaceful environment  __Eat well-balanced meals, no added salt, avoid processed lunch meats, canned soups, potato chips, etc.    SYMPTOMS THAT REQUIRE PROMPT REPORTING:  __Rapid gain in weight  __Persistent or severe headache  __Visual disturbances (spots, blurred)  __Nausea/vomiting, with or without upper abdominal pain.   __Increase or persistent swelling (face puffiness, hands, legs, ankles)  __Decreased urinary output  __Drowsiness listlessness    NAUSEA/VOMITING HYPEREMESIS  __Eat small, frequent meals instead of three large meals  __Drink liquids (such as 7-up or ginger ale) between meals instead of with meals  __Eat a few crackers or toast before getting out of bed in the morning    URINARY TRACT INFECTION  __Fill your prescription and take as directed  __Drink 8-10 glasses of water, juice or decaffeinated beverages a day. __Take two regular strength Tylenol every 4 hours as needed for pain or fever (NO ASPIRIN PRODUCTS)  __Cleanse vaginal area from front to back  __Completely empty bladder and avoid postponing urination  __Notify your physician of elevated temperature      LOW LYING/MARGINAL PLACENTA PREVIA  __No sexual intercourse  __No douching or tampon use  __No heavy lifting or strenuous activity  __No long trips without physician's consent  __Limited activity for __________________    RETURN TO HOSPITAL IMMEDIATELY IF:  __Vaginal bleeding, usually bright red and painless. May be intermittent, may come in gushes or continuous.     ABDOMINAL POST-TRAUMA INSTRUCTIONS  __Limited activity for __hours  __It is important to note baby activity, evidence on pre-term labor or bleeding and tight feeling abdomen  __If you notice a decrease in fetal movement , notify your physician  __Return to hospital IMMEDIATELY if vaginal bleeding starts, abdominal tenderness, or pain, a rigid/board-like appearance of abdomen    OTHER INSTRUCTIONS  __Make an appointment to see your attending physician for ***  __After today's visit, you will need to return to registration and pre register for your next visit     NOTE: If you do not begin to feel better, or you have any questions, contact your physician or call [phone], or return to the hospital.

## 2023-07-19 NOTE — FLOWSHEET NOTE
Pt arrived to unit at 36w3d with reports of decreased FM. Pt reports she felt baby move twice last night but has not felt baby move since. Pt reports she is being seen and treated for [facility] by Dr. Joshua Garg and takes procardia. Pt also reports recent BPP of 6/8 and states she is being treated for a UTI with macrobid. Pt denies ctxs, LOF and VB. Pt educated on importance of coming in earlier if kick counts are less than 10 in one hour. Pt verbalized understanding. Pt changed into gown and urine obtained. EFM applied with baseline FHR tracing at 140 with moderate variability and accels noted. TOCO appplied to soft, non tender abdomen. Will continue to monitor.

## 2023-07-19 NOTE — FLOWSHEET NOTE
Dr. James Hernadez notified of pt. New orders to discharge pt home and to keep follow up appointment tomorrow.

## 2023-07-20 ENCOUNTER — HOSPITAL ENCOUNTER (OUTPATIENT)
Age: 33
Discharge: HOME | End: 2023-07-20
Attending: ADVANCED PRACTICE MIDWIFE | Admitting: ADVANCED PRACTICE MIDWIFE
Payer: COMMERCIAL

## 2023-07-20 ENCOUNTER — ROUTINE PRENATAL (OUTPATIENT)
Dept: OBGYN CLINIC | Age: 33
End: 2023-07-20

## 2023-07-20 VITALS
DIASTOLIC BLOOD PRESSURE: 103 MMHG | TEMPERATURE: 98.2 F | BODY MASS INDEX: 44.43 KG/M2 | HEART RATE: 95 BPM | SYSTOLIC BLOOD PRESSURE: 134 MMHG | WEIGHT: 267 LBS

## 2023-07-20 VITALS — HEART RATE: 99 BPM | DIASTOLIC BLOOD PRESSURE: 90 MMHG | SYSTOLIC BLOOD PRESSURE: 139 MMHG | TEMPERATURE: 98.4 F

## 2023-07-20 DIAGNOSIS — O36.8390 FETAL TACHYCARDIA AFFECTING MANAGEMENT OF MOTHER: ICD-10-CM

## 2023-07-20 DIAGNOSIS — Z34.93 PRENATAL CARE IN THIRD TRIMESTER: Primary | ICD-10-CM

## 2023-07-20 DIAGNOSIS — O34.219 HISTORY OF CESAREAN DELIVERY, CURRENTLY PREGNANT: ICD-10-CM

## 2023-07-20 DIAGNOSIS — O99.210 OBESITY AFFECTING PREGNANCY, ANTEPARTUM: ICD-10-CM

## 2023-07-20 DIAGNOSIS — O16.3 HYPERTENSION AFFECTING PREGNANCY IN THIRD TRIMESTER: ICD-10-CM

## 2023-07-20 PROCEDURE — 99212 OFFICE O/P EST SF 10 MIN: CPT

## 2023-07-20 NOTE — H&P
History and Physical    Patient's Name/Date of Birth: Dominick Jean / 1990, (28 y.o.), female    Date: 2023     Chief Complaint: sent from office for further observation    HPI:   27 yo  at 36 weeks, previous c/s, chronic htn on meds, was sent from office due to tachycardia. Pt reports good mov, no bleeding, no contractions,, no uti uri cns or covid symptoms. Prenatal care positive for htn on procardia, normal growth so far , planning  if time allows     Past Medical History:   Diagnosis Date    PCOS (polycystic ovarian syndrome)     Trigeminal neuralgia     Right       Past Surgical History:   Procedure Laterality Date     SECTION  2018    FACIAL NERVE SURGERY      radiosurgery for trigeminal neuralgia    TONSILLECTOMY AND ADENOIDECTOMY      TRIGEMINAL NERVE DECOMPRESSION      WISDOM TOOTH EXTRACTION         No current facility-administered medications for this encounter. Allergies   Allergen Reactions    Ibuprofen Other (See Comments)     MAKES THROAT BURN       History reviewed. No pertinent family history.     Social History     Socioeconomic History    Marital status:      Spouse name: Not on file    Number of children: Not on file    Years of education: Not on file    Highest education level: Not on file   Occupational History    Not on file   Tobacco Use    Smoking status: Never    Smokeless tobacco: Never   Vaping Use    Vaping Use: Never used   Substance and Sexual Activity    Alcohol use: No     Alcohol/week: 0.0 standard drinks    Drug use: No    Sexual activity: Yes     Partners: Male   Other Topics Concern    Not on file   Social History Narrative    Not on file     Social Determinants of Health     Financial Resource Strain: Not on file   Food Insecurity: Not on file   Transportation Needs: Not on file   Physical Activity: Not on file   Stress: Not on file   Social Connections: Not on file   Intimate Partner Violence: Not on file   Housing Stability:

## 2023-07-20 NOTE — DISCHARGE INSTRUCTIONS
LABOR AND DELIVERY - OBSERVATION DISCHARGE INSTRUCTIONS    TERM LABOR: RETURN TO HOSPITAL IF:  __There is a leaking or sudden gush of fluid from the vagina (note color, odor, time and amount of fluid)    __ You have bright red vaginal bleeding    __You begin to have regular contractions, occuring every *** minutes, each contraction lasting 45-60 seconds for one hour. Time contractions from beginning of one to the beginning of the next. True contractions have a regular rate and become progressively closer. They are not changed by position change and are usually more uncomfortable when walking. PRE-TERM LABOR  _x_Your gestational age is 45 3/5 weeks: term pregnancy starts at 42 weeks. __Fill your prescription and take as directed. __Bed rest (left lying position is best). __Refrain from sexual intercourse until you see your physician again. __No heavy lifting or strenuous activity. RETURN TO HOSPITAL IF:  _x_Contractions occur 3-4 in any hour (every 10-15 minutes), maybe with or without pain. _x_Rhythmic or constant menstrual-like cramps  _x_Rhythmic or constant lower, dull backache may radiate to the sides or front. Increase or change in vaginal discharge, may be watery, pink, red or brown-tinged. PRE-ECLAMPSIA  __Bed rest, left side lying position  _x_Elevate legs while sitting  __Maintain quiet, peaceful environment  _x_Eat well-balanced meals, no added salt, avoid processed lunch meats, canned soups, potato chips, etc.    SYMPTOMS THAT REQUIRE PROMPT REPORTING:  _x_Rapid gain in weight  _x_Persistent or severe headache  _x_Visual disturbances (spots, blurred)  _x_Nausea/vomiting, with or without upper abdominal pain.   _x_Increase or persistent swelling (face puffiness, hands, legs, ankles)  _x_Decreased urinary output  _x_Drowsiness listlessness        OTHER INSTRUCTIONS  _x_Keep appointment to see your attending physician       NOTE: If you do not begin to feel better, or you have any questions,

## 2023-07-20 NOTE — PROGRESS NOTES
[facility] Prenatal Office Note  Subjective:  Radha Armendariz is here for a return obstetrical visit. Today she is 36w4d weeks EGA. Pt does feel fetal movement regularly. Denies headaches, RUQ pain, or visual changes as well as contractions, vaginal bleeding or leaking of fluid. Problems/complaints today:  Patient was sent from [facility] on Monday to L&D due to 6/8 BPP  Patient went to L&D yesterday due to decreased fetal movement > 12 hours   Objective: Mother's Prenatal Vitals  BP: (!) 134/103  Weight - Scale: 267 lb (121.1 kg)  Pulse: 95  Patient Position: Sitting  Prenatal Fetal Information  Movement: Present  Pt is A&Ox3, in no acute distress. Normocephalic, atraumatic. PERRL. Resp even and non-labored. Skin pink, warm & dry. Gravid abdomen. PAL's well. Gait steady. Assessment:    IUP at 36w4d wks      Diagnosis Orders   1. Prenatal care in third trimester        2. Obesity affecting pregnancy, antepartum        3. Hypertension affecting pregnancy in third trimester        4. History of  delivery, currently pregnant        5. Fetal tachycardia affecting management of mother          Plan:  Problems/Complaints Management Plan:  NST - fetal tachycardia; baseline 170s, accelerations present. Cat 2 FHT  Send to L&D for evaluation  Routine OB Management Plan:  Pt counseled on balanced nutrition, adequate fluid intake, taking PNV daily, and exercise along with GHTN precautions, Kick count, and  labor  Continue with routine prenatal care.  surveillance indicated for gestational hypertension   RTC in 1 wks for prenatal visit    MEDICATIONS:  No orders of the defined types were placed in this encounter. ORDERS:  No orders of the defined types were placed in this encounter. More than 50% of this 20 min visit was education and counseling.

## 2023-07-20 NOTE — PROGRESS NOTES
Patient presents today for routine prenatal care. Pt denies any vaginal leaking bleeding or contractions. + Fetal movement. Monday Dr Domingo Romero sent her to labor and delivery. She was having strong contractions. Only scored 6/8 BPP on Monday. Yesterday she came to L&D for no fetal movement for 12 hours.

## 2023-07-21 ENCOUNTER — TELEPHONE (OUTPATIENT)
Dept: OBGYN CLINIC | Age: 33
End: 2023-07-21

## 2023-07-21 ENCOUNTER — HOSPITAL ENCOUNTER (OUTPATIENT)
Age: 33
Discharge: HOME | End: 2023-07-21
Attending: NURSE PRACTITIONER | Admitting: NURSE PRACTITIONER
Payer: COMMERCIAL

## 2023-07-21 VITALS — DIASTOLIC BLOOD PRESSURE: 87 MMHG | HEART RATE: 101 BPM | SYSTOLIC BLOOD PRESSURE: 125 MMHG | TEMPERATURE: 98 F

## 2023-07-21 DIAGNOSIS — O16.3 HYPERTENSION AFFECTING PREGNANCY IN THIRD TRIMESTER: ICD-10-CM

## 2023-07-21 DIAGNOSIS — Z34.93 PRENATAL CARE IN THIRD TRIMESTER: Primary | ICD-10-CM

## 2023-07-21 DIAGNOSIS — O99.210 OBESITY AFFECTING PREGNANCY, ANTEPARTUM: ICD-10-CM

## 2023-07-21 LAB
ALBUMIN SERPL-MCNC: 3.4 G/DL (ref 3.5–5.2)
ALP SERPL-CCNC: 119 U/L (ref 35–104)
ALT SERPL-CCNC: 19 U/L (ref 5–33)
ANION GAP SERPL CALCULATED.3IONS-SCNC: 15 MMOL/L (ref 7–19)
AST SERPL-CCNC: 14 U/L (ref 5–32)
BILIRUB SERPL-MCNC: 0.3 MG/DL (ref 0.2–1.2)
BUN SERPL-MCNC: 8 MG/DL (ref 6–20)
CALCIUM SERPL-MCNC: 9.1 MG/DL (ref 8.6–10)
CHLORIDE SERPL-SCNC: 104 MMOL/L (ref 98–111)
CO2 SERPL-SCNC: 18 MMOL/L (ref 22–29)
CREAT SERPL-MCNC: 0.7 MG/DL (ref 0.5–0.9)
CREAT UR-MCNC: 126.1 MG/DL (ref 28–217)
ERYTHROCYTE [DISTWIDTH] IN BLOOD BY AUTOMATED COUNT: 14.6 % (ref 11.5–14.5)
GLUCOSE SERPL-MCNC: 94 MG/DL (ref 74–109)
HCT VFR BLD AUTO: 36.9 % (ref 37–47)
HGB BLD-MCNC: 12.7 G/DL (ref 12–16)
MCH RBC QN AUTO: 28.9 PG (ref 27–31)
MCHC RBC AUTO-ENTMCNC: 34.4 G/DL (ref 33–37)
MCV RBC AUTO: 83.9 FL (ref 81–99)
PLATELET # BLD AUTO: 244 K/UL (ref 130–400)
PMV BLD AUTO: 10.9 FL (ref 9.4–12.3)
POTASSIUM SERPL-SCNC: 4 MMOL/L (ref 3.5–5)
PROT SERPL-MCNC: 6.9 G/DL (ref 6.6–8.7)
PROT UR-MCNC: 19 MG/DL (ref 15–45)
RBC # BLD AUTO: 4.4 M/UL (ref 4.2–5.4)
SODIUM SERPL-SCNC: 137 MMOL/L (ref 136–145)
WBC # BLD AUTO: 10.5 K/UL (ref 4.8–10.8)

## 2023-07-21 PROCEDURE — 85027 COMPLETE CBC AUTOMATED: CPT

## 2023-07-21 PROCEDURE — 82570 ASSAY OF URINE CREATININE: CPT

## 2023-07-21 PROCEDURE — 99212 OFFICE O/P EST SF 10 MIN: CPT

## 2023-07-21 PROCEDURE — 84156 ASSAY OF PROTEIN URINE: CPT

## 2023-07-21 PROCEDURE — 6370000000 HC RX 637 (ALT 250 FOR IP): Performed by: NURSE PRACTITIONER

## 2023-07-21 PROCEDURE — 36415 COLL VENOUS BLD VENIPUNCTURE: CPT

## 2023-07-21 PROCEDURE — 80053 COMPREHEN METABOLIC PANEL: CPT

## 2023-07-21 RX ORDER — NIFEDIPINE 30 MG/1
30 TABLET, EXTENDED RELEASE ORAL
Status: COMPLETED | OUTPATIENT
Start: 2023-07-21 | End: 2023-07-21

## 2023-07-21 RX ADMIN — NIFEDIPINE 30 MG: 30 TABLET, EXTENDED RELEASE ORAL at 10:31

## 2023-07-21 NOTE — FLOWSHEET NOTE
D/c instructions/high bp precautions and 24 hour urine jug given to pt and she verbalized understanding. No questions.  Pt ambulated from L&D

## 2023-07-21 NOTE — DISCHARGE INSTRUCTIONS
LABOR AND DELIVERY - OBSERVATION DISCHARGE INSTRUCTIONS    TERM LABOR: RETURN TO HOSPITAL IF:  __There is a leaking or sudden gush of fluid from the vagina (note color, odor, time and amount of fluid)    __ You have bright red vaginal bleeding    __You begin to have regular contractions, occuring every *** minutes, each contraction lasting 45-60 seconds for one hour. Time contractions from beginning of one to the beginning of the next. True contractions have a regular rate and become progressively closer. They are not changed by position change and are usually more uncomfortable when walking. PRE-TERM LABOR  __Your gestational age is *** weeks: term pregnancy starts at 37 weeks. __Fill your prescription and take as directed. __Bed rest (left lying position is best). __Refrain from sexual intercourse until you see your physician again. __No heavy lifting or strenuous activity. RETURN TO HOSPITAL IF:  __Contractions occur 3-4 in any hour (every 10-15 minutes), maybe with or without pain. __Rhythmic or constant menstrual-like cramps  __Rhythmic or constant lower, dull backache may radiate to the sides or front. Increase or change in vaginal discharge, may be watery, pink, red or brown-tinged. PRE-ECLAMPSIA  __Bed rest, left side lying position  __Elevate legs while sitting  __Maintain quiet, peaceful environment  __Eat well-balanced meals, no added salt, avoid processed lunch meats, canned soups, potato chips, etc.    SYMPTOMS THAT REQUIRE PROMPT REPORTING:  __Rapid gain in weight  __Persistent or severe headache  __Visual disturbances (spots, blurred)  __Nausea/vomiting, with or without upper abdominal pain.   __Increase or persistent swelling (face puffiness, hands, legs, ankles)  __Decreased urinary output  __Drowsiness listlessness    NAUSEA/VOMITING HYPEREMESIS  __Eat small, frequent meals instead of three large meals  __Drink liquids (such as 7-up or ginger ale) between meals instead of with meals  __Eat a few crackers or toast before getting out of bed in the morning    URINARY TRACT INFECTION  __Fill your prescription and take as directed  __Drink 8-10 glasses of water, juice or decaffeinated beverages a day. __Take two regular strength Tylenol every 4 hours as needed for pain or fever (NO ASPIRIN PRODUCTS)  __Cleanse vaginal area from front to back  __Completely empty bladder and avoid postponing urination  __Notify your physician of elevated temperature      LOW LYING/MARGINAL PLACENTA PREVIA  __No sexual intercourse  __No douching or tampon use  __No heavy lifting or strenuous activity  __No long trips without physician's consent  __Limited activity for __________________    RETURN TO HOSPITAL IMMEDIATELY IF:  __Vaginal bleeding, usually bright red and painless. May be intermittent, may come in gushes or continuous.     ABDOMINAL POST-TRAUMA INSTRUCTIONS  __Limited activity for __hours  __It is important to note baby activity, evidence on pre-term labor or bleeding and tight feeling abdomen  __If you notice a decrease in fetal movement , notify your physician  __Return to hospital IMMEDIATELY if vaginal bleeding starts, abdominal tenderness, or pain, a rigid/board-like appearance of abdomen    OTHER INSTRUCTIONS  _  __After today's visit, you will need to return to registration and pre register for your next visit     NOTE: If you do not begin to feel better, or you have any questions, contact your physician or call [phone], or return to the hospital.

## 2023-07-21 NOTE — TELEPHONE ENCOUNTER
Pt called and stated she has a headache took some tylenol and had a little relief her BP this morning was 148/110 at  6:30am then after her readings were 144/98 then 152/98 per Ephriam Lax for pt to go to L&D pt verbalized understanding.

## 2023-07-21 NOTE — FLOWSHEET NOTE
Pt ambulated to L&D with c/o elevated bps. Pt reports +FM. Pt denies lof,vaginal bleeding or reg ctx. Pt states she did forget to take her procardia xl today. Vs taken and serials set. Efm and toco applied to soft non tender abdomen. Zuleyma Wagner called and orders in. Will monitor closely.

## 2023-07-22 ENCOUNTER — HOSPITAL ENCOUNTER (INPATIENT)
Age: 33
LOS: 3 days | Discharge: HOME | End: 2023-07-26
Attending: OBSTETRICS & GYNECOLOGY | Admitting: OBSTETRICS & GYNECOLOGY
Payer: COMMERCIAL

## 2023-07-22 DIAGNOSIS — O16.3 HYPERTENSION AFFECTING PREGNANCY IN THIRD TRIMESTER: ICD-10-CM

## 2023-07-22 LAB
ALBUMIN SERPL-MCNC: 3.4 G/DL (ref 3.5–5.2)
ALP SERPL-CCNC: 111 U/L (ref 35–104)
ALT SERPL-CCNC: 14 U/L (ref 5–33)
ANION GAP SERPL CALCULATED.3IONS-SCNC: 13 MMOL/L (ref 7–19)
AST SERPL-CCNC: 11 U/L (ref 5–32)
BILIRUB SERPL-MCNC: <0.2 MG/DL (ref 0.2–1.2)
BUN SERPL-MCNC: 8 MG/DL (ref 6–20)
CALCIUM SERPL-MCNC: 9.2 MG/DL (ref 8.6–10)
CHLORIDE SERPL-SCNC: 103 MMOL/L (ref 98–111)
CO2 SERPL-SCNC: 19 MMOL/L (ref 22–29)
CREAT SERPL-MCNC: 0.7 MG/DL (ref 0.5–0.9)
ERYTHROCYTE [DISTWIDTH] IN BLOOD BY AUTOMATED COUNT: 14.5 % (ref 11.5–14.5)
GLUCOSE SERPL-MCNC: 138 MG/DL (ref 74–109)
HCT VFR BLD AUTO: 35.7 % (ref 37–47)
HGB BLD-MCNC: 12.2 G/DL (ref 12–16)
MCH RBC QN AUTO: 28.7 PG (ref 27–31)
MCHC RBC AUTO-ENTMCNC: 34.2 G/DL (ref 33–37)
MCV RBC AUTO: 84 FL (ref 81–99)
PLATELET # BLD AUTO: 244 K/UL (ref 130–400)
PMV BLD AUTO: 10.4 FL (ref 9.4–12.3)
POTASSIUM SERPL-SCNC: 3.5 MMOL/L (ref 3.5–5)
PROT SERPL-MCNC: 6.5 G/DL (ref 6.6–8.7)
RBC # BLD AUTO: 4.25 M/UL (ref 4.2–5.4)
SODIUM SERPL-SCNC: 135 MMOL/L (ref 136–145)
URATE SERPL-MCNC: 5.1 MG/DL (ref 2.4–5.7)
WBC # BLD AUTO: 9.8 K/UL (ref 4.8–10.8)

## 2023-07-22 PROCEDURE — 84550 ASSAY OF BLOOD/URIC ACID: CPT

## 2023-07-22 PROCEDURE — 85027 COMPLETE CBC AUTOMATED: CPT

## 2023-07-22 PROCEDURE — 36415 COLL VENOUS BLD VENIPUNCTURE: CPT

## 2023-07-22 PROCEDURE — 80053 COMPREHEN METABOLIC PANEL: CPT

## 2023-07-22 PROCEDURE — 99212 OFFICE O/P EST SF 10 MIN: CPT

## 2023-07-22 PROCEDURE — G0378 HOSPITAL OBSERVATION PER HR: HCPCS

## 2023-07-22 RX ORDER — PRENATAL VIT/IRON FUM/FOLIC AC 27MG-0.8MG
1 TABLET ORAL DAILY
Status: DISCONTINUED | OUTPATIENT
Start: 2023-07-23 | End: 2023-07-26 | Stop reason: HOSPADM

## 2023-07-22 RX ORDER — NIFEDIPINE 30 MG/1
30 TABLET, EXTENDED RELEASE ORAL DAILY
Status: DISCONTINUED | OUTPATIENT
Start: 2023-07-23 | End: 2023-07-26 | Stop reason: HOSPADM

## 2023-07-22 ASSESSMENT — PAIN DESCRIPTION - DESCRIPTORS: DESCRIPTORS: ACHING

## 2023-07-22 ASSESSMENT — PAIN DESCRIPTION - LOCATION: LOCATION: HEAD

## 2023-07-22 ASSESSMENT — PAIN SCALES - GENERAL: PAINLEVEL_OUTOF10: 2

## 2023-07-22 NOTE — FLOWSHEET NOTE
Pt ambulated to L&D with c/o elevated bps at home and a headache since yesterday. Pt reports +FM. Pt denies lof,vaginal bleeding or reg ctx. Pt denies epigastric pain or visual changes. Serial bps set, efm and toco applied to soft non tender abdomen.  Will monitor closely and notify Dr Juan Moya per pt request.

## 2023-07-22 NOTE — FLOWSHEET NOTE
Dr Ángel Fox given report on pt vs and labs. Pt to stay in obs bed and will have a repeat cs on Monday. Orders in computer and will notify pt and move to room 222.

## 2023-07-23 ENCOUNTER — APPOINTMENT (OUTPATIENT)
Dept: ULTRASOUND IMAGING | Age: 33
End: 2023-07-23
Payer: COMMERCIAL

## 2023-07-23 PROBLEM — Z3A.37 37 WEEKS GESTATION OF PREGNANCY: Status: ACTIVE | Noted: 2023-07-23

## 2023-07-23 LAB
ABO + RH BLD: NORMAL
AMPHET UR QL SCN: NEGATIVE
BARBITURATES UR QL SCN: NEGATIVE
BENZODIAZ UR QL SCN: NEGATIVE
BLD GP AB SCN SERPL QL: NORMAL
BUPRENORPHINE URINE: NEGATIVE
CANNABINOIDS UR QL SCN: NEGATIVE
COCAINE UR QL SCN: NEGATIVE
CREAT 24H UR-MRATE: 1.8 G/24HR (ref 1–2)
DRUG SCREEN COMMENT UR-IMP: NORMAL
ERYTHROCYTE [DISTWIDTH] IN BLOOD BY AUTOMATED COUNT: 14.6 % (ref 11.5–14.5)
HCT VFR BLD AUTO: 35.1 % (ref 37–47)
HGB BLD-MCNC: 11.8 G/DL (ref 12–16)
Lab: 24 HOURS
MCH RBC QN AUTO: 28.4 PG (ref 27–31)
MCHC RBC AUTO-ENTMCNC: 33.6 G/DL (ref 33–37)
MCV RBC AUTO: 84.4 FL (ref 81–99)
METHADONE UR QL SCN: NEGATIVE
METHAMPHETAMINE, URINE: NEGATIVE
OPIATES UR QL SCN: NEGATIVE
OXYCODONE UR QL SCN: NEGATIVE
PCP UR QL SCN: NEGATIVE
PLATELET # BLD AUTO: 207 K/UL (ref 130–400)
PMV BLD AUTO: 10.4 FL (ref 9.4–12.3)
PROPOXYPH UR QL SCN: NEGATIVE
PROT 24H UR-MRATE: 149 MG/24HR (ref 50–100)
RBC # BLD AUTO: 4.16 M/UL (ref 4.2–5.4)
SPECIMEN VOL 24H UR: 1650 ML
TRICYCLIC, URINE: NEGATIVE
WBC # BLD AUTO: 9 K/UL (ref 4.8–10.8)

## 2023-07-23 PROCEDURE — 85027 COMPLETE CBC AUTOMATED: CPT

## 2023-07-23 PROCEDURE — 36415 COLL VENOUS BLD VENIPUNCTURE: CPT

## 2023-07-23 PROCEDURE — 99223 1ST HOSP IP/OBS HIGH 75: CPT | Performed by: OBSTETRICS & GYNECOLOGY

## 2023-07-23 PROCEDURE — 59025 FETAL NON-STRESS TEST: CPT

## 2023-07-23 PROCEDURE — 6370000000 HC RX 637 (ALT 250 FOR IP): Performed by: OBSTETRICS & GYNECOLOGY

## 2023-07-23 PROCEDURE — 86850 RBC ANTIBODY SCREEN: CPT

## 2023-07-23 PROCEDURE — 86592 SYPHILIS TEST NON-TREP QUAL: CPT

## 2023-07-23 PROCEDURE — 86900 BLOOD TYPING SEROLOGIC ABO: CPT

## 2023-07-23 PROCEDURE — 1220000000 HC SEMI PRIVATE OB R&B

## 2023-07-23 PROCEDURE — 80306 DRUG TEST PRSMV INSTRMNT: CPT

## 2023-07-23 PROCEDURE — 76819 FETAL BIOPHYS PROFIL W/O NST: CPT

## 2023-07-23 PROCEDURE — 86901 BLOOD TYPING SEROLOGIC RH(D): CPT

## 2023-07-23 RX ORDER — ACETAMINOPHEN 500 MG
1000 TABLET ORAL EVERY 6 HOURS PRN
Status: DISCONTINUED | OUTPATIENT
Start: 2023-07-23 | End: 2023-07-24

## 2023-07-23 RX ORDER — SODIUM CHLORIDE 0.9 % (FLUSH) 0.9 %
10 SYRINGE (ML) INJECTION EVERY 12 HOURS SCHEDULED
Status: DISCONTINUED | OUTPATIENT
Start: 2023-07-23 | End: 2023-07-26 | Stop reason: HOSPADM

## 2023-07-23 RX ORDER — TRISODIUM CITRATE DIHYDRATE AND CITRIC ACID MONOHYDRATE 500; 334 MG/5ML; MG/5ML
30 SOLUTION ORAL ONCE
Status: DISCONTINUED | OUTPATIENT
Start: 2023-07-23 | End: 2023-07-26 | Stop reason: HOSPADM

## 2023-07-23 RX ORDER — SODIUM CHLORIDE 0.9 % (FLUSH) 0.9 %
10 SYRINGE (ML) INJECTION PRN
Status: DISCONTINUED | OUTPATIENT
Start: 2023-07-23 | End: 2023-07-26 | Stop reason: HOSPADM

## 2023-07-23 RX ORDER — SODIUM CHLORIDE, SODIUM LACTATE, POTASSIUM CHLORIDE, CALCIUM CHLORIDE 600; 310; 30; 20 MG/100ML; MG/100ML; MG/100ML; MG/100ML
INJECTION, SOLUTION INTRAVENOUS CONTINUOUS
Status: DISCONTINUED | OUTPATIENT
Start: 2023-07-23 | End: 2023-07-26 | Stop reason: HOSPADM

## 2023-07-23 RX ORDER — SODIUM CHLORIDE, SODIUM LACTATE, POTASSIUM CHLORIDE, AND CALCIUM CHLORIDE .6; .31; .03; .02 G/100ML; G/100ML; G/100ML; G/100ML
1000 INJECTION, SOLUTION INTRAVENOUS ONCE
Status: DISCONTINUED | OUTPATIENT
Start: 2023-07-23 | End: 2023-07-26 | Stop reason: HOSPADM

## 2023-07-23 RX ORDER — SODIUM CHLORIDE 9 MG/ML
INJECTION, SOLUTION INTRAVENOUS PRN
Status: DISCONTINUED | OUTPATIENT
Start: 2023-07-23 | End: 2023-07-26 | Stop reason: HOSPADM

## 2023-07-23 RX ADMIN — ACETAMINOPHEN 1000 MG: 500 TABLET ORAL at 22:02

## 2023-07-23 RX ADMIN — PRENATAL VIT W/ FE FUMARATE-FA TAB 27-0.8 MG 1 TABLET: 27-0.8 TAB at 11:00

## 2023-07-23 RX ADMIN — NIFEDIPINE 30 MG: 30 TABLET, FILM COATED, EXTENDED RELEASE ORAL at 11:00

## 2023-07-23 ASSESSMENT — PAIN - FUNCTIONAL ASSESSMENT: PAIN_FUNCTIONAL_ASSESSMENT: ACTIVITIES ARE NOT PREVENTED

## 2023-07-23 ASSESSMENT — PAIN DESCRIPTION - DESCRIPTORS: DESCRIPTORS: ACHING;THROBBING

## 2023-07-23 ASSESSMENT — PAIN DESCRIPTION - LOCATION: LOCATION: HEAD

## 2023-07-23 ASSESSMENT — PAIN SCALES - GENERAL: PAINLEVEL_OUTOF10: 3

## 2023-07-23 NOTE — FLOWSHEET NOTE
Assumed care of pt. Pt sitting up in bed watching tv. Tolerating regular diet, voiding and ambulates without difficulty. Assessment completed, stable. States she has a slight headache presently but that her  is bringing her some food. Denies visual disturbances or epigastric pain. DTR's 2+/ 0 clonus. Denies needs at this time.

## 2023-07-23 NOTE — FLOWSHEET NOTE
Pt laying in bed on left side. Significant other resting in room with pt. Pt states headache is better after eating. VSS. Denies needs at this time.

## 2023-07-23 NOTE — FLOWSHEET NOTE
Dr Woods Viry here to see pt. Pt sleeping when entered room. No new orders received from MD at this time. Pt denies pain or needs.

## 2023-07-24 ENCOUNTER — ANESTHESIA EVENT (OUTPATIENT)
Dept: LABOR AND DELIVERY | Age: 33
End: 2023-07-24
Payer: COMMERCIAL

## 2023-07-24 ENCOUNTER — ANESTHESIA (OUTPATIENT)
Dept: LABOR AND DELIVERY | Age: 33
End: 2023-07-24
Payer: COMMERCIAL

## 2023-07-24 LAB — RPR SER QL: NORMAL

## 2023-07-24 PROCEDURE — 3609079900 HC CESAREAN SECTION: Performed by: OBSTETRICS & GYNECOLOGY

## 2023-07-24 PROCEDURE — 3700000000 HC ANESTHESIA ATTENDED CARE: Performed by: OBSTETRICS & GYNECOLOGY

## 2023-07-24 PROCEDURE — 7100000000 HC PACU RECOVERY - FIRST 15 MIN: Performed by: OBSTETRICS & GYNECOLOGY

## 2023-07-24 PROCEDURE — 2709999900 HC NON-CHARGEABLE SUPPLY: Performed by: OBSTETRICS & GYNECOLOGY

## 2023-07-24 PROCEDURE — 3700000001 HC ADD 15 MINUTES (ANESTHESIA): Performed by: OBSTETRICS & GYNECOLOGY

## 2023-07-24 PROCEDURE — 59510 CESAREAN DELIVERY: CPT | Performed by: OBSTETRICS & GYNECOLOGY

## 2023-07-24 PROCEDURE — 6360000002 HC RX W HCPCS: Performed by: OBSTETRICS & GYNECOLOGY

## 2023-07-24 PROCEDURE — 6360000002 HC RX W HCPCS: Performed by: NURSE ANESTHETIST, CERTIFIED REGISTERED

## 2023-07-24 PROCEDURE — 6370000000 HC RX 637 (ALT 250 FOR IP): Performed by: OBSTETRICS & GYNECOLOGY

## 2023-07-24 PROCEDURE — 2580000003 HC RX 258: Performed by: OBSTETRICS & GYNECOLOGY

## 2023-07-24 PROCEDURE — 1220000000 HC SEMI PRIVATE OB R&B

## 2023-07-24 PROCEDURE — C1765 ADHESION BARRIER: HCPCS | Performed by: OBSTETRICS & GYNECOLOGY

## 2023-07-24 PROCEDURE — 2500000003 HC RX 250 WO HCPCS: Performed by: NURSE ANESTHETIST, CERTIFIED REGISTERED

## 2023-07-24 PROCEDURE — 2500000003 HC RX 250 WO HCPCS: Performed by: OBSTETRICS & GYNECOLOGY

## 2023-07-24 PROCEDURE — 7100000001 HC PACU RECOVERY - ADDTL 15 MIN: Performed by: OBSTETRICS & GYNECOLOGY

## 2023-07-24 PROCEDURE — 2720000010 HC SURG SUPPLY STERILE: Performed by: OBSTETRICS & GYNECOLOGY

## 2023-07-24 RX ORDER — HYDROMORPHONE HYDROCHLORIDE 1 MG/ML
0.25 INJECTION, SOLUTION INTRAMUSCULAR; INTRAVENOUS; SUBCUTANEOUS
Status: DISCONTINUED | OUTPATIENT
Start: 2023-07-24 | End: 2023-07-26 | Stop reason: HOSPADM

## 2023-07-24 RX ORDER — BUPIVACAINE HYDROCHLORIDE 7.5 MG/ML
INJECTION, SOLUTION INTRASPINAL PRN
Status: DISCONTINUED | OUTPATIENT
Start: 2023-07-24 | End: 2023-07-24 | Stop reason: SDUPTHER

## 2023-07-24 RX ORDER — SODIUM CHLORIDE 9 MG/ML
INJECTION, SOLUTION INTRAVENOUS PRN
Status: DISCONTINUED | OUTPATIENT
Start: 2023-07-24 | End: 2023-07-26 | Stop reason: HOSPADM

## 2023-07-24 RX ORDER — SODIUM CHLORIDE, SODIUM LACTATE, POTASSIUM CHLORIDE, CALCIUM CHLORIDE 600; 310; 30; 20 MG/100ML; MG/100ML; MG/100ML; MG/100ML
INJECTION, SOLUTION INTRAVENOUS CONTINUOUS
Status: DISCONTINUED | OUTPATIENT
Start: 2023-07-24 | End: 2023-07-26 | Stop reason: HOSPADM

## 2023-07-24 RX ORDER — PRENATAL VIT/IRON FUM/FOLIC AC 27MG-0.8MG
1 TABLET ORAL DAILY
Status: DISCONTINUED | OUTPATIENT
Start: 2023-07-24 | End: 2023-07-26 | Stop reason: HOSPADM

## 2023-07-24 RX ORDER — ACETAMINOPHEN 500 MG
1000 TABLET ORAL EVERY 8 HOURS PRN
Status: DISCONTINUED | OUTPATIENT
Start: 2023-07-24 | End: 2023-07-26 | Stop reason: HOSPADM

## 2023-07-24 RX ORDER — OXYCODONE HYDROCHLORIDE 5 MG/1
10 TABLET ORAL EVERY 4 HOURS PRN
Status: DISCONTINUED | OUTPATIENT
Start: 2023-07-24 | End: 2023-07-26 | Stop reason: HOSPADM

## 2023-07-24 RX ORDER — HYDROMORPHONE HYDROCHLORIDE 1 MG/ML
0.5 INJECTION, SOLUTION INTRAMUSCULAR; INTRAVENOUS; SUBCUTANEOUS
Status: DISCONTINUED | OUTPATIENT
Start: 2023-07-24 | End: 2023-07-26 | Stop reason: HOSPADM

## 2023-07-24 RX ORDER — MISOPROSTOL 200 UG/1
800 TABLET ORAL PRN
Status: DISCONTINUED | OUTPATIENT
Start: 2023-07-24 | End: 2023-07-26 | Stop reason: HOSPADM

## 2023-07-24 RX ORDER — SODIUM CHLORIDE 0.9 % (FLUSH) 0.9 %
5-40 SYRINGE (ML) INJECTION EVERY 12 HOURS SCHEDULED
Status: DISCONTINUED | OUTPATIENT
Start: 2023-07-24 | End: 2023-07-26 | Stop reason: HOSPADM

## 2023-07-24 RX ORDER — OXYCODONE HYDROCHLORIDE 5 MG/1
5 TABLET ORAL EVERY 4 HOURS PRN
Status: DISCONTINUED | OUTPATIENT
Start: 2023-07-24 | End: 2023-07-26 | Stop reason: HOSPADM

## 2023-07-24 RX ORDER — CEFAZOLIN SODIUM 1 G/3ML
INJECTION, POWDER, FOR SOLUTION INTRAMUSCULAR; INTRAVENOUS PRN
Status: DISCONTINUED | OUTPATIENT
Start: 2023-07-24 | End: 2023-07-24 | Stop reason: SDUPTHER

## 2023-07-24 RX ORDER — KETOROLAC TROMETHAMINE 30 MG/ML
INJECTION, SOLUTION INTRAMUSCULAR; INTRAVENOUS PRN
Status: DISCONTINUED | OUTPATIENT
Start: 2023-07-24 | End: 2023-07-24 | Stop reason: SDUPTHER

## 2023-07-24 RX ORDER — ONDANSETRON 2 MG/ML
INJECTION INTRAMUSCULAR; INTRAVENOUS PRN
Status: DISCONTINUED | OUTPATIENT
Start: 2023-07-24 | End: 2023-07-24 | Stop reason: SDUPTHER

## 2023-07-24 RX ORDER — FAMOTIDINE 10 MG/ML
INJECTION, SOLUTION INTRAVENOUS PRN
Status: DISCONTINUED | OUTPATIENT
Start: 2023-07-24 | End: 2023-07-24 | Stop reason: SDUPTHER

## 2023-07-24 RX ORDER — SODIUM CHLORIDE 0.9 % (FLUSH) 0.9 %
5-40 SYRINGE (ML) INJECTION PRN
Status: DISCONTINUED | OUTPATIENT
Start: 2023-07-24 | End: 2023-07-26 | Stop reason: HOSPADM

## 2023-07-24 RX ORDER — LIDOCAINE HYDROCHLORIDE 10 MG/ML
INJECTION, SOLUTION INFILTRATION; PERINEURAL PRN
Status: DISCONTINUED | OUTPATIENT
Start: 2023-07-24 | End: 2023-07-24 | Stop reason: SDUPTHER

## 2023-07-24 RX ORDER — DOCUSATE SODIUM 100 MG/1
100 CAPSULE, LIQUID FILLED ORAL 2 TIMES DAILY
Status: DISCONTINUED | OUTPATIENT
Start: 2023-07-24 | End: 2023-07-26 | Stop reason: HOSPADM

## 2023-07-24 RX ORDER — CARBOPROST TROMETHAMINE 250 UG/ML
250 INJECTION, SOLUTION INTRAMUSCULAR PRN
Status: DISCONTINUED | OUTPATIENT
Start: 2023-07-24 | End: 2023-07-26 | Stop reason: HOSPADM

## 2023-07-24 RX ORDER — KETOROLAC TROMETHAMINE 30 MG/ML
30 INJECTION, SOLUTION INTRAMUSCULAR; INTRAVENOUS EVERY 6 HOURS PRN
Status: DISPENSED | OUTPATIENT
Start: 2023-07-24 | End: 2023-07-25

## 2023-07-24 RX ORDER — MISOPROSTOL 200 UG/1
200 TABLET ORAL PRN
Status: DISCONTINUED | OUTPATIENT
Start: 2023-07-24 | End: 2023-07-26 | Stop reason: HOSPADM

## 2023-07-24 RX ORDER — EPHEDRINE SULFATE 50 MG/ML
INJECTION, SOLUTION INTRAVENOUS PRN
Status: DISCONTINUED | OUTPATIENT
Start: 2023-07-24 | End: 2023-07-24 | Stop reason: SDUPTHER

## 2023-07-24 RX ORDER — METHYLERGONOVINE MALEATE 0.2 MG/ML
200 INJECTION INTRAVENOUS PRN
Status: DISCONTINUED | OUTPATIENT
Start: 2023-07-24 | End: 2023-07-26 | Stop reason: HOSPADM

## 2023-07-24 RX ORDER — METOCLOPRAMIDE HYDROCHLORIDE 5 MG/ML
INJECTION INTRAMUSCULAR; INTRAVENOUS PRN
Status: DISCONTINUED | OUTPATIENT
Start: 2023-07-24 | End: 2023-07-24 | Stop reason: SDUPTHER

## 2023-07-24 RX ADMIN — Medication 87.3 MILLI-UNITS/MIN: at 10:14

## 2023-07-24 RX ADMIN — SODIUM CHLORIDE, PRESERVATIVE FREE 20 MG: 5 INJECTION INTRAVENOUS at 10:09

## 2023-07-24 RX ADMIN — BUPIVACAINE HYDROCHLORIDE IN DEXTROSE 1.7 ML: 7.5 INJECTION, SOLUTION SUBARACHNOID at 08:22

## 2023-07-24 RX ADMIN — OXYCODONE HYDROCHLORIDE 10 MG: 5 TABLET ORAL at 10:07

## 2023-07-24 RX ADMIN — LIDOCAINE HYDROCHLORIDE 30 MG: 10 INJECTION, SOLUTION INFILTRATION; PERINEURAL at 08:20

## 2023-07-24 RX ADMIN — PHENYLEPHRINE HYDROCHLORIDE 200 MCG: 10 INJECTION INTRAVENOUS at 08:31

## 2023-07-24 RX ADMIN — EPHEDRINE SULFATE 10 MG: 50 INJECTION INTRAMUSCULAR; INTRAVENOUS; SUBCUTANEOUS at 08:29

## 2023-07-24 RX ADMIN — KETOROLAC TROMETHAMINE 30 MG: 30 INJECTION, SOLUTION INTRAMUSCULAR; INTRAVENOUS at 16:11

## 2023-07-24 RX ADMIN — ONDANSETRON 4 MG: 2 INJECTION INTRAMUSCULAR; INTRAVENOUS at 08:18

## 2023-07-24 RX ADMIN — FAMOTIDINE 20 MG: 10 INJECTION, SOLUTION INTRAVENOUS at 08:18

## 2023-07-24 RX ADMIN — PHENYLEPHRINE HYDROCHLORIDE 100 MCG: 10 INJECTION INTRAVENOUS at 08:37

## 2023-07-24 RX ADMIN — SODIUM CHLORIDE, SODIUM LACTATE, POTASSIUM CHLORIDE, AND CALCIUM CHLORIDE: 600; 310; 30; 20 INJECTION, SOLUTION INTRAVENOUS at 09:10

## 2023-07-24 RX ADMIN — KETOROLAC TROMETHAMINE 30 MG: 30 INJECTION, SOLUTION INTRAMUSCULAR; INTRAVENOUS at 22:04

## 2023-07-24 RX ADMIN — KETOROLAC TROMETHAMINE 30 MG: 30 INJECTION, SOLUTION INTRAMUSCULAR; INTRAVENOUS at 09:46

## 2023-07-24 RX ADMIN — OXYCODONE HYDROCHLORIDE 10 MG: 5 TABLET ORAL at 22:05

## 2023-07-24 RX ADMIN — Medication 909 ML/HR: at 08:51

## 2023-07-24 RX ADMIN — PHENYLEPHRINE HYDROCHLORIDE 100 MCG: 10 INJECTION INTRAVENOUS at 08:41

## 2023-07-24 RX ADMIN — KETOROLAC TROMETHAMINE 30 MG: 30 INJECTION, SOLUTION INTRAMUSCULAR; INTRAVENOUS at 09:22

## 2023-07-24 RX ADMIN — EPHEDRINE SULFATE 10 MG: 50 INJECTION INTRAMUSCULAR; INTRAVENOUS; SUBCUTANEOUS at 08:35

## 2023-07-24 RX ADMIN — ACETAMINOPHEN 1000 MG: 500 TABLET ORAL at 10:08

## 2023-07-24 RX ADMIN — HYDROMORPHONE HYDROCHLORIDE 0.5 MG: 1 INJECTION, SOLUTION INTRAMUSCULAR; INTRAVENOUS; SUBCUTANEOUS at 10:32

## 2023-07-24 RX ADMIN — DOCUSATE SODIUM 100 MG: 100 CAPSULE, LIQUID FILLED ORAL at 22:05

## 2023-07-24 RX ADMIN — EPHEDRINE SULFATE 10 MG: 50 INJECTION INTRAMUSCULAR; INTRAVENOUS; SUBCUTANEOUS at 08:24

## 2023-07-24 RX ADMIN — SODIUM CHLORIDE, SODIUM LACTATE, POTASSIUM CHLORIDE, AND CALCIUM CHLORIDE: 600; 310; 30; 20 INJECTION, SOLUTION INTRAVENOUS at 06:18

## 2023-07-24 RX ADMIN — SODIUM CHLORIDE, SODIUM LACTATE, POTASSIUM CHLORIDE, AND CALCIUM CHLORIDE: 600; 310; 30; 20 INJECTION, SOLUTION INTRAVENOUS at 10:15

## 2023-07-24 RX ADMIN — OXYCODONE HYDROCHLORIDE 10 MG: 5 TABLET ORAL at 16:48

## 2023-07-24 RX ADMIN — PHENYLEPHRINE HYDROCHLORIDE 200 MCG: 10 INJECTION INTRAVENOUS at 08:29

## 2023-07-24 RX ADMIN — Medication 87.3 MILLI-UNITS/MIN: at 10:00

## 2023-07-24 RX ADMIN — METOCLOPRAMIDE 10 MG: 5 INJECTION, SOLUTION INTRAMUSCULAR; INTRAVENOUS at 08:18

## 2023-07-24 RX ADMIN — CEFAZOLIN SODIUM 2 G: 1 INJECTION, POWDER, FOR SOLUTION INTRAMUSCULAR; INTRAVENOUS at 08:30

## 2023-07-24 ASSESSMENT — PAIN DESCRIPTION - LOCATION
LOCATION: INCISION;ABDOMEN
LOCATION: INCISION
LOCATION: INCISION;ABDOMEN
LOCATION: ABDOMEN
LOCATION: ABDOMEN
LOCATION: INCISION

## 2023-07-24 ASSESSMENT — PAIN SCALES - GENERAL
PAINLEVEL_OUTOF10: 8
PAINLEVEL_OUTOF10: 6
PAINLEVEL_OUTOF10: 7
PAINLEVEL_OUTOF10: 6
PAINLEVEL_OUTOF10: 9
PAINLEVEL_OUTOF10: 7

## 2023-07-24 ASSESSMENT — PAIN DESCRIPTION - DESCRIPTORS
DESCRIPTORS: CRAMPING
DESCRIPTORS: CRAMPING;SORE;SHARP
DESCRIPTORS: SORE;SHARP;CRAMPING

## 2023-07-24 ASSESSMENT — PAIN DESCRIPTION - ORIENTATION
ORIENTATION: LOWER

## 2023-07-24 NOTE — ANESTHESIA POSTPROCEDURE EVALUATION
Department of Anesthesiology  Postprocedure Note    Patient: Lori Vera  MRN: [id number]  YOB: 1990  Date of evaluation: 2023      Procedure Summary     Date: 23 Room / Location: [facility]    Anesthesia Start: [address] Anesthesia Stop: 8616    Procedure:  SECTION (Abdomen) Diagnosis:     Surgeons: Liliana Everett MD Responsible Provider: JESSY Tai CRNA    Anesthesia Type: spinal ASA Status: 2          Anesthesia Type: No value filed.     Rico Phase I:      Rico Phase II:        Anesthesia Post Evaluation    Patient location during evaluation: PACU  Patient participation: complete - patient participated  Level of consciousness: awake and alert  Pain score: 0  Airway patency: patent  Nausea & Vomiting: no nausea and no vomiting  Complications: no  Cardiovascular status: hemodynamically stable  Respiratory status: acceptable  Hydration status: stable  Comments: BP- 131/65  HR-97  SAT-100%

## 2023-07-24 NOTE — OP NOTE
Department of Obstetrics and Gynecology   Section Note    Indications: Uncontrolled HTN at term with BPP 6/8 on  testing    Pre-operative Diagnosis:   1. IUP at 37 1/7 wks  2. Hypertension affecting pregnancy  3. BPP 6/8  4. Prior CS x 1     Post-operative Diagnosis: Same    Surgeon: Caryn Goodman MD     Assistants: Jacinda Musa    Anesthesia: Spinal anesthesia      Procedure Details   The patient was seen in the Holding Room. The risks, benefits, complications, treatment options, and expected outcomes were discussed with the patient. The patient concurred with the proposed plan, giving informed consent. The site of surgery properly noted/marked. The patient was taken to the Operating Room  identified as Neri Wong and the procedure verified as  Delivery. A Time Out was held and the above information confirmed. After induction of anesthesia, the patient was draped and prepped in the usual sterile manner. A Pfannenstiel incision was made and carried down through the subcutaneous tissue to the fascia. Fascial incision was made and extended transversely. The fascia was  from the underlying rectus tissue superiorly and inferiorly. The peritoneum was identified and entered. Peritoneal incision was extended longitudinally. A low transverse uterine incision was made. Delivered from [facility] presentation was a 2870 gram girl with Apgar scores of 8 at one minute and 9 at five minutes. After the umbilical cord was clamped and cut cord blood was obtained for evaluation. The placenta was removed intact and appeared normal. The uterus was exteriorized and incision was closed with running locked sutures of 0 Vicryl. A second imbricating layer of the same suture was placed with excellent Hemostasis obtained. Multiple nodules were removed from the surface of the left ovary and sent for evaluation. Posterior and paracolic gutters were cleared of all clots and debris.   The

## 2023-07-24 NOTE — FLOWSHEET NOTE
This RN at the bedside at this time. Pt dangled her legs on the side of the bed. Pt denies weakness and denies lightheadedness. Pt ambulated to the chair with a strong steady gait. Pt denies any needs at this time.

## 2023-07-24 NOTE — ANESTHESIA PROCEDURE NOTES
Spinal Block    Patient location during procedure: OB  End time: 7/24/2023 8:20 AM  Reason for block: primary anesthetic  Staffing  Performed: resident/CRNA   Resident/CRNA: JESSY Lind - CRNA  Spinal Block  Patient position: sitting  Prep: Betadine  Patient monitoring: continuous pulse ox and frequent blood pressure checks  Approach: midline  Location: L2/L3  Guidance: paresthesia technique  Provider prep: mask and sterile gloves  Local infiltration: lidocaine  Needle  Needle type: Pencan   Needle gauge: 24 G  Needle length: 4 in  Assessment  Sensory level: T10  Swirl obtained: Yes  CSF: clear  Attempts: 1  Hemodynamics: stable  Preanesthetic Checklist  Completed: patient identified, IV checked, site marked, risks and benefits discussed, surgical/procedural consents, equipment checked, pre-op evaluation, timeout performed, anesthesia consent given, oxygen available, monitors applied/VS acknowledged, fire risk safety assessment completed and verbalized and blood product R/B/A discussed and consented

## 2023-07-25 LAB
ERYTHROCYTE [DISTWIDTH] IN BLOOD BY AUTOMATED COUNT: 14.6 % (ref 11.5–14.5)
HCT VFR BLD AUTO: 28.3 % (ref 37–47)
HGB BLD-MCNC: 9.4 G/DL (ref 12–16)
MCH RBC QN AUTO: 28.5 PG (ref 27–31)
MCHC RBC AUTO-ENTMCNC: 33.2 G/DL (ref 33–37)
MCV RBC AUTO: 85.8 FL (ref 81–99)
PLATELET # BLD AUTO: 184 K/UL (ref 130–400)
PMV BLD AUTO: 10.6 FL (ref 9.4–12.3)
RBC # BLD AUTO: 3.3 M/UL (ref 4.2–5.4)
WBC # BLD AUTO: 11.8 K/UL (ref 4.8–10.8)

## 2023-07-25 PROCEDURE — 36415 COLL VENOUS BLD VENIPUNCTURE: CPT

## 2023-07-25 PROCEDURE — 1220000000 HC SEMI PRIVATE OB R&B

## 2023-07-25 PROCEDURE — 85027 COMPLETE CBC AUTOMATED: CPT

## 2023-07-25 PROCEDURE — 99024 POSTOP FOLLOW-UP VISIT: CPT | Performed by: NURSE PRACTITIONER

## 2023-07-25 PROCEDURE — 6360000002 HC RX W HCPCS: Performed by: OBSTETRICS & GYNECOLOGY

## 2023-07-25 PROCEDURE — 6370000000 HC RX 637 (ALT 250 FOR IP): Performed by: OBSTETRICS & GYNECOLOGY

## 2023-07-25 PROCEDURE — 6370000000 HC RX 637 (ALT 250 FOR IP): Performed by: NURSE PRACTITIONER

## 2023-07-25 RX ORDER — UREA 10 %
140 LOTION (ML) TOPICAL 2 TIMES DAILY WITH MEALS
Status: DISCONTINUED | OUTPATIENT
Start: 2023-07-25 | End: 2023-07-26 | Stop reason: HOSPADM

## 2023-07-25 RX ADMIN — OXYCODONE HYDROCHLORIDE 10 MG: 5 TABLET ORAL at 12:40

## 2023-07-25 RX ADMIN — OXYCODONE HYDROCHLORIDE 10 MG: 5 TABLET ORAL at 01:57

## 2023-07-25 RX ADMIN — PRENATAL VIT W/ FE FUMARATE-FA TAB 27-0.8 MG 1 TABLET: 27-0.8 TAB at 07:54

## 2023-07-25 RX ADMIN — KETOROLAC TROMETHAMINE 30 MG: 30 INJECTION, SOLUTION INTRAMUSCULAR; INTRAVENOUS at 05:55

## 2023-07-25 RX ADMIN — DOCUSATE SODIUM 100 MG: 100 CAPSULE, LIQUID FILLED ORAL at 07:54

## 2023-07-25 RX ADMIN — OXYCODONE HYDROCHLORIDE 10 MG: 5 TABLET ORAL at 21:13

## 2023-07-25 RX ADMIN — Medication 140 MG: at 12:41

## 2023-07-25 RX ADMIN — Medication 140 MG: at 16:48

## 2023-07-25 RX ADMIN — OXYCODONE HYDROCHLORIDE 10 MG: 5 TABLET ORAL at 16:47

## 2023-07-25 RX ADMIN — DOCUSATE SODIUM 100 MG: 100 CAPSULE, LIQUID FILLED ORAL at 21:14

## 2023-07-25 RX ADMIN — OXYCODONE HYDROCHLORIDE 10 MG: 5 TABLET ORAL at 05:56

## 2023-07-25 ASSESSMENT — PAIN DESCRIPTION - ORIENTATION
ORIENTATION: LOWER

## 2023-07-25 ASSESSMENT — PAIN SCALES - GENERAL
PAINLEVEL_OUTOF10: 7
PAINLEVEL_OUTOF10: 6
PAINLEVEL_OUTOF10: 5
PAINLEVEL_OUTOF10: 3
PAINLEVEL_OUTOF10: 5

## 2023-07-25 ASSESSMENT — PAIN DESCRIPTION - LOCATION
LOCATION: INCISION
LOCATION: ABDOMEN
LOCATION: ABDOMEN

## 2023-07-25 ASSESSMENT — PAIN - FUNCTIONAL ASSESSMENT
PAIN_FUNCTIONAL_ASSESSMENT: ACTIVITIES ARE NOT PREVENTED

## 2023-07-25 ASSESSMENT — PAIN DESCRIPTION - DESCRIPTORS
DESCRIPTORS: DISCOMFORT;ACHING
DESCRIPTORS: ACHING;BURNING;DISCOMFORT
DESCRIPTORS: CRAMPING;BURNING;THROBBING
DESCRIPTORS: CRAMPING;DULL

## 2023-07-25 NOTE — DISCHARGE INSTRUCTIONS
POSTPARTUM EDUCATION / DISCHARGE PLANNING    Call Doctor:  1. If temp 100.4 or greater. 2. If foul smelling discharge. 3. If discharge changes from pink or yellow, back to red, and bleeding is heavier than a normal period. 4. If you pass large clots. 5. If abdominal incision starts to separate and/or starts to bleeding, is red and warm to touch or has drainage with a foul odor. 6. Report any pain, redness, or warmth of skin in calf of leg which could indicate a blood clot. Crampin. Cramping is normal; uterus is returning to pre-pregnant state. 2. Moms of twins and mothers of more than one child and breast-feeding mothers will cramp more than first time moms. 3. For relief, place pillow under abdomen and lie on it to apply pressure. Walking may help. Discharge:   1. Discharge will be dark for first few days (similar to menstrual flow). It will turn to pinkish brown after 2-3 days and creamy yellow for an additional week or two. Moderate flow-use of 4-8 pads daily. 2. Small clots are normal.    Episiotomy Care:  1. May use sitz bath 3-4 times daily. 2. Use analgesic foams or sprays or medicine as ordered. 3. Keep perineal area clean. 4. Continue to use agusto bottle after urinating and gently pat from front to back. 5. Stitches will dissolve on their own. If you have stitches, shower only for 2-4 weeks or as directed by your doctor to allow suture line to heal properly. No baths, hot tubs or swimming pools until told it is alright to do so by your doctor. Abdominal Incision Care:  1. Leave open to air after original dressing is removed. 2. Clean incision with soapy water unless otherwise directed. Return of Periods:  1. You should resume menstruating anywhere from 6-8 weeks after birth; up to 24 weeks if breast-feeding. 2. Breast feeding mothers may also resume menstruating during this time. Breast:  1.  ENGORGEMENT, NON-NURSING MOMS:  Wear supportive, well-fitting bra for two weeks,

## 2023-07-25 NOTE — LACTATION NOTE
Encouraged mother to start pumping. Hospital grade electric pump provided. Instructions for set up and cleaning given. Instructed to breastfeed every 2-3 hours for 15-20 mins each side or on demand. Hand expression and breast compression encouraged to increase milk transfer while breastfeeding and pumping. Instructed to pump for 15 mins after breastfeeding, giving baby every drop of colostrum/EBM. Observed pumping sessions flanged fit appropriate. Mother understands and agrees with feeding plan.

## 2023-07-25 NOTE — ANESTHESIA POST-OP
Anesthesia Post-op Note    Helio Urrutia    Procedure(s):   SECTION    Patient location: Labor and Delivery    Anesthesia type: Spinal    Post-op pain: adequate analgesia    Post-op assessment: Post spinal assessment: Pt. Ambulating.  No urinary retention, no complaints or concerns     Post-op vital signs: stable    Level of consciousness: awake, alert, and oriented    Complications: None

## 2023-07-26 VITALS
HEART RATE: 90 BPM | OXYGEN SATURATION: 100 % | DIASTOLIC BLOOD PRESSURE: 73 MMHG | TEMPERATURE: 97.6 F | SYSTOLIC BLOOD PRESSURE: 118 MMHG | RESPIRATION RATE: 18 BRPM

## 2023-07-26 PROCEDURE — 6370000000 HC RX 637 (ALT 250 FOR IP): Performed by: OBSTETRICS & GYNECOLOGY

## 2023-07-26 RX ORDER — OXYCODONE HYDROCHLORIDE 5 MG/1
5 TABLET ORAL EVERY 6 HOURS PRN
Qty: 12 TABLET | Refills: 0 | Status: SHIPPED | OUTPATIENT
Start: 2023-07-26 | End: 2023-07-29

## 2023-07-26 RX ADMIN — OXYCODONE HYDROCHLORIDE 10 MG: 5 TABLET ORAL at 05:56

## 2023-07-26 RX ADMIN — PRENATAL VIT W/ FE FUMARATE-FA TAB 27-0.8 MG 1 TABLET: 27-0.8 TAB at 08:55

## 2023-07-26 RX ADMIN — OXYCODONE HYDROCHLORIDE 10 MG: 5 TABLET ORAL at 02:10

## 2023-07-26 RX ADMIN — DOCUSATE SODIUM 100 MG: 100 CAPSULE, LIQUID FILLED ORAL at 08:55

## 2023-07-26 ASSESSMENT — PAIN DESCRIPTION - DESCRIPTORS: DESCRIPTORS: CRAMPING;DISCOMFORT;BURNING

## 2023-07-26 ASSESSMENT — PAIN - FUNCTIONAL ASSESSMENT
PAIN_FUNCTIONAL_ASSESSMENT: ACTIVITIES ARE NOT PREVENTED
PAIN_FUNCTIONAL_ASSESSMENT: ACTIVITIES ARE NOT PREVENTED

## 2023-07-26 ASSESSMENT — PAIN DESCRIPTION - LOCATION
LOCATION: ABDOMEN;INCISION
LOCATION: ABDOMEN;INCISION

## 2023-07-26 ASSESSMENT — PAIN SCALES - GENERAL
PAINLEVEL_OUTOF10: 4
PAINLEVEL_OUTOF10: 7

## 2023-07-26 ASSESSMENT — PAIN DESCRIPTION - ORIENTATION
ORIENTATION: LOWER
ORIENTATION: LOWER

## 2023-08-08 ENCOUNTER — TELEPHONE (OUTPATIENT)
Dept: OBGYN CLINIC | Age: 33
End: 2023-08-08

## 2023-08-08 NOTE — PROGRESS NOTES
Pt is here today for a 2 wk pp visit following a  on 23    Pt mood is good     Breastfeeding - some but mostly formula feeding   Bleeding - some bleeding

## 2023-08-09 ASSESSMENT — ENCOUNTER SYMPTOMS
GASTROINTESTINAL NEGATIVE: 1
RESPIRATORY NEGATIVE: 1
EYES NEGATIVE: 1

## 2023-08-09 NOTE — PROGRESS NOTES
Cm Hicks is a 28 y.o.  who presents today for her 2 week postpartum visit following a c section on 23. Pt is breastfeeding and formula feeding. She states her mood is good, and is still having some bleeding. Review of Systems   Constitutional: Negative. HENT: Negative. Eyes: Negative. Respiratory: Negative. Cardiovascular: Negative. Gastrointestinal: Negative. Genitourinary: Negative. Negative for dysuria, frequency, menstrual problem, pelvic pain, urgency and vaginal discharge. Skin: Negative. Neurological: Negative. Psychiatric/Behavioral: Negative. Past Medical History:   Diagnosis Date    PCOS (polycystic ovarian syndrome)     Trigeminal neuralgia     Right       Past Surgical History:   Procedure Laterality Date     SECTION       SECTION N/A 2023     SECTION performed by Mynor Irving MD at [address] L&D OR    FACIAL NERVE SURGERY      radiosurgery for trigeminal neuralgia    TONSILLECTOMY AND ADENOIDECTOMY      TRIGEMINAL NERVE DECOMPRESSION      WISDOM TOOTH EXTRACTION         No family history on file.     Social History     Socioeconomic History    Marital status:      Spouse name: Not on file    Number of children: Not on file    Years of education: Not on file    Highest education level: Not on file   Occupational History    Not on file   Tobacco Use    Smoking status: Never    Smokeless tobacco: Never   Vaping Use    Vaping Use: Never used   Substance and Sexual Activity    Alcohol use: No     Alcohol/week: 0.0 standard drinks    Drug use: No    Sexual activity: Yes     Partners: Male   Other Topics Concern    Not on file   Social History Narrative    Not on file     Social Determinants of Health     Financial Resource Strain: Not on file   Food Insecurity: Not on file   Transportation Needs: Not on file   Physical Activity: Not on file   Stress: Not on file   Social Connections: Not on file   Intimate

## 2023-08-10 ENCOUNTER — POSTPARTUM VISIT (OUTPATIENT)
Dept: OBGYN CLINIC | Age: 33
End: 2023-08-10

## 2023-08-10 VITALS
DIASTOLIC BLOOD PRESSURE: 83 MMHG | WEIGHT: 258 LBS | SYSTOLIC BLOOD PRESSURE: 116 MMHG | BODY MASS INDEX: 42.99 KG/M2 | HEIGHT: 65 IN | HEART RATE: 83 BPM

## 2023-08-10 DIAGNOSIS — Z13.32 ENCOUNTER FOR SCREENING FOR MATERNAL DEPRESSION: ICD-10-CM

## 2023-08-10 PROCEDURE — 0503F POSTPARTUM CARE VISIT: CPT | Performed by: OBSTETRICS & GYNECOLOGY

## 2023-08-21 ENCOUNTER — POSTPARTUM VISIT (OUTPATIENT)
Dept: OBGYN CLINIC | Age: 33
End: 2023-08-21

## 2023-08-21 VITALS
HEART RATE: 97 BPM | SYSTOLIC BLOOD PRESSURE: 121 MMHG | DIASTOLIC BLOOD PRESSURE: 80 MMHG | BODY MASS INDEX: 43.32 KG/M2 | WEIGHT: 260 LBS | HEIGHT: 65 IN

## 2023-08-21 DIAGNOSIS — Z13.32 ENCOUNTER FOR SCREENING FOR MATERNAL DEPRESSION: ICD-10-CM

## 2023-08-21 DIAGNOSIS — F41.8 POSTPARTUM ANXIETY: ICD-10-CM

## 2023-08-21 DIAGNOSIS — Z98.891 S/P CESAREAN SECTION: ICD-10-CM

## 2023-08-21 PROCEDURE — S3005 EVAL SELF-ASSESS DEPRESSION: HCPCS | Performed by: NURSE PRACTITIONER

## 2023-08-21 PROCEDURE — 0503F POSTPARTUM CARE VISIT: CPT | Performed by: NURSE PRACTITIONER

## 2023-08-21 RX ORDER — FLUOXETINE HYDROCHLORIDE 20 MG/1
20 CAPSULE ORAL DAILY
Qty: 30 CAPSULE | Refills: 3 | Status: SHIPPED | OUTPATIENT
Start: 2023-08-21 | End: 2023-08-21

## 2023-08-21 RX ORDER — FLUOXETINE HYDROCHLORIDE 20 MG/1
20 CAPSULE ORAL DAILY
Qty: 90 CAPSULE | Refills: 3 | Status: SHIPPED | OUTPATIENT
Start: 2023-08-21

## 2023-08-21 ASSESSMENT — ENCOUNTER SYMPTOMS
EYES NEGATIVE: 1
RESPIRATORY NEGATIVE: 1
DIARRHEA: 0
GASTROINTESTINAL NEGATIVE: 1
ALLERGIC/IMMUNOLOGIC NEGATIVE: 1
CONSTIPATION: 0

## 2023-08-21 NOTE — PATIENT INSTRUCTIONS
Patient Education         Section: What to Expect at Home  Your Recovery     A  section, or , is surgery to deliver your baby through a cut that the doctor makes in your lower belly and uterus. The cut is called an incision. You may have some pain in your lower belly and need pain medicine for 1 to 2 weeks. You can expect some vaginal bleeding for several weeks. You will probably need about 6 weeks to fully recover. It's important to take it easy while the incision heals. Avoid heavy lifting, strenuous activities, and exercises that strain the belly muscles while you recover. Ask a family member or friend for help with housework, cooking, and shopping. This care sheet gives you a general idea about how long it will take for you to recover. But each person recovers at a different pace. Follow the steps below to get better as quickly as possible. How can you care for yourself at home? Activity    Rest when you feel tired. Getting enough sleep will help you recover. Try to walk each day. Start by walking a little more than you did the day before. Bit by bit, increase the amount you walk. Walking boosts blood flow and helps prevent pneumonia, constipation, and blood clots. Avoid strenuous activities, such as bicycle riding, jogging, weightlifting, and aerobic exercise, for 6 weeks or until your doctor says it is okay. Until your doctor says it is okay, do not lift anything heavier than your baby. Do not do sit-ups or other exercises that strain the belly muscles for 6 weeks or until your doctor says it is okay. Hold a pillow over your incision when you cough or take deep breaths. This will support your belly and decrease your pain. You may shower as usual. Pat the incision dry when you are done. You will have some vaginal bleeding. Wear sanitary pads. Do not douche or use tampons until your doctor says it is okay. Ask your doctor when you can drive again.

## 2023-08-21 NOTE — PROGRESS NOTES
The patient returns for her post-partum visit. Pt is wanting to discuss pp anxiety she did have a break down this am. She did not have with her 11 yr old has had pp depression.     Visit Reason:  Post-Partum Visit       Baby's Name:  Adrian Lund        Delivery Date:  2023       Type of Delivery:         Feeding: Bottle        LMP:         Contraceptive Choices:        Last PAP:         Depression: Scored 13 on depression screening

## 2023-08-21 NOTE — PROGRESS NOTES
Yolanda Pack is a 28 y.o. who presents today for   Review of Systems    Past Medical History:   Diagnosis Date    PCOS (polycystic ovarian syndrome)     Trigeminal neuralgia     Right       Past Surgical History:   Procedure Laterality Date     SECTION  2018     SECTION N/A 2023     SECTION performed by Sindi Alfonso MD at [facility]D OR    FACIAL NERVE SURGERY      radiosurgery for trigeminal neuralgia    TONSILLECTOMY AND ADENOIDECTOMY      TRIGEMINAL NERVE DECOMPRESSION      WISDOM TOOTH EXTRACTION         No family history on file. Social History     Socioeconomic History    Marital status:      Spouse name: Not on file    Number of children: Not on file    Years of education: Not on file    Highest education level: Not on file   Occupational History    Not on file   Tobacco Use    Smoking status: Never    Smokeless tobacco: Never   Vaping Use    Vaping Use: Never used   Substance and Sexual Activity    Alcohol use: No     Alcohol/week: 0.0 standard drinks    Drug use: No    Sexual activity: Yes     Partners: Male   Other Topics Concern    Not on file   Social History Narrative    Not on file     Social Determinants of Health     Financial Resource Strain: Not on file   Food Insecurity: Not on file   Transportation Needs: Not on file   Physical Activity: Not on file   Stress: Not on file   Social Connections: Not on file   Intimate Partner Violence: Not on file   Housing Stability: Not on file       No current outpatient medications on file. Allergies   Allergen Reactions    Ibuprofen Other (See Comments)     MAKES THROAT BURN       LMP 2022 (Approximate)   Physical Exam          Assessment  {No diagnosis found. (Refresh or delete this SmartLink)}    Plan     ***  I Joshua Estevez, am scribing for and in the presence of Dr. Roddy De La Torre.      I, Dr. Roddy De La Torre, personally performed the services described in this documentation as scribed by Joshua Estevez

## 2023-09-27 ASSESSMENT — ENCOUNTER SYMPTOMS
GASTROINTESTINAL NEGATIVE: 1
RESPIRATORY NEGATIVE: 1
EYES NEGATIVE: 1

## 2023-09-27 NOTE — PROGRESS NOTES
Ezra Dunn is a 28 y.o.  who presents today for an 8 week postpartum visit following a c section on 23 and a medication follow up. She was started on Prozac at her last visit for postpartum anxiety. Review of Systems   Constitutional: Negative. HENT: Negative. Eyes: Negative. Respiratory: Negative. Cardiovascular: Negative. Gastrointestinal: Negative. Genitourinary: Negative. Negative for dysuria, frequency, menstrual problem, pelvic pain, urgency and vaginal discharge. Skin: Negative. Neurological: Negative. Psychiatric/Behavioral: Negative. Past Medical History:   Diagnosis Date    PCOS (polycystic ovarian syndrome)     Trigeminal neuralgia     Right       Past Surgical History:   Procedure Laterality Date     SECTION       SECTION N/A 2023     SECTION performed by Isai Shelton MD at [address] L&D OR    FACIAL NERVE SURGERY      radiosurgery for trigeminal neuralgia    TONSILLECTOMY AND ADENOIDECTOMY      TRIGEMINAL NERVE DECOMPRESSION      WISDOM TOOTH EXTRACTION         No family history on file.     Social History     Socioeconomic History    Marital status:      Spouse name: Not on file    Number of children: Not on file    Years of education: Not on file    Highest education level: Not on file   Occupational History    Not on file   Tobacco Use    Smoking status: Never    Smokeless tobacco: Never   Vaping Use    Vaping Use: Never used   Substance and Sexual Activity    Alcohol use: No     Alcohol/week: 0.0 standard drinks of alcohol    Drug use: No    Sexual activity: Yes     Partners: Male   Other Topics Concern    Not on file   Social History Narrative    Not on file     Social Determinants of Health     Financial Resource Strain: Not on file   Food Insecurity: Not on file   Transportation Needs: Not on file   Physical Activity: Not on file   Stress: Not on file   Social Connections: Not on file   Intimate

## 2023-09-28 ENCOUNTER — POSTPARTUM VISIT (OUTPATIENT)
Dept: OBGYN CLINIC | Age: 33
End: 2023-09-28

## 2023-09-28 ENCOUNTER — TELEPHONE (OUTPATIENT)
Dept: OBGYN CLINIC | Age: 33
End: 2023-09-28

## 2023-09-28 DIAGNOSIS — F41.8 POSTPARTUM ANXIETY: Primary | ICD-10-CM

## 2023-09-28 DIAGNOSIS — Z79.899 FOLLOW-UP ENCOUNTER INVOLVING MEDICATION: ICD-10-CM

## 2023-09-28 PROCEDURE — 0503F POSTPARTUM CARE VISIT: CPT | Performed by: OBSTETRICS & GYNECOLOGY

## 2023-09-28 SDOH — ECONOMIC STABILITY: FOOD INSECURITY: WITHIN THE PAST 12 MONTHS, YOU WORRIED THAT YOUR FOOD WOULD RUN OUT BEFORE YOU GOT MONEY TO BUY MORE.: NEVER TRUE

## 2023-09-28 SDOH — ECONOMIC STABILITY: INCOME INSECURITY: HOW HARD IS IT FOR YOU TO PAY FOR THE VERY BASICS LIKE FOOD, HOUSING, MEDICAL CARE, AND HEATING?: NOT HARD AT ALL

## 2023-09-28 SDOH — ECONOMIC STABILITY: HOUSING INSECURITY
IN THE LAST 12 MONTHS, WAS THERE A TIME WHEN YOU DID NOT HAVE A STEADY PLACE TO SLEEP OR SLEPT IN A SHELTER (INCLUDING NOW)?: NO

## 2023-09-28 SDOH — ECONOMIC STABILITY: FOOD INSECURITY: WITHIN THE PAST 12 MONTHS, THE FOOD YOU BOUGHT JUST DIDN'T LAST AND YOU DIDN'T HAVE MONEY TO GET MORE.: NEVER TRUE

## 2023-09-28 SDOH — ECONOMIC STABILITY: TRANSPORTATION INSECURITY
IN THE PAST 12 MONTHS, HAS LACK OF TRANSPORTATION KEPT YOU FROM MEETINGS, WORK, OR FROM GETTING THINGS NEEDED FOR DAILY LIVING?: NO

## 2023-09-28 NOTE — TELEPHONE ENCOUNTER
Patient has a 1:00 PP scheduled for today in office. Patient sent a [facility] message on 9/12 for it to be a [facility] appt. Please change appt. Thank you!

## 2023-09-28 NOTE — TELEPHONE ENCOUNTER
Called pt for video visit, pt is unable to do at this time, I rescheduled her appt. She states her anxiety level is better since she started her Pristiq.

## 2023-10-24 NOTE — PROGRESS NOTES
Goal Outcome Evaluation:  Plan of Care Reviewed With: patient        Progress: improving  Outcome Evaluation: echo and stress normal. pt to dc now.          Pt is here today for a 8 week postpartum visit following a c section on 7-24-23     Pt mood is     Breastfeeding -   Bleeding -

## 2024-01-25 NOTE — TELEPHONE ENCOUNTER
Called patient to see if patient would like to reschedule no show appt with Dr. Jonathan Gunter, left a voicemail with a call back number if they would like to reschedule.
No

## (undated) DEVICE — GLOVE ORANGE PI 7 1/2   MSG9075

## (undated) DEVICE — SUTURE STRATAFIX SYMMETRIC PDS + SZ 1 L18IN ABSRB VLT OS-6 SXPP1A201

## (undated) DEVICE — Device

## (undated) DEVICE — SUTURE VCRL + 0 L27IN ABSRB VLT CTX L48MM 1/2 CIR VCP364H

## (undated) DEVICE — 3M™ STERI-STRIP™ REINFORCED ADHESIVE SKIN CLOSURES, R1547, 1/2 IN X 4 IN (12 MM X 100 MM), 6 STRIPS/ENVELOPE: Brand: 3M™ STERI-STRIP™

## (undated) DEVICE — LARGE, DISPOSABLE ALEXIS O C-SECTION PROTECTOR - RETRACTOR: Brand: ALEXIS ® O C-SECTION PROTECTOR - RETRACTOR

## (undated) DEVICE — SLV SCD KN ADJ EXPRSS LG

## (undated) DEVICE — 3M™ MEDIPORE™ H SOFT CLOTH SURGICAL TAPE 2868, 8 INCH X 10 YARD (20,3CM X 9,1M), 6 ROLLS/CASE: Brand: 3M™ MEDIPORE™

## (undated) DEVICE — STPLR SKIN SUBCUTICULAR INSORB 2030

## (undated) DEVICE — SUT MNCRYL 2/0 CT1 36IN UD MCP945H

## (undated) DEVICE — GARMENT COMPR STD FOR 17IN CALF UNIF THER FLOTRN

## (undated) DEVICE — DRESSING FOAM W10XL20CM ANTIMIC SELF ADH SAFETAC TECHNOLOGY

## (undated) DEVICE — SUT MNCRYL 0/0 CTX 36IN Y398H

## (undated) DEVICE — GLV SURG SENSICARE SLT PF LF 7.5 STRL

## (undated) DEVICE — BARRIER ADH W3XL4IN UTER PELV ABSRB GYNECARE INTCEED

## (undated) DEVICE — SOLUTION IV IRRIG POUR BRL 0.9% SODIUM CHL 2F7124

## (undated) DEVICE — AIR SHEET,LAT,COMFORT GLIDE, BLEND 40X80: Brand: MEDLINE

## (undated) DEVICE — SUTURE VCRL SZ 0 L36IN ABSRB VLT L36MM CT-1 1/2 CIR J346H

## (undated) DEVICE — GOWN,PREVENTION PLUS,XXLARGE,STERILE: Brand: MEDLINE

## (undated) DEVICE — TRAY EPI 25GA 3.5IN 0.75% BIPIVCAIN 8.25% D CONTAIN BPA

## (undated) DEVICE — MASK ROUND 60MM FPH (10) S/USE: Brand: FISHER & PAYKEL HEALTHCARE

## (undated) DEVICE — TRAY,FOLEY,100% SIL,16FR 10ML: Brand: MEDLINE

## (undated) DEVICE — STERILE POLYISOPRENE POWDER-FREE SURGICAL GLOVES: Brand: PROTEXIS

## (undated) DEVICE — ADHS LIQ MASTISOL 2/3ML

## (undated) DEVICE — SYSTEM SKIN CLSR 22CM DERMBND PRINEO

## (undated) DEVICE — APPL CHLORAPREP W/TINT 26ML ORNG

## (undated) DEVICE — BINDER ABD H12IN COT FOR 45-62IN WAIST UNIV PREM 4 PNL DSGN

## (undated) DEVICE — SUTURE MCRYL SZ 3-0 L27IN ABSRB UD L60MM KS STR REV CUT Y523H

## (undated) DEVICE — SUT VIC 0 CT1 36IN J946H

## (undated) DEVICE — SPONGE LAP W18XL18IN WHT COT 4 PLY FLD STRUNG RADPQ DISP ST 2 PER PACK

## (undated) DEVICE — DRSNG PAD ABD 8X10IN STRL

## (undated) DEVICE — ANTIBACTERIAL VIOLET BRAIDED (POLYGLACTIN 910), SYNTHETIC ABSORBABLE SUTURE: Brand: COATED VICRYL

## (undated) DEVICE — SUTURE VCRL + SZ 2-0 L36IN ABSRB UD L36MM CT-1 1/2 CIR VCP945H